# Patient Record
Sex: FEMALE | Race: BLACK OR AFRICAN AMERICAN | Employment: OTHER | URBAN - METROPOLITAN AREA
[De-identification: names, ages, dates, MRNs, and addresses within clinical notes are randomized per-mention and may not be internally consistent; named-entity substitution may affect disease eponyms.]

---

## 2017-01-12 ENCOUNTER — OFFICE VISIT (OUTPATIENT)
Dept: FAMILY MEDICINE CLINIC | Age: 62
End: 2017-01-12

## 2017-01-12 VITALS
RESPIRATION RATE: 20 BRPM | DIASTOLIC BLOOD PRESSURE: 87 MMHG | BODY MASS INDEX: 34.84 KG/M2 | TEMPERATURE: 97.9 F | OXYGEN SATURATION: 97 % | HEART RATE: 80 BPM | SYSTOLIC BLOOD PRESSURE: 122 MMHG | WEIGHT: 172.8 LBS | HEIGHT: 59 IN

## 2017-01-12 DIAGNOSIS — Z11.1 TUBERCULOSIS SCREENING: ICD-10-CM

## 2017-01-12 DIAGNOSIS — R05.9 COUGH: Primary | ICD-10-CM

## 2017-01-12 DIAGNOSIS — Z92.89 HISTORY OF POSITIVE PPD: ICD-10-CM

## 2017-01-12 RX ORDER — AZITHROMYCIN 250 MG/1
TABLET, FILM COATED ORAL
Qty: 6 TAB | Refills: 0 | Status: SHIPPED | OUTPATIENT
Start: 2017-01-12 | End: 2017-02-17 | Stop reason: ALTCHOICE

## 2017-01-12 RX ORDER — PROMETHAZINE HYDROCHLORIDE AND DEXTROMETHORPHAN HYDROBROMIDE 6.25; 15 MG/5ML; MG/5ML
5 SYRUP ORAL
Qty: 100 ML | Refills: 0 | Status: SHIPPED | OUTPATIENT
Start: 2017-01-12 | End: 2017-01-19

## 2017-01-12 NOTE — PROGRESS NOTES
Chief Complaint   Patient presents with    Cough     x 6 days           HPI:     A 56 y/o female patient presents with complaints of persistent cough x 6 days. States cough got progressively worse within the last few days which prompted visit today. States she tried OTC cough preparations with no relief. Denies nasal congestion, frontal headache, sore throat, fever, chills, generalized body aches. 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 Denies SOB, CP, dizziness. She is up to date with influenza vaccine. Also requests for CXR for tuberculosis screening for job purposes. States she always had positive PPD in the past but no history of TB. Denies low grade temp, night sweats, weakness, loss of appetite, weight loss. Past Medical History   Diagnosis Date    Arthritis     Diverticulitis      Social History   Substance Use Topics    Smoking status: Never Smoker    Smokeless tobacco: Never Used    Alcohol use No       Outpatient Encounter Prescriptions as of 1/12/2017     No facility-administered encounter medications on file as of 1/12/2017. ROS:    Negative except that mentioned in HPI.     Physical Exam:    Vital Signs:   Visit Vitals    /87 (BP 1 Location: Left arm, BP Patient Position: Sitting)    Pulse 80    Temp 97.9 °F (36.6 °C) (Oral)    Resp 20    Ht 4' 11\" (1.499 m)    Wt 172 lb 12.8 oz (78.4 kg)    SpO2 97%  Comment: room air    BMI 34.9 kg/m2         Constitutional: a, a & o x 3, mildly ill-appearing, no acute distress, interacting appropriately  HEENT: Head normocephalic, atraumatic, no conjuctival pallor or erythema, PERRLA, EOMI, nose clear, no sinus tenderness, pharynx and tonsils with mild erythema, no exudates, no tonsillar swelling   Respiratory: symmetrical chest expansion,diminished lung sound on JULIO, normal respiratory effort, no wheezes or crackles  CV: capillary refill < 2 sec, normal S1S2, regular rate and rhythm, no murmurs, no carotid bruits, no JVD, pulses palpable  Skin: no pallor, warm and dry, no rashes  Lymph: no cervical lymphadenopathy      Assessment/Plan:    1. Cough    - avoid triggers if possible  - push fluids  - azithromycin (ZITHROMAX) 250 mg tablet; Take 2 tablets today, then take 1 tablet daily  Dispense: 6 Tab; Refill: 0  - promethazine-dextromethorphan (PROMETHAZINE-DM) 6.25-15 mg/5 mL syrup; Take 5 mL by mouth four (4) times daily as needed for Cough for up to 7 days. Indications: COUGH  Dispense: 100 mL; Refill: 0    2. History of positive PPD    - XR CHEST PA LAT; Future    3. Tuberculosis screening    - XR CHEST PA LAT; Future    Additional Notes: Discussed today's diagnosis and treatment plans. Medication indications and adverse effects discussed. After Visit Summary: Provided and discussed printed patient instructions. Questions answered. Follow-up Disposition:  Return if symptoms worsen or fail to improve. Ankit Shoemaker, ANP-BC  Adult Medicine  [facility]

## 2017-01-12 NOTE — LETTER
NOTIFICATION RETURN TO WORK / SCHOOL 
 
1/12/2017 10:19 AM 
 
Ms. Ajay Ron [place] [address] To Whom It May Concern: 
 
Ajay Ron is currently under the care of Lane Anderson. She will return to work/school on:1/15/17 If there are questions or concerns please have the patient contact our office. Sincerely, NANCY Boland-C

## 2017-01-12 NOTE — PROGRESS NOTES
Pt here today for cough x 6 days    1. Have you been to the ER, urgent care clinic since your last visit? Hospitalized since your last visit? No    2. Have you seen or consulted any other health care providers outside of the [address] [place] [address] since your last visit? Include any pap smears or colon screening.  No

## 2017-01-12 NOTE — MR AVS SNAPSHOT
Visit Information Date & Time Provider Department Dept. Phone Encounter #  
 1/12/2017  9:30 AM Summer Anderson, [address] [id number] Follow-up Instructions Return if symptoms worsen or fail to improve. Your Appointments 2/16/2017  8:45 AM  
Follow Up with MD Lane Matthew 23 ([facility]) Appt Note: 6 mo f/u  
 Eloymomercedes Justin. [address]  
  
   
 [address] Upcoming Health Maintenance Date Due ZOSTER VACCINE AGE 60> 12/12/2014 BREAST CANCER SCRN MAMMOGRAM 8/9/2018 PAP AKA CERVICAL CYTOLOGY 9/14/2018 COLONOSCOPY 2/20/2022 DTaP/Tdap/Td series (2 - Td) 12/24/2024 Allergies as of 1/12/2017  Review Complete On: 1/12/2017 By: TRENT Steward Severity Noted Reaction Type Reactions Naprosyn [Naproxen]  10/02/2013    Itching [place]  09/17/2015    Itching, Swelling Per pt report Current Immunizations  Reviewed on 1/12/2017 Name Date Influenza Vaccine 12/24/2014, 10/2/2013 Influenza Vaccine (Quad) PF 11/1/2016, 9/14/2015 Influenza Vaccine Split 10/5/2012 Tdap 12/24/2014 Reviewed by TRENT Steward on 1/12/2017 at 10:06 AM  
You Were Diagnosed With   
  
 Codes Comments Cough    -  Primary ICD-10-CM: N69 ICD-9-CM: 786.2 History of positive PPD     ICD-10-CM: R76.11 
ICD-9-CM: 795.51 Tuberculosis screening     ICD-10-CM: Z11.1 ICD-9-CM: V74.1 Vitals BP Pulse Temp Resp Height(growth percentile) Weight(growth percentile) 122/87 (BP 1 Location: Left arm, BP Patient Position: Sitting) 80 97.9 °F (36.6 °C) (Oral) 20 4' 11\" (1.499 m) 172 lb 12.8 oz (78.4 kg) SpO2 BMI OB Status Smoking Status 97% 34.9 kg/m2 Postmenopausal Never Smoker BMI and BSA Data Body Mass Index Body Surface Area  34.9 kg/m 2 1.81 m 2  
  
 Preferred Pharmacy Pharmacy Name Phone Gloria Mckeon [address] [place] [address] [phone] Your Updated Medication List  
  
   
This list is accurate as of: 1/12/17 10:19 AM.  Always use your most recent med list.  
  
  
  
  
 azithromycin 250 mg tablet Commonly known as:  [place] Take 2 tablets today, then take 1 tablet daily  
  
 promethazine-dextromethorphan 6.25-15 mg/5 mL syrup Commonly known as:  PROMETHAZINE-DM Take 5 mL by mouth four (4) times daily as needed for Cough for up to 7 days. Indications: COUGH Prescriptions Sent to Pharmacy Refills  
 azithromycin (ZITHROMAX) 250 mg tablet 0 Sig: Take 2 tablets today, then take 1 tablet daily Class: Normal  
 Pharmacy: [facility] [address] [place] [address] Ph #: [phone] promethazine-dextromethorphan (PROMETHAZINE-DM) 6.25-15 mg/5 mL syrup 0 Sig: Take 5 mL by mouth four (4) times daily as needed for Cough for up to 7 days. Indications: COUGH Class: Normal  
 Pharmacy: [facility] [address] Ph #: [phone] Route: Oral  
  
Follow-up Instructions Return if symptoms worsen or fail to improve. To-Do List   
 01/12/2017 Imaging:  XR CHEST PA LAT Patient Instructions Cough: Care Instructions Your Care Instructions A cough is your body's response to something that bothers your throat or airways. Many things can cause a cough. You might cough because of a cold or the flu, bronchitis, or asthma. Smoking, postnasal drip, allergies, and stomach acid that backs up into your throat also can cause coughs. A cough is a symptom, not a disease.  Most coughs stop when the cause, such as a cold, goes away. You can take a few steps at home to cough less and feel better. Follow-up care is a key part of your treatment and safety. Be sure to make and go to all appointments, and call your doctor if you are having problems. It's also a good idea to know your test results and keep a list of the medicines you take. How can you care for yourself at home? · Drink lots of water and other fluids. This helps thin the mucus and soothes a dry or sore throat. Honey or lemon juice in hot water or tea may ease a dry cough. · Take cough medicine as directed by your doctor. · Prop up your head on pillows to help you breathe and ease a dry cough. · Try cough drops to soothe a dry or sore throat. Cough drops don't stop a cough. Medicine-flavored cough drops are no better than candy-flavored drops or hard candy. · Do not smoke. Avoid secondhand smoke. If you need help quitting, talk to your doctor about stop-smoking programs and medicines. These can increase your chances of quitting for good. When should you call for help? Call 911 anytime you think you may need emergency care. For example, call if: 
· You have severe trouble breathing. Call your doctor now or seek immediate medical care if: 
· You cough up blood. · You have new or worse trouble breathing. · You have a new or higher fever. · You have a new rash. Watch closely for changes in your health, and be sure to contact your doctor if: 
· You cough more deeply or more often, especially if you notice more mucus or a change in the color of your mucus. · You have new symptoms, such as a sore throat, an earache, or sinus pain. · You do not get better as expected. Where can you learn more? Go to http://ada-korina.info/. Enter D279 in the search box to learn more about \"Cough: Care Instructions. \" Current as of: May 27, 2016 Content Version: 11.1 © 6590-9392 [employer].  Care instructions adapted under license by [address] (which disclaims liability or warranty for this information). If you have questions about a medical condition or this instruction, always ask your healthcare professional. [address] any warranty or liability for your use of this information. Introducing [facility]! Dear Bryan Hampton: 
Thank you for requesting a [email or web address] account. Our records indicate that you already have an active [email or web address] account. You can access your account anytime at [email or web address] Did you know that you can access your hospital and ER discharge instructions at any time in [email or web address]? You can also review all of your test results from your hospital stay or ER visit. Additional Information If you have questions, please visit the Frequently Asked Questions section of the [email or web address] website at [email or web address] Remember, [email or web address] is NOT to be used for urgent needs. For medical emergencies, dial 911. Now available from your iPhone and Android! Please provide this summary of care documentation to your next provider. Your primary care clinician is listed as Kelly Blood. If you have any questions after today's visit, please call [phone].

## 2017-01-13 ENCOUNTER — TELEPHONE (OUTPATIENT)
Dept: FAMILY MEDICINE CLINIC | Age: 62
End: 2017-01-13

## 2017-02-17 ENCOUNTER — OFFICE VISIT (OUTPATIENT)
Dept: FAMILY MEDICINE CLINIC | Age: 62
End: 2017-02-17

## 2017-02-17 VITALS
RESPIRATION RATE: 16 BRPM | DIASTOLIC BLOOD PRESSURE: 59 MMHG | HEART RATE: 74 BPM | WEIGHT: 170 LBS | BODY MASS INDEX: 34.27 KG/M2 | HEIGHT: 59 IN | SYSTOLIC BLOOD PRESSURE: 128 MMHG | TEMPERATURE: 96.8 F

## 2017-02-17 DIAGNOSIS — J32.0 CHRONIC MAXILLARY SINUSITIS: Primary | ICD-10-CM

## 2017-02-17 DIAGNOSIS — R05.9 COUGH: ICD-10-CM

## 2017-02-17 RX ORDER — LEVOFLOXACIN 500 MG/1
500 TABLET, FILM COATED ORAL DAILY
Qty: 7 TAB | Refills: 0 | Status: SHIPPED | OUTPATIENT
Start: 2017-02-17 | End: 2017-02-24

## 2017-02-17 RX ORDER — HYDROCODONE POLISTIREX AND CHLORPHENIRAMINE POLISTIREX 10; 8 MG/5ML; MG/5ML
5 SUSPENSION, EXTENDED RELEASE ORAL
Qty: 120 ML | Refills: 0 | Status: SHIPPED | OUTPATIENT
Start: 2017-02-17 | End: 2017-06-20 | Stop reason: ALTCHOICE

## 2017-02-17 NOTE — PROGRESS NOTES
1. Have you been to the ER, urgent care clinic since your last visit? Hospitalized since your last visit? No    2. Have you seen or consulted any other health care providers outside of the [address] [place] [address] since your last visit? Include any pap smears or colon screening.  No

## 2017-02-17 NOTE — PATIENT INSTRUCTIONS
Chronic Sinusitis: Care Instructions  Your Care Instructions    Sinusitis is an infection of the lining of the sinus cavities in your head. It causes pain and pressure in your head and face. Sinusitis can be short-term (acute) or long-term (chronic). Chronic sinusitis lasts 12 weeks or longer. It is often caused by a bacterial or fungal infection. Other things, such as allergies, may also be involved. Chronic sinusitis may be hard to treat. It can lead to permanent changes in the mucous membranes that line the sinuses. It may make future sinus infections more likely. The infection may take some time to treat. Antibiotics are usually used if the infection is caused by bacteria. You may also need to use a corticosteroid nasal spray. If the infection is not cured after you try two or more different antibiotics, you may want to talk with your doctor about surgery or allergy testing. If the sinusitis is caused by a fungal infection, you may need to take antifungals or other medicines. You may also need surgery. Follow-up care is a key part of your treatment and safety. Be sure to make and go to all appointments, and call your doctor if you are having problems. It's also a good idea to know your test results and keep a list of the medicines you take. How can you care for yourself at home? Medicines  · Be safe with medicines. Take your medicines exactly as prescribed. Call your doctor if you think you are having a problem with your medicine. You will get more details on the specific medicines your doctor prescribes. · Take your antibiotics as directed. Do not stop taking them just because you feel better. You need to take the full course of antibiotics. · Your doctor may recommend a corticosteroid nasal spray, wash, drops, or pills. Take this medicine exactly as prescribed. At home  · Breathe warm, moist air. You can use a steamy shower, a hot bath, or a sink filled with hot water. Avoid cold, dry air.  Using a humidifier in your home may help. Follow the instructions for cleaning the machine. · Use saline (saltwater) nasal washes every day. This helps keep your nasal passages open. It also can wash out mucus and bacteria. ¨ You can buy saline nose drops at a grocery store or drugstore. ¨ You can make your own at home. Add 1 teaspoon of salt and 1 teaspoon of baking soda to 2 cups of distilled water. If you make your own, fill a bulb syringe with the solution. Then insert the tip into your nostril and squeeze gently. Robin Lien your nose. · Put a warm, wet towel or a warm gel pack on your face 3 or 4 times a day. Leave it on 5 to 10 minutes each time. · Do not smoke or breathe secondhand smoke. Smoking can make sinusitis worse. If you need help quitting, talk to your doctor about stop-smoking programs and medicines. These can increase your chances of quitting for good. When should you call for help? Call your doctor now or seek immediate medical care if:  · You have new or worse swelling or redness in face or around eyes. Watch closely for changes in your health, and be sure to contact your doctor if:  · You have a new or higher fever. · You have new or worse facial pain. · The mucus from your nose becomes thicker (like pus) or has new blood in it. · You do not get better as expected. Where can you learn more? Go to http://ada-korina.info/. Enter Q991 in the search box to learn more about \"Chronic Sinusitis: Care Instructions. \"  Current as of: July 29, 2016  Content Version: 11.1  © 0960-0157 [employer]. Care instructions adapted under license by [employer] (which disclaims liability or warranty for this information). If you have questions about a medical condition or this instruction, always ask your healthcare professional. [address] any warranty or liability for your use of this information.        Cough: Care Instructions  Your Care Instructions  A cough is your body's response to something that bothers your throat or airways. Many things can cause a cough. You might cough because of a cold or the flu, bronchitis, or asthma. Smoking, postnasal drip, allergies, and stomach acid that backs up into your throat also can cause coughs. A cough is a symptom, not a disease. Most coughs stop when the cause, such as a cold, goes away. You can take a few steps at home to cough less and feel better. Follow-up care is a key part of your treatment and safety. Be sure to make and go to all appointments, and call your doctor if you are having problems. It's also a good idea to know your test results and keep a list of the medicines you take. How can you care for yourself at home? · Drink lots of water and other fluids. This helps thin the mucus and soothes a dry or sore throat. Honey or lemon juice in hot water or tea may ease a dry cough. · Take cough medicine as directed by your doctor. · Prop up your head on pillows to help you breathe and ease a dry cough. · Try cough drops to soothe a dry or sore throat. Cough drops don't stop a cough. Medicine-flavored cough drops are no better than candy-flavored drops or hard candy. · Do not smoke. Avoid secondhand smoke. If you need help quitting, talk to your doctor about stop-smoking programs and medicines. These can increase your chances of quitting for good. When should you call for help? Call 911 anytime you think you may need emergency care. For example, call if:  · You have severe trouble breathing. Call your doctor now or seek immediate medical care if:  · You cough up blood. · You have new or worse trouble breathing. · You have a new or higher fever. · You have a new rash. Watch closely for changes in your health, and be sure to contact your doctor if:  · You cough more deeply or more often, especially if you notice more mucus or a change in the color of your mucus.   · You have new symptoms, such as a sore throat, an earache, or sinus pain. · You do not get better as expected. Where can you learn more? Go to http://ada-korina.info/. Enter D279 in the search box to learn more about \"Cough: Care Instructions. \"  Current as of: May 27, 2016  Content Version: 11.1  © 3741-5267 Going. Care instructions adapted under license by [employer] (which disclaims liability or warranty for this information). If you have questions about a medical condition or this instruction, always ask your healthcare professional. [place] [address] any warranty or liability for your use of this information.

## 2017-02-17 NOTE — MR AVS SNAPSHOT
Visit Information Date & Time Provider Department Dept. Phone Encounter #  
 2/17/2017  8:30 AM Krishna Bah NP [facility] [id number] Follow-up Instructions Return if symptoms worsen or fail to improve. Upcoming Health Maintenance Date Due ZOSTER VACCINE AGE 60> 12/12/2014 BREAST CANCER SCRN MAMMOGRAM 8/9/2018 PAP AKA CERVICAL CYTOLOGY 9/14/2018 COLONOSCOPY 2/20/2022 DTaP/Tdap/Td series (2 - Td) 12/24/2024 Allergies as of 2/17/2017  Review Complete On: 2/17/2017 By: Chato Arce LPN Severity Noted Reaction Type Reactions Naprosyn [Naproxen]  10/02/2013    Itching [place]  09/17/2015    Itching, Swelling Per pt report Current Immunizations  Reviewed on 1/12/2017 Name Date Influenza Vaccine 12/24/2014, 10/2/2013 Influenza Vaccine (Quad) PF 11/1/2016, 9/14/2015 Influenza Vaccine Split 10/5/2012 Tdap 12/24/2014 Not reviewed this visit You Were Diagnosed With   
  
 Codes Comments Chronic maxillary sinusitis    -  Primary ICD-10-CM: J32.0 ICD-9-CM: 473.0 Cough     ICD-10-CM: R05 ICD-9-CM: 018. 2 Vitals BP Pulse Temp Resp Height(growth percentile) Weight(growth percentile) 128/59 74 96.8 °F (36 °C) (Oral) 16 4' 11\" (1.499 m) 170 lb (77.1 kg) BMI OB Status Smoking Status 34.34 kg/m2 Postmenopausal Never Smoker Vitals History BMI and BSA Data Body Mass Index Body Surface Area  
 34.34 kg/m 2 1.79 m 2 Preferred Pharmacy Pharmacy Name Phone [facility] [address] [place] [address] [phone] Your Updated Medication List  
  
   
This list is accurate as of: 2/17/17  9:02 AM.  Always use your most recent med list.  
  
  
  
  
 chlorpheniramine-HYDROcodone 10-8 mg/5 mL suspension Commonly known as:  Maria Elena Shi Take 5 mL by mouth every twelve (12) hours as needed for Cough. Max Daily Amount: 10 mL. levoFLOXacin 500 mg tablet Commonly known as:  April Bagley Take 1 Tab by mouth daily for 7 days. Prescriptions Printed Refills  
 chlorpheniramine-HYDROcodone (TUSSIONEX) 10-8 mg/5 mL suspension 0 Sig: Take 5 mL by mouth every twelve (12) hours as needed for Cough. Max Daily Amount: 10 mL. Class: Print Route: Oral  
  
Prescriptions Sent to Pharmacy Refills  
 levoFLOXacin (LEVAQUIN) 500 mg tablet 0 Sig: Take 1 Tab by mouth daily for 7 days. Class: Normal  
 Pharmacy: [facility] [address], [address] [facility] Lela Cody [address] Sigrid Fisher Ph #: [phone] Route: Oral  
  
Follow-up Instructions Return if symptoms worsen or fail to improve. Patient Instructions Chronic Sinusitis: Care Instructions Your Care Instructions Sinusitis is an infection of the lining of the sinus cavities in your head. It causes pain and pressure in your head and face. Sinusitis can be short-term (acute) or long-term (chronic). Chronic sinusitis lasts 12 weeks or longer. It is often caused by a bacterial or fungal infection. Other things, such as allergies, may also be involved. Chronic sinusitis may be hard to treat. It can lead to permanent changes in the mucous membranes that line the sinuses. It may make future sinus infections more likely. The infection may take some time to treat. Antibiotics are usually used if the infection is caused by bacteria. You may also need to use a corticosteroid nasal spray. If the infection is not cured after you try two or more different antibiotics, you may want to talk with your doctor about surgery or allergy testing. If the sinusitis is caused by a fungal infection, you may need to take antifungals or other medicines. You may also need surgery. Follow-up care is a key part of your treatment and safety. Be sure to make and go to all appointments, and call your doctor if you are having problems. It's also a good idea to know your test results and keep a list of the medicines you take. How can you care for yourself at home? Medicines · Be safe with medicines. Take your medicines exactly as prescribed. Call your doctor if you think you are having a problem with your medicine. You will get more details on the specific medicines your doctor prescribes. · Take your antibiotics as directed. Do not stop taking them just because you feel better. You need to take the full course of antibiotics. · Your doctor may recommend a corticosteroid nasal spray, wash, drops, or pills. Take this medicine exactly as prescribed. At home · Breathe warm, moist air. You can use a steamy shower, a hot bath, or a sink filled with hot water. Avoid cold, dry air. Using a humidifier in your home may help. Follow the instructions for cleaning the machine. · Use saline (saltwater) nasal washes every day. This helps keep your nasal passages open. It also can wash out mucus and bacteria. ¨ You can buy saline nose drops at a grocery store or drugstore. ¨ You can make your own at home. Add 1 teaspoon of salt and 1 teaspoon of baking soda to 2 cups of distilled water. If you make your own, fill a bulb syringe with the solution. Then insert the tip into your nostril and squeeze gently. Maria Elena [place] your nose. · Put a warm, wet towel or a warm gel pack on your face 3 or 4 times a day. Leave it on 5 to 10 minutes each time. · Do not smoke or breathe secondhand smoke. Smoking can make sinusitis worse. If you need help quitting, talk to your doctor about stop-smoking programs and medicines. These can increase your chances of quitting for good. When should you call for help? Call your doctor now or seek immediate medical care if: · You have new or worse swelling or redness in face or around eyes. Watch closely for changes in your health, and be sure to contact your doctor if: 
· You have a new or higher fever. · You have new or worse facial pain. · The mucus from your nose becomes thicker (like pus) or has new blood in it. · You do not get better as expected. Where can you learn more? Go to http://ada-korina.info/. Enter W142 in the search box to learn more about \"Chronic Sinusitis: Care Instructions. \" Current as of: July 29, 2016 Content Version: 11.1 © 2876-9748 [employer]. Care instructions adapted under license by [employer] (which disclaims liability or warranty for this information). If you have questions about a medical condition or this instruction, always ask your healthcare professional. [place] [address] any warranty or liability for your use of this information. Cough: Care Instructions Your Care Instructions A cough is your body's response to something that bothers your throat or airways. Many things can cause a cough. You might cough because of a cold or the flu, bronchitis, or asthma. Smoking, postnasal drip, allergies, and stomach acid that backs up into your throat also can cause coughs. A cough is a symptom, not a disease. Most coughs stop when the cause, such as a cold, goes away. You can take a few steps at home to cough less and feel better. Follow-up care is a key part of your treatment and safety. Be sure to make and go to all appointments, and call your doctor if you are having problems. It's also a good idea to know your test results and keep a list of the medicines you take. How can you care for yourself at home? · Drink lots of water and other fluids. This helps thin the mucus and soothes a dry or sore throat. Honey or lemon juice in hot water or tea may ease a dry cough. · Take cough medicine as directed by your doctor. · Prop up your head on pillows to help you breathe and ease a dry cough. · Try cough drops to soothe a dry or sore throat. Cough drops don't stop a cough. Medicine-flavored cough drops are no better than candy-flavored drops or hard candy. · Do not smoke. Avoid secondhand smoke. If you need help quitting, talk to your doctor about stop-smoking programs and medicines. These can increase your chances of quitting for good. When should you call for help? Call 911 anytime you think you may need emergency care. For example, call if: 
· You have severe trouble breathing. Call your doctor now or seek immediate medical care if: 
· You cough up blood. · You have new or worse trouble breathing. · You have a new or higher fever. · You have a new rash. Watch closely for changes in your health, and be sure to contact your doctor if: 
· You cough more deeply or more often, especially if you notice more mucus or a change in the color of your mucus. · You have new symptoms, such as a sore throat, an earache, or sinus pain. · You do not get better as expected. Where can you learn more? Go to http://ada-korina.info/. Enter D279 in the search box to learn more about \"Cough: Care Instructions. \" Current as of: May 27, 2016 Content Version: 11.1 © 0490-9699 [employer]. Care instructions adapted under license by [employer] (which disclaims liability or warranty for this information). If you have questions about a medical condition or this instruction, always ask your healthcare professional. [place] [address] any warranty or liability for your use of this information. Introducing [facility]! Dear Neelam Rascon: 
Thank you for requesting a [employer] account. Our records indicate that you already have an active [employer] account. You can access your account anytime at [email or web address] Did you know that you can access your hospital and ER discharge instructions at any time in Mipagar? You can also review all of your test results from your hospital stay or ER visit. Additional Information If you have questions, please visit the Frequently Asked Questions section of the [employer] website at [email or web address] Remember, Mipagar is NOT to be used for urgent needs. For medical emergencies, dial 911. Now available from your iPhone and Android! Please provide this summary of care documentation to your next provider. Your primary care clinician is listed as Sha Cheema. If you have any questions after today's visit, please call [phone].

## 2017-02-17 NOTE — PROGRESS NOTES
Chief Complaint   Patient presents with    Cough     recurrent    Sinus Pain       HPI:    This is a 56 y/o female  patient who presents for the above symptoms. Symptoms started with cough about 3 week and was treated with Zithromax and cough medication. .  Confirm she had some relief but have started to cough with sinus tenderness, green nasal discharge. She complain of headache especially with bending. No fever or SOB. CXR done 1/12/17 was negative for acute cardiopulmonary process. ROS: pertinent positives as noted in HPI. All others were negative. Past Medical History   Diagnosis Date    Arthritis     Diverticulitis      Social History     Social History    Marital status: SINGLE     Spouse name: N/A    Number of children: N/A    Years of education: N/A     Occupational History    Not on file. Social History Main Topics    Smoking status: Never Smoker    Smokeless tobacco: Never Used    Alcohol use No    Drug use: No    Sexual activity: Not Currently     Other Topics Concern    Not on file     Social History Narrative     Current Outpatient Prescriptions   Medication Sig Dispense Refill    chlorpheniramine-HYDROcodone (TUSSIONEX) 10-8 mg/5 mL suspension Take 5 mL by mouth every twelve (12) hours as needed for Cough. Max Daily Amount: 10 mL. 120 mL 0    levoFLOXacin (LEVAQUIN) 500 mg tablet Take 1 Tab by mouth daily for 7 days. 7 Tab 0     Allergies   Allergen Reactions    Naprosyn [Naproxen] Itching    Strawberry Itching and Swelling     Per pt report       Physical Exam:    Vital Signs:   Visit Vitals    /59    Pulse 74    Temp 96.8 °F (36 °C) (Oral)    Resp 16    Ht 4' 11\" (1.499 m)    Wt 170 lb (77.1 kg)    BMI 34.34 kg/m2         General: a, a & o x 3, afebrile, interacting appropriately, in no acute distress  HEENT: head normocephalic and atraumatic, conjuctiva clear. + sinus tenderness.   Respiratory: lung sounds clear bilaterally, good respiratory effort, no wheezes or crackles  Cardiovascular: normal S1S2, regular rate and rhythm, no murmurs      Assessment/Plan:      ICD-10-CM ICD-9-CM    1. Chronic maxillary sinusitis J32.0 473.0 levoFLOXacin (LEVAQUIN) 500 mg tablet   2. Cough R05 786.2 chlorpheniramine-HYDROcodone (TUSSIONEX) 10-8 mg/5 mL suspension         Additional Notes: Discussed today's diagnosis, treatment plans. Discussed medication indications and side effects. After Visit Summary: Provided and discussed printed patient instructions. Answered questions in relation to today's diagnosis.   Follow-up Disposition: f/u as needed if symptoms fail to improve or worsen            James Negro NP-BC  Family Medicine  [facility]            Orders Placed This Encounter    chlorpheniramine-HYDROcodone (TUSSIONEX) 10-8 mg/5 mL suspension    levoFLOXacin (LEVAQUIN) 500 mg tablet

## 2017-06-20 ENCOUNTER — OFFICE VISIT (OUTPATIENT)
Dept: FAMILY MEDICINE CLINIC | Age: 62
End: 2017-06-20

## 2017-06-20 VITALS
TEMPERATURE: 97 F | BODY MASS INDEX: 35.08 KG/M2 | RESPIRATION RATE: 18 BRPM | SYSTOLIC BLOOD PRESSURE: 109 MMHG | HEART RATE: 87 BPM | HEIGHT: 59 IN | WEIGHT: 174 LBS | OXYGEN SATURATION: 96 % | DIASTOLIC BLOOD PRESSURE: 74 MMHG

## 2017-06-20 DIAGNOSIS — Z12.39 SCREENING FOR BREAST CANCER: ICD-10-CM

## 2017-06-20 DIAGNOSIS — Z00.00 WELL WOMAN EXAM (NO GYNECOLOGICAL EXAM): Primary | ICD-10-CM

## 2017-06-20 DIAGNOSIS — E55.9 VITAMIN D DEFICIENCY: ICD-10-CM

## 2017-06-20 DIAGNOSIS — M81.0 OSTEOPOROSIS WITHOUT CURRENT PATHOLOGICAL FRACTURE, UNSPECIFIED OSTEOPOROSIS TYPE: ICD-10-CM

## 2017-06-20 NOTE — PATIENT INSTRUCTIONS
Well Visit, Women 48 to 72: Care Instructions  Your Care Instructions  Physical exams can help you stay healthy. Your doctor has checked your overall health and may have suggested ways to take good care of yourself. He or she also may have recommended tests. At home, you can help prevent illness with healthy eating, regular exercise, and other steps. Follow-up care is a key part of your treatment and safety. Be sure to make and go to all appointments, and call your doctor if you are having problems. It's also a good idea to know your test results and keep a list of the medicines you take. How can you care for yourself at home? · Reach and stay at a healthy weight. This will lower your risk for many problems, such as obesity, diabetes, heart disease, and high blood pressure. · Get at least 30 minutes of exercise on most days of the week. Walking is a good choice. You also may want to do other activities, such as running, swimming, cycling, or playing tennis or team sports. · Do not smoke. Smoking can make health problems worse. If you need help quitting, talk to your doctor about stop-smoking programs and medicines. These can increase your chances of quitting for good. · Protect your skin from too much sun. When you're outdoors from 10 a.m. to 4 p.m., stay in the shade or cover up with clothing and a hat with a wide brim. Wear sunglasses that block UV rays. Even when it's cloudy, put broad-spectrum sunscreen (SPF 30 or higher) on any exposed skin. · See a dentist one or two times a year for checkups and to have your teeth cleaned. · Wear a seat belt in the car. · Limit alcohol to 1 drink a day. Too much alcohol can cause health problems. Follow your doctor's advice about when to have certain tests. These tests can spot problems early. · Cholesterol.  Your doctor will tell you how often to have this done based on your age, family history, or other things that can increase your risk for heart attack and stroke. · Blood pressure. Have your blood pressure checked during a routine doctor visit. Your doctor will tell you how often to check your blood pressure based on your age, your blood pressure results, and other factors. · Mammogram. Ask your doctor how often you should have a mammogram, which is an X-ray of your breasts. A mammogram can spot breast cancer before it can be felt and when it is easiest to treat. · Pap test and pelvic exam. Ask your doctor how often you should have a Pap test. You may not need to have a Pap test as often as you used to. · Vision. Have your eyes checked every year or two or as often as your doctor suggests. Some experts recommend that you have yearly exams for glaucoma and other age-related eye problems starting at age 48. · Hearing. Tell your doctor if you notice any change in your hearing. You can have tests to find out how well you hear. · Diabetes. Ask your doctor whether you should have tests for diabetes. · Colon cancer. You should begin tests for colon cancer at age 48. You may have one of several tests. Your doctor will tell you how often to have tests based on your age and risk. Risks include whether you already had a precancerous polyp removed from your colon or whether your parents, sisters and brothers, or children have had colon cancer. · Thyroid disease. Talk to your doctor about whether to have your thyroid checked as part of a regular physical exam. Women have an increased chance of a thyroid problem. · Osteoporosis. You should begin tests for bone density at age 72. If you are younger than 72, ask your doctor whether you have factors that may increase your risk for this disease. You may want to have this test before age 72. · Heart attack and stroke risk. At least every 4 to 6 years, you should have your risk for heart attack and stroke assessed.  Your doctor uses factors such as your age, blood pressure, cholesterol, and whether you smoke or have diabetes to show what your risk for a heart attack or stroke is over the next 10 years. When should you call for help? Watch closely for changes in your health, and be sure to contact your doctor if you have any problems or symptoms that concern you. Where can you learn more? Go to http://ada-korina.info/. Enter T646 in the search box to learn more about \"Well Visit, Women 50 to 72: Care Instructions. \"  Current as of: July 19, 2016  Content Version: 11.3  © 0620-7898 [employer]. Care instructions adapted under license by [employer] (which disclaims liability or warranty for this information). If you have questions about a medical condition or this instruction, always ask your healthcare professional. [address] any warranty or liability for your use of this information.

## 2017-06-20 NOTE — PROGRESS NOTES
1. Have you been to the ER, urgent care clinic since your last visit? Hospitalized since your last visit? No    2. Have you seen or consulted any other health care providers outside of the [address] [place] [address] since your last visit? Include any pap smears or colon screening.  No       Pt here for wellness check -up     Pt due for zoster vaccine

## 2017-06-20 NOTE — MR AVS SNAPSHOT
Visit Information Date & Time Provider Department Dept. Phone Encounter #  
 6/20/2017 12:30 PM Andreina Carreon MD [facility] [id number] Follow-up Instructions Return in about 1 year (around 6/20/2018) for physical.  
  
Upcoming Health Maintenance Date Due ZOSTER VACCINE AGE 60> 12/12/2014 INFLUENZA AGE 9 TO ADULT 8/1/2017 BREAST CANCER SCRN MAMMOGRAM 8/9/2018 PAP AKA CERVICAL CYTOLOGY 9/14/2018 COLONOSCOPY 2/20/2022 DTaP/Tdap/Td series (2 - Td) 12/24/2024 Allergies as of 6/20/2017  Review Complete On: 6/20/2017 By: Andreina Carreon MD  
  
 Severity Noted Reaction Type Reactions Naprosyn [Naproxen]  10/02/2013    Itching [place]  09/17/2015    Itching, Swelling Per pt report Current Immunizations  Reviewed on 1/12/2017 Name Date Influenza Vaccine 12/24/2014, 10/2/2013 Influenza Vaccine (Quad) PF 11/1/2016, 9/14/2015 Influenza Vaccine Split 10/5/2012 Tdap 12/24/2014 Not reviewed this visit You Were Diagnosed With   
  
 Codes Comments Well woman exam (no gynecological exam)    -  Primary ICD-10-CM: Z00.00 ICD-9-CM: V70.0 Vitamin D deficiency     ICD-10-CM: E55.9 ICD-9-CM: 268.9 Osteoporosis without current pathological fracture, unspecified osteoporosis type     ICD-10-CM: M81.0 ICD-9-CM: 733.00 Screening for breast cancer     ICD-10-CM: Z12.39 
ICD-9-CM: V76.10 Vitals BP Pulse Temp Resp Height(growth percentile) Weight(growth percentile) 109/74 (BP 1 Location: Left arm, BP Patient Position: Sitting) 87 97 °F (36.1 °C) (Oral) 18 4' 11\" (1.499 m) 174 lb (78.9 kg) SpO2 BMI OB Status Smoking Status 96% 35.14 kg/m2 Postmenopausal Never Smoker BMI and BSA Data Body Mass Index Body Surface Area  
 35.14 kg/m 2 1.81 m 2 Preferred Pharmacy Pharmacy Name Phone  [address] Fisher [phone] Your Updated Medication List  
  
Notice  As of 6/20/2017  1:04 PM  
 You have not been prescribed any medications. Follow-up Instructions Return in about 1 year (around 6/20/2018) for physical.  
  
To-Do List   
 06/20/2017 Lab:  CBC WITH AUTOMATED DIFF   
  
 06/20/2017 Lab:  LIPID PANEL   
  
 06/20/2017 Lab:  METABOLIC PANEL, COMPREHENSIVE   
  
 06/20/2017 Lab:  VITAMIN D, 25 HYDROXY   
  
 07/20/2017 Imaging:  DEXA BONE DENSITY STUDY AXIAL   
  
 08/19/2017 Imaging:  LENHCO MAMMO BI SCREENING INCL CAD Patient Instructions Well Visit, Women 48 to 72: Care Instructions Your Care Instructions Physical exams can help you stay healthy. Your doctor has checked your overall health and may have suggested ways to take good care of yourself. He or she also may have recommended tests. At home, you can help prevent illness with healthy eating, regular exercise, and other steps. Follow-up care is a key part of your treatment and safety. Be sure to make and go to all appointments, and call your doctor if you are having problems. It's also a good idea to know your test results and keep a list of the medicines you take. How can you care for yourself at home? · Reach and stay at a healthy weight. This will lower your risk for many problems, such as obesity, diabetes, heart disease, and high blood pressure. · Get at least 30 minutes of exercise on most days of the week. Walking is a good choice. You also may want to do other activities, such as running, swimming, cycling, or playing tennis or team sports. · Do not smoke. Smoking can make health problems worse. If you need help quitting, talk to your doctor about stop-smoking programs and medicines. These can increase your chances of quitting for good. · Protect your skin from too much sun. When you're outdoors from 10 a.m. to 4 p.m., stay in the shade or cover up with clothing and a hat with a wide brim. Wear sunglasses that block UV rays. Even when it's cloudy, put broad-spectrum sunscreen (SPF 30 or higher) on any exposed skin. · See a dentist one or two times a year for checkups and to have your teeth cleaned. · Wear a seat belt in the car. · Limit alcohol to 1 drink a day. Too much alcohol can cause health problems. Follow your doctor's advice about when to have certain tests. These tests can spot problems early. · Cholesterol. Your doctor will tell you how often to have this done based on your age, family history, or other things that can increase your risk for heart attack and stroke. · Blood pressure. Have your blood pressure checked during a routine doctor visit. Your doctor will tell you how often to check your blood pressure based on your age, your blood pressure results, and other factors. · Mammogram. Ask your doctor how often you should have a mammogram, which is an X-ray of your breasts. A mammogram can spot breast cancer before it can be felt and when it is easiest to treat. · Pap test and pelvic exam. Ask your doctor how often you should have a Pap test. You may not need to have a Pap test as often as you used to. · Vision. Have your eyes checked every year or two or as often as your doctor suggests. Some experts recommend that you have yearly exams for glaucoma and other age-related eye problems starting at age 48. · Hearing. Tell your doctor if you notice any change in your hearing. You can have tests to find out how well you hear. · Diabetes. Ask your doctor whether you should have tests for diabetes. · Colon cancer. You should begin tests for colon cancer at age 48. You may have one of several tests. Your doctor will tell you how often to have tests based on your age and risk.  Risks include whether you already had a precancerous polyp removed from your colon or whether your parents, sisters and brothers, or children have had colon cancer. · Thyroid disease. Talk to your doctor about whether to have your thyroid checked as part of a regular physical exam. Women have an increased chance of a thyroid problem. · Osteoporosis. You should begin tests for bone density at age 72. If you are younger than 72, ask your doctor whether you have factors that may increase your risk for this disease. You may want to have this test before age 72. · Heart attack and stroke risk. At least every 4 to 6 years, you should have your risk for heart attack and stroke assessed. Your doctor uses factors such as your age, blood pressure, cholesterol, and whether you smoke or have diabetes to show what your risk for a heart attack or stroke is over the next 10 years. When should you call for help? Watch closely for changes in your health, and be sure to contact your doctor if you have any problems or symptoms that concern you. Where can you learn more? Go to http://ada-korina.info/. Enter Q511 in the search box to learn more about \"Well Visit, Women 50 to 72: Care Instructions. \" Current as of: July 19, 2016 Content Version: 11.3 © 7995-9348 [employer]. Care instructions adapted under license by [employer] (which disclaims liability or warranty for this information). If you have questions about a medical condition or this instruction, always ask your healthcare professional. [address] any warranty or liability for your use of this information. Introducing [facility] & HEALTH SERVICES! Dear Asia Stephens: 
Thank you for requesting a [employer] account. Our records indicate that you already have an active [employer] account. You can access your account anytime at [email or web address] Did you know that you can access your hospital and ER discharge instructions at any time in [employer]?   You can also review all of your test results from your hospital stay or ER visit. Additional Information If you have questions, please visit the Frequently Asked Questions section of the [employer] website at [email or web address] Remember, [employer] is NOT to be used for urgent needs. For medical emergencies, dial 911. Now available from your iPhone and Android! Please provide this summary of care documentation to your next provider. Your primary care clinician is listed as Aruna Osuna. If you have any questions after today's visit, please call [phone].

## 2017-06-20 NOTE — PROGRESS NOTES
Chief Complaint   Patient presents with    Annual Wellness Visit       Pt is a 57y.o. year old female who presents for her annual wellness visit    Health Maintenance Due   Topic Date Due    ZOSTER VACCINE AGE 60>  12/12/2014-will go to her pharmacy     [address]  Result Impression   Impression:    1.  BMD MEASURES IN THE RANGE OF OSTEOPOROSIS AND INCREASED FRACTURE RISK. Did not take med-scared of side effects because she feels fine  Taking OTC Algae (natural) which she thinks helps-wants Dexa repeated    MAMMO-DIGITAL SCREENING BILAT W/ [address]  Result Impression   IMPRESSION:    No evidence for malignancy. Suggest routine follow-up. PAP done 9/2015-normal  No sxs, not sexually active    Last colonoscopy-2012, repeat in 10 yrs/hyperplastic polyp    Fasting for labs today  Strong family hx of DM    Wt Readings from Last 3 Encounters:   06/20/17 174 lb (78.9 kg)   02/17/17 170 lb (77.1 kg)   01/12/17 172 lb 12.8 oz (78.4 kg)     ROS:    Pt denies: Wt loss, Fever/Chills, HA, Visual changes, Fatigue, Chest pain, SOB, [place], Abd pain, N/V/D/C, Blood in stool or urine, Edema. Pertinent positive as above in HPI.  All others were negative    Patient Active Problem List   Diagnosis Code    Bursitis of shoulder, left M75.52    Right ankle sprain S93.401A    Hyperlipidemia E78.5    Subcutaneous mass-lt/ neck R22.9    Keratinous cyst-left neck L72.0    Osteoporosis M81.0    Primary osteoarthritis of left hip M16.12    Family history of CVA Z82.3       Past Medical History:   Diagnosis Date    Arthritis     Diverticulitis            History   Smoking Status    Never Smoker   Smokeless Tobacco    Never Used       Allergies   Allergen Reactions    Naprosyn [Naproxen] Itching    Strawberry Itching and Swelling     Per pt report       Patient Labs were reviewed: yes      Patient Past Records were reviewed:  yes        Objective: Vitals:    06/20/17 1234   BP: 109/74   Pulse: 87   Resp: 18   Temp: 97 °F (36.1 °C)   TempSrc: Oral   SpO2: 96%   Weight: 174 lb (78.9 kg)   Height: 4' 11\" (1.499 m)     Body mass index is 35.14 kg/(m^2). Exam:   Appearance: alert, well appearing,  oriented to person, place, and time, acyanotic, in no respiratory distress and well hydrated. HEENT:  NC/AT, pink conj, anicteric sclerae  Neck:  No cervical lymphadenopathy, no JVD, no thyromegaly, no carotid bruit  Heart:  RRR without M/R/G  Lungs:  CTAB, no rhonchi, rales, or wheezes with good air exchange   Abdomen:  Non-tender, pos bowel sounds, no hepatosplenomegaly  Ext:  No C/C/E    Skin: no rash  Neuro: no lateralizing signs, CNs II-XII intact  Breast exam: no palpable masses bilaterally, no nipple discharge, no axillary adenopathy, no skin changes    Assessment/ Plan:   Jason Moreland was seen today for annual wellness visit. Diagnoses and all orders for this visit:    Well woman exam (no gynecological exam)-Advised re: monthly self breast exam, dental prophylaxis Q 6 months, regular exercise, yearly eye exam, daily intake of Ca+D  -     CBC WITH AUTOMATED DIFF; Future  -     METABOLIC PANEL, COMPREHENSIVE; Future  -     LIPID PANEL; Future  -     [facility] MAMMO BI SCREENING INCL CAD; Future  -     DEXA BONE DENSITY STUDY AXIAL; Future      Vitamin D deficiency-on OTC Vit D  -     VITAMIN D, 25 HYDROXY; Future      Osteoporosis without current pathological fracture, unspecified osteoporosis type-declines tx, continue with Ca+d daily, walk for exercise  -     DEXA BONE DENSITY STUDY AXIAL; Future    Screening for breast cancer  -     [facility] MAMMO BI SCREENING INCL CAD; Future      Follow-up Disposition:  Return in about 1 year (around 6/20/2018) for physical.        I have discussed the diagnosis with the patient and the intended plan as seen in the above orders. The patient has received an After-Visit Summary and questions were answered concerning future plans. Medication Side Effects and Warnings were discussed with patient: yes    Patient verbalized understanding of above instructions.     Leslie Daigle MD  Internal Medicine  [facility]

## 2017-06-21 LAB
25(OH)D3+25(OH)D2 SERPL-MCNC: 42 NG/ML (ref 30–100)
ALBUMIN SERPL-MCNC: 4.3 G/DL (ref 3.6–4.8)
ALBUMIN/GLOB SERPL: 1.6 {RATIO} (ref 1.2–2.2)
ALP SERPL-CCNC: 75 IU/L (ref 39–117)
ALT SERPL-CCNC: 13 IU/L (ref 0–32)
AST SERPL-CCNC: 14 IU/L (ref 0–40)
BASOPHILS # BLD AUTO: 0 X10E3/UL (ref 0–0.2)
BASOPHILS NFR BLD AUTO: 0 %
BILIRUB SERPL-MCNC: 0.2 MG/DL (ref 0–1.2)
BUN SERPL-MCNC: 13 MG/DL (ref 8–27)
BUN/CREAT SERPL: 16 (ref 12–28)
CALCIUM SERPL-MCNC: 9.1 MG/DL (ref 8.7–10.3)
CHLORIDE SERPL-SCNC: 103 MMOL/L (ref 96–106)
CHOLEST SERPL-MCNC: 199 MG/DL (ref 100–199)
CO2 SERPL-SCNC: 23 MMOL/L (ref 18–29)
CREAT SERPL-MCNC: 0.81 MG/DL (ref 0.57–1)
EOSINOPHIL # BLD AUTO: 0.2 X10E3/UL (ref 0–0.4)
EOSINOPHIL NFR BLD AUTO: 4 %
ERYTHROCYTE [DISTWIDTH] IN BLOOD BY AUTOMATED COUNT: 15.5 % (ref 12.3–15.4)
GLOBULIN SER CALC-MCNC: 2.7 G/DL (ref 1.5–4.5)
GLUCOSE SERPL-MCNC: 83 MG/DL (ref 65–99)
HCT VFR BLD AUTO: 41.7 % (ref 34–46.6)
HDLC SERPL-MCNC: 71 MG/DL
HGB BLD-MCNC: 13.3 G/DL (ref 11.1–15.9)
IMM GRANULOCYTES # BLD: 0 X10E3/UL (ref 0–0.1)
IMM GRANULOCYTES NFR BLD: 0 %
INTERPRETATION, 910389: NORMAL
LDLC SERPL CALC-MCNC: 117 MG/DL (ref 0–99)
LYMPHOCYTES # BLD AUTO: 2 X10E3/UL (ref 0.7–3.1)
LYMPHOCYTES NFR BLD AUTO: 46 %
MCH RBC QN AUTO: 25.6 PG (ref 26.6–33)
MCHC RBC AUTO-ENTMCNC: 31.9 G/DL (ref 31.5–35.7)
MCV RBC AUTO: 80 FL (ref 79–97)
MONOCYTES # BLD AUTO: 0.3 X10E3/UL (ref 0.1–0.9)
MONOCYTES NFR BLD AUTO: 6 %
NEUTROPHILS # BLD AUTO: 2 X10E3/UL (ref 1.4–7)
NEUTROPHILS NFR BLD AUTO: 44 %
PLATELET # BLD AUTO: 180 X10E3/UL (ref 150–379)
POTASSIUM SERPL-SCNC: 4.3 MMOL/L (ref 3.5–5.2)
PROT SERPL-MCNC: 7 G/DL (ref 6–8.5)
RBC # BLD AUTO: 5.19 X10E6/UL (ref 3.77–5.28)
SODIUM SERPL-SCNC: 145 MMOL/L (ref 134–144)
TRIGL SERPL-MCNC: 55 MG/DL (ref 0–149)
VLDLC SERPL CALC-MCNC: 11 MG/DL (ref 5–40)
WBC # BLD AUTO: 4.4 X10E3/UL (ref 3.4–10.8)

## 2017-06-27 NOTE — PROGRESS NOTES
pls let patient know all her labs came back normal including Vit D; bad cholesterol slightly high so watch diet-recheck in 6 months

## 2017-07-19 DIAGNOSIS — M81.0 OSTEOPOROSIS WITHOUT CURRENT PATHOLOGICAL FRACTURE, UNSPECIFIED OSTEOPOROSIS TYPE: ICD-10-CM

## 2017-07-19 DIAGNOSIS — Z00.00 WELL WOMAN EXAM (NO GYNECOLOGICAL EXAM): ICD-10-CM

## 2017-07-21 DIAGNOSIS — Z12.39 SCREENING FOR BREAST CANCER: ICD-10-CM

## 2017-07-21 DIAGNOSIS — Z00.00 WELL WOMAN EXAM (NO GYNECOLOGICAL EXAM): ICD-10-CM

## 2017-10-26 ENCOUNTER — OFFICE VISIT (OUTPATIENT)
Dept: FAMILY MEDICINE CLINIC | Age: 62
End: 2017-10-26

## 2017-10-26 VITALS
TEMPERATURE: 97.9 F | HEIGHT: 59 IN | RESPIRATION RATE: 18 BRPM | BODY MASS INDEX: 33.87 KG/M2 | WEIGHT: 168 LBS | OXYGEN SATURATION: 97 % | DIASTOLIC BLOOD PRESSURE: 69 MMHG | SYSTOLIC BLOOD PRESSURE: 105 MMHG | HEART RATE: 76 BPM

## 2017-10-26 DIAGNOSIS — Z23 ENCOUNTER FOR IMMUNIZATION: ICD-10-CM

## 2017-10-26 DIAGNOSIS — E66.9 OBESITY (BMI 30.0-34.9): ICD-10-CM

## 2017-10-26 DIAGNOSIS — R55 SYNCOPE, UNSPECIFIED SYNCOPE TYPE: Primary | ICD-10-CM

## 2017-10-26 NOTE — LETTER
10/26/2017 11:53 AM 
 
Ms. Chani Ritchie [address] [place] [phone] To Whom It May Concern: Our patient Rich Campos did have her influenza vaccine at her office visit today 10/26/2017. If you have any questions please do not hesitate to contact our office at [phone]. Sincerely, Jose Flores MD

## 2017-10-26 NOTE — MR AVS SNAPSHOT
Visit Information Date & Time Provider Department Dept. Phone Encounter #  
 10/26/2017 11:00 AM Maximo Tafoya MD [facility] [id number] Follow-up Instructions Return in about 3 months (around 1/26/2018) for follow up. Upcoming Health Maintenance Date Due ZOSTER VACCINE AGE 60> 10/12/2014 INFLUENZA AGE 9 TO ADULT 8/1/2017 PAP AKA CERVICAL CYTOLOGY 9/14/2018 BREAST CANCER SCRN MAMMOGRAM 6/29/2019 COLONOSCOPY 2/20/2022 DTaP/Tdap/Td series (2 - Td) 12/24/2024 Allergies as of 10/26/2017  Review Complete On: 10/26/2017 By: Maximo Tafoya MD  
  
 Severity Noted Reaction Type Reactions Naprosyn [Naproxen]  10/02/2013    Itching [place]  09/17/2015    Itching, Swelling Per pt report Current Immunizations  Reviewed on 1/12/2017 Name Date Influenza Vaccine 12/24/2014, 10/2/2013 Influenza Vaccine (Quad) PF  Incomplete, 11/1/2016, 9/14/2015 Influenza Vaccine Split 10/5/2012 Tdap 12/24/2014 Not reviewed this visit You Were Diagnosed With   
  
 Codes Comments Syncope, unspecified syncope type    -  Primary ICD-10-CM: R55 
ICD-9-CM: 780.2 Obesity (BMI 30.0-34.9)     ICD-10-CM: E87.9 ICD-9-CM: 278.00 Encounter for immunization     ICD-10-CM: Q02 ICD-9-CM: V03.89 Vitals BP Pulse Temp Resp Height(growth percentile) Weight(growth percentile) 105/69 76 97.9 °F (36.6 °C) (Oral) 18 4' 11\" (1.499 m) 168 lb (76.2 kg) SpO2 BMI OB Status Smoking Status 97% 33.93 kg/m2 Postmenopausal Never Smoker BMI and BSA Data Body Mass Index Body Surface Area  
 33.93 kg/m 2 1.78 m 2 Preferred Pharmacy Pharmacy Name Phone [facility] [phone] [phone] Your Updated Medication List  
  
Notice  As of 10/26/2017 11:45 AM  
 You have not been prescribed any medications. We Performed the Following INFLUENZA VIRUS VAC QUAD,SPLIT,PRESV FREE SYRINGE IM [id number] CPT(R)] Follow-up Instructions Return in about 3 months (around 1/26/2018) for follow up. Patient Instructions Influenza (Flu) Vaccine (Inactivated or Recombinant): What You Need to Know Why get vaccinated? Influenza (\"flu\") is a contagious disease that spreads around the [place] every winter, usually between October and May. Flu is caused by influenza viruses and is spread mainly by coughing, sneezing, and close contact. Anyone can get flu. Flu strikes suddenly and can last several days. Symptoms vary by age, but can include: · Fever/chills. · Sore throat. · Muscle aches. · Fatigue. · Cough. · Headache. · Runny or stuffy nose. Flu can also lead to pneumonia and blood infections, and cause diarrhea and seizures in children. If you have a medical condition, such as heart or lung disease, flu can make it worse. Flu is more dangerous for some people. Infants and young children, people 72years of age and older, pregnant women, and people with certain health conditions or a weakened immune system are at greatest risk. Each year thousands of people in the [facility] die from flu, and many more are hospitalized. Flu vaccine can: · Keep you from getting flu. · Make flu less severe if you do get it. · Keep you from spreading flu to your family and other people. Inactivated and recombinant flu vaccines A dose of flu vaccine is recommended every flu season. Children 6 months through 6years of age may need two doses during the same flu season. Everyone else needs only one dose each flu season. Some inactivated flu vaccines contain a very small amount of a mercury-based preservative called thimerosal. Studies have not shown thimerosal in vaccines to be harmful, but flu vaccines that do not contain thimerosal are available. There is no live flu virus in flu shots. They cannot cause the flu. There are many flu viruses, and they are always changing. Each year a new flu vaccine is made to protect against three or four viruses that are likely to cause disease in the upcoming flu season. But even when the vaccine doesn't exactly match these viruses, it may still provide some protection. Flu vaccine cannot prevent: · Flu that is caused by a virus not covered by the vaccine. · Illnesses that look like flu but are not. Some people should not get this vaccine Tell the person who is giving you the vaccine: · If you have any severe (life-threatening) allergies. If you ever had a life-threatening allergic reaction after a dose of flu vaccine, or have a severe allergy to any part of this vaccine, you may be advised not to get vaccinated. Most, but not all, types of flu vaccine contain a small amount of egg protein. · If you ever had Guillain-Barré syndrome (also called GBS) Some people with a history of GBS should not get this vaccine. This should be discussed with your doctor. · If you are not feeling well. It is usually okay to get flu vaccine when you have a mild illness, but you might be asked to come back when you feel better. Risks of a vaccine reaction With any medicine, including vaccines, there is a chance of reactions. These are usually mild and go away on their own, but serious reactions are also possible. Most people who get a flu shot do not have any problems with it. Minor problems following a flu shot include: · Soreness, redness, or swelling where the shot was given · Hoarseness · Sore, red or itchy eyes · Cough · Fever · Aches · Headache · Itching · Fatigue If these problems occur, they usually begin soon after the shot and last 1 or 2 days. More serious problems following a flu shot can include the following: · There may be a small increased risk of Guillain-Barré Syndrome (GBS) after inactivated flu vaccine. This risk has been estimated at 1 or 2 additional cases per million people vaccinated. This is much lower than the risk of severe complications from flu, which can be prevented by flu vaccine. · Ricardo Wheatley children who get the flu shot along with pneumococcal vaccine (PCV13) and/or DTaP vaccine at the same time might be slightly more likely to have a seizure caused by fever. Ask your doctor for more information. Tell your doctor if a child who is getting flu vaccine has ever had a seizure Problems that could happen after any injected vaccine: · People sometimes faint after a medical procedure, including vaccination. Sitting or lying down for about 15 minutes can help prevent fainting, and injuries caused by a fall. Tell your doctor if you feel dizzy, or have vision changes or ringing in the ears. · Some people get severe pain in the shoulder and have difficulty moving the arm where a shot was given. This happens very rarely. · Any medication can cause a severe allergic reaction. Such reactions from a vaccine are very rare, estimated at about 1 in a million doses, and would happen within a few minutes to a few hours after the vaccination. As with any medicine, there is a very remote chance of a vaccine causing a serious injury or death. The safety of vaccines is always being monitored. For more information, visit: www.cdc.gov/vaccinesafety/. What if there is a serious reaction? What should I look for? · Look for anything that concerns you, such as signs of a severe allergic reaction, very high fever, or unusual behavior. Signs of a severe allergic reaction can include hives, swelling of the face and throat, difficulty breathing, a fast heartbeat, dizziness, and weakness - usually within a few minutes to a few hours after the vaccination. What should I do?  
· If you think it is a severe allergic reaction or other emergency that can't wait, call 9-1-1 and get the person to the nearest hospital. Otherwise, call your doctor. · Reactions should be reported to the \"Vaccine Adverse Event Reporting System\" (VAERS). Your doctor should file this report, or you can do it yourself through the VAERS website at www.vaers. [facility].gov, or by calling 8-758.108.3308. VAERS does not give medical advice. The National Vaccine Injury Compensation Program 
The National Vaccine Injury Compensation Program (VICP) is a federal program that was created to compensate people who may have been injured by certain vaccines. Persons who believe they may have been injured by a vaccine can learn about the program and about filing a claim by calling 6-507.176.7657 or visiting the [employer] website at www.[facility].gov/vaccinecompensation. There is a time limit to file a claim for compensation. How can I learn more? · Ask your healthcare provider. He or she can give you the vaccine package insert or suggest other sources of information. · Call your local or state health department. · Contact the Centers for Disease Control and Prevention (CDC): 
¨ Call 9-369.707.8563 (1-800-CDC-INFO) or ¨ Visit CDC's website at www.cdc.gov/flu Vaccine Information Statement Inactivated Influenza Vaccine 8/7/2015) [address] Rader [id number] [facility] and [employer] Centers for Disease Control and Prevention Many Vaccine Information Statements are available in [place] and other languages. See www.immunize.org/vis. Muchas hojas de información sobre vacunas están disponibles en español y en otros idiomas. Visite www.immunize.org/vis. Care instructions adapted under license by [employer] (which disclaims liability or warranty for this information). If you have questions about a medical condition or this instruction, always ask your healthcare professional. [place] [address] any warranty or liability for your use of this information. Introducing [facility] & HEALTH SERVICES! Dear Isabela Merino: 
Thank you for requesting a [employer] account. Our records indicate that you already have an active [employer] account. You can access your account anytime at [email or web address] Did you know that you can access your hospital and ER discharge instructions at any time in [employer]? You can also review all of your test results from your hospital stay or ER visit. Additional Information If you have questions, please visit the Frequently Asked Questions section of the [employer] website at [email or web address] Remember, [employer] is NOT to be used for urgent needs. For medical emergencies, dial 911. Now available from your iPhone and Android! Please provide this summary of care documentation to your next provider. Your primary care clinician is listed as Tami Amaral. If you have any questions after today's visit, please call [phone].

## 2017-10-26 NOTE — PROGRESS NOTES
Chief Complaint   Patient presents with    Follow-up     ED follow up-Passed out    Immunization/Injection     Influenza       Pt is a 57y.o. year old female who presents for follow up of her chronic medical problems    Health Maintenance Due   Topic Date Due    ZOSTER VACCINE AGE 60>  10/12/2014-at her drug store    INFLUENZA AGE 9 TO ADULT  08/01/2017-today     Last colonoscopy done-in media      Wt Readings from Last 3 Encounters:   10/26/17 168 lb (76.2 kg)   06/20/17 174 lb (78.9 kg)   02/17/17 170 lb (77.1 kg)     Prior to syncopal episode-went to weight loss clinic ([facility])-was taking Maxzide, Metformin, Topamax, Diethylpropion  Told diabetic, high cholesterol  Labs were non fasting from the weight loss clinic-reviewed/placed in chart: elevated cholesterol levels    ER records reviewed: syncope-work up neg including CT head, cardiac enzymes  UTI-mixed culture, no antibiotics given      Lab Results   Component Value Date/Time    Cholesterol, total 199 06/20/2017 01:12 PM    HDL Cholesterol 71 06/20/2017 01:12 PM    LDL, calculated 117 06/20/2017 01:12 PM    VLDL, calculated 11 06/20/2017 01:12 PM    Triglyceride 55 06/20/2017 01:12 PM    CHOL/HDL Ratio 2.8 04/14/2016 08:42 AM   Discussed LDL or bad cholesterol is slightly high but HDL or good cholesterol level is also high-discussed low fat diet    ROS:    Pt denies: Wt loss, Fever/Chills, HA, Visual changes, Fatigue, Chest pain, SOB, COPELAND, Abd pain, N/V/D/C, Blood in stool or urine, Edema. Pertinent positive as above in HPI. All others were negative    Patient Active Problem List   Diagnosis Code    Bursitis of shoulder, left M75.52    Subcutaneous mass-lt/ neck R22.9    Keratinous cyst-left neck L72.0    Osteoporosis M81.0    Primary osteoarthritis of left hip M16.12    Family history of CVA Z82.3    Obesity (BMI 30.0-34. 9) E66.9       Past Medical History:   Diagnosis Date    Arthritis     Diverticulitis            History Smoking Status    Never Smoker   Smokeless Tobacco    Never Used       Allergies   Allergen Reactions    Naprosyn [Naproxen] Itching    Strawberry Itching and Swelling     Per pt report       Patient Labs were reviewed: yes      Patient Past Records were reviewed:  yes        Objective:     Vitals:    10/26/17 1059   BP: 105/69   Pulse: 76   Resp: 18   Temp: 97.9 °F (36.6 °C)   TempSrc: Oral   SpO2: 97%   Weight: 168 lb (76.2 kg)   Height: 4' 11\" (1.499 m)     Body mass index is 33.93 kg/(m^2). Exam:   Appearance: alert, well appearing,  oriented to person, place, and time, acyanotic, in no respiratory distress and well hydrated. HEENT:  NC/AT, pink conj, anicteric sclerae  Neck:  No cervical lymphadenopathy, no JVD, no thyromegaly, no carotid bruit  Heart:  RRR without M/R/G  Lungs:  CTAB, no rhonchi, rales, or wheezes with good air exchange   Abdomen:  Non-tender, pos bowel sounds, no hepatosplenomegaly  Ext:  No C/C/E    Skin: no rash  Neuro: no lateralizing signs, CNs II-XII intact      Assessment/ Plan:   Diagnoses and all orders for this visit:    1. Syncope, unspecified syncope type-new presenting problem; likely from BP and DM meds given to her by the weight loss clinic ; advised to stop all these meds as she does not have HTN or DM    2. Obesity (BMI 30.0-34. 9)-discussed diet and exercise to lose weight    3. Encounter for immunization  -     INFLUENZA VIRUS VACCINE QUADRIVALENT, PRESERVATIVE FREE SYRINGE ([id number])        Follow-up Disposition:  Return in about 3 months (around 1/26/2018) for follow up. I have discussed the diagnosis with the patient and the intended plan as seen in the above orders. The patient has received an After-Visit Summary and questions were answered concerning future plans. Medication Side Effects and Warnings were discussed with patient: yes    Patient verbalized understanding of above instructions.     Abilio Gilbert MD  Internal Medicine  [facility]

## 2017-10-26 NOTE — PROGRESS NOTES
Chief Complaint   Patient presents with    Follow-up     ED follow up-Passed out    Immunization/Injection     Influenza     Patient is due for shingles vaccine. 1. Have you been to the ER, urgent care clinic since your last visit? Hospitalized since your last visit? Yes When: 10/24/2017     2. Have you seen or consulted any other health care providers outside of the [address] [place] [address] since your last visit? Include any pap smears or colon screening.  Yes Where: [facility]-Emergency Room

## 2017-10-26 NOTE — PATIENT INSTRUCTIONS

## 2017-10-31 DIAGNOSIS — J01.90 ACUTE NON-RECURRENT SINUSITIS, UNSPECIFIED LOCATION: Primary | ICD-10-CM

## 2017-10-31 RX ORDER — CEPHALEXIN 500 MG/1
500 CAPSULE ORAL 2 TIMES DAILY
Qty: 14 CAP | Refills: 0 | Status: SHIPPED | OUTPATIENT
Start: 2017-10-31 | End: 2017-11-07

## 2018-04-02 ENCOUNTER — APPOINTMENT (OUTPATIENT)
Dept: PHYSICAL THERAPY | Age: 63
End: 2018-04-02

## 2018-04-03 ENCOUNTER — HOSPITAL ENCOUNTER (OUTPATIENT)
Dept: PHYSICAL THERAPY | Age: 63
Discharge: HOME | End: 2018-04-03
Payer: COMMERCIAL

## 2018-04-03 PROCEDURE — 97110 THERAPEUTIC EXERCISES: CPT

## 2018-04-03 PROCEDURE — 97162 PT EVAL MOD COMPLEX 30 MIN: CPT

## 2018-04-03 NOTE — PROGRESS NOTES
[facility] PHYSICAL THERAPY AT [facility], [address] [facility] [address] - Phone: [phone]  Fax: [phone] / [address] [facility]  Patient Name: Vinicius Vazquez : 1955   Medical   Diagnosis: Left ankle pain [M25.572] Treatment Diagnosis: Left ankle pain   Onset Date: 2018     Referral Source: Johana Davis MD [facility]): 4/3/2018   Prior Hospitalization: See medical history Provider #: [id number]   Prior Level of Function: Independent and pain free ambulation   Comorbidities: Arthritis    Medications: Verified on Patient Summary List   The Plan of Care and following information is based on the information from the initial evaluation.   ===========================================================================================  Assessment / key information: Patient is a 60 y.o. female who presents to [facility] @ [address] [place] [address] with diagnosis of Left ankle pain [M25.572]. Patient reports L ankle pain that began 2018 after twisting the left ankle inwards after stepping on sheet of ice. Reports X-ray of left ankle revealed fracture of the 5th metatarsal per patient report. Most recent x-ray revealed incomplete healing of fracture. Reports wearing L LE boot for ~1 month s/p injury. MD HANNAH/ARBEN'ed boot 3/27/18 and patient was issued L ankle brace. Pain is described as a constant burning along the lateral aspect of the left ankle. Pain is rated at 7/10 at the best, 7/10 currently, and 10/10 at the worst. Pain increases with prolonged ambulation (15 min), prolonged standing, stair navigation, squatting, decreases with elevating the LEs.  Upon objective evaluation, patient demonstrates impaired and painful AROM of left ankle/foot as follows:  PF 42, DF -11, Inv 28, Ev -6, impaired strength of left ankle/foot as follows:  PF 2+/5, DF 4+/5, Inv 4/5, Ev 3/5, decreased L ankle stability/balance, and decreased flexibility of the gastroc and soleus. Patient is ambulating with antalgic gait pattern with gait deviations of decreased L LE heel strike with initial contact and decreased L ankle inversion during loading response. There is tenderness to palpation over near the ATFL and anterolateral aspect of the talus. There is 2 cm of edema with figure 8 measurement. Patient scored 43 on FOTO indicating decreased function and quality of life. Patient can benefit from PT to decrease left ankle ankle/foot pain and TTP, increase ROM, strength, flexibility, balance and joint mobility to improve functional ability.    ===========================================================================================  Problem List: pain affecting function, decrease ROM, decrease strength, edema affecting function, impaired gait/ balance, decrease ADL/ functional abilities, decrease activity tolerance, decrease flexibility/ joint mobility and decrease transfer abilities   Treatment Plan may include any combination of the following: Therapeutic exercise, Therapeutic activities, Neuromuscular re-education, Physical agent/modality, Gait/balance training, Manual therapy and Patient education  Patient / Family readiness to learn indicated by: asking questions, trying to perform skills and interest  Persons(s) to be included in education: patient (P)  Barriers to Learning/Limitations: no  Measures taken:    Patient Goal (s): \"pain relief, healing, tips and tricks, device suggestions\"   Patient self reported health status: good  Rehabilitation Potential: good   Short Term Goals: To be accomplished in  2  weeks:  1. Establish HEP. 2.  Pt will report 25% improvement in ability to tolerate prolonged standing. 3.  Increase L ankle AROM in all directions to pain free and WNLs to facilitate improved gait mechanics with ambulation.    4. Patient to demonstrate 10 sec of L SLS on firm surface in order to improve ease with ambulation on uneven terrains.  Long Term Goals: To be accomplished in  4-6  weeks:  1. Pt independent in HEP. 2.  Increase score on FOTO to 63 indicating improved function and quality of life. 3.  Increase L ankle strength in DF, IV, EV to 5-/5 to facilitate improve ability to tolerate prolonged standing. 4. Patient to demonstrate 30sec of L SLS on firm surface in order to improve ease with ambulation on uneven terrains. 5. Increase walking tolerance to 1 hour in order to improve ease with grocery shopping. 6. Patient to demonstrate reciprocal ascending and descending of 1 flight of stairs in order to return to PLOF. 7.  Increase L ankle strength in PF to 3/5 (Full ROM heel raise 1-9x) to facilitate improve ability to lift heels to reach into high cabinets. Frequency / Duration:   Patient to be seen  2-3  times per week for 4-6  weeks:  Patient / Caregiver education and instruction: self care, activity modification, brace/ splint application and exercises  G-Codes (GP): n/a  Eval Complexity: History: MEDIUM  Complexity : 1-2 comorbidities / personal factors will impact the outcome/ POC Exam:HIGH Complexity : 4+ Standardized tests and measures addressing body structure, function, activity limitation and / or participation in recreation  Presentation: MEDIUM Complexity : Evolving with changing characteristics  Clinical Decision Making:MEDIUM Complexity : FOTO score of 26-74Overall Complexity:MEDIUM      Therapist Signature: Archie Quintanilla Date: 0/3/7860   Certification Period: n/a Time: 8:31 AM   ===========================================================================================  I certify that the above Physical Therapy Services are being furnished while the patient is under my care. I agree with the treatment plan and certify that this therapy is necessary. Physician Signature:        Date:       Time:     Please sign and return to [facility] or you may fax the signed copy to [phone].   Thank you.

## 2018-04-03 NOTE — PROGRESS NOTES
PHYSICAL THERAPY - DAILY TREATMENT NOTE    Patient Name: Elio Gold        Date: 4/3/2018  : 1955   YES Patient  Verified  Visit #:   1     Insurance: Payor: Erin Lafleur / Plan: [address] PT / Product Type: Commerical /      In time: 11:09 am Out time: 12:08pm   Total Treatment Time: 59     Medicare Time Tracking (below)   Total Timed Codes (min):  n/a 1:1 Treatment Time:  n/a     TREATMENT AREA =  Left ankle pain [M25.572]  SUBJECTIVE  Pain Level (on 0 to 10 scale):  7  / 10   Medication Changes/New allergies or changes in medical history, any new surgeries or procedures? NO    If yes, update Summary List   Subjective Functional Status/Changes:  []  No changes reported     See POC       OBJECTIVE  Modalities Rationale:     decrease pain to improve patient's ability to perform functional ADLs   min [] Estim, type/location:                                      []  att     []  unatt     []  w/US     []  w/ice    []  w/heat    min []  Mechanical Traction: type/lbs                   []  pro   []  sup   []  int   []  cont    []  before manual    []  after manual    min []  Ultrasound, settings/location:      min []  Iontophoresis w/ dexamethasone, location:                                               []  take home patch       []  in clinic   10 min [x]  Ice     []  Heat    location/position: L ankle wrap with wedge    min []  Vasopneumatic Device, press/temp:     min []  Other:    [x] Skin assessment post-treatment (if applicable):    [x]  intact    []  redness- no adverse reaction     []redness - adverse reaction:        14 min Therapeutic Exercise:  [x]  See flow sheet   Rationale:      increase ROM and increase strength to improve the patients ability to tolerate prolonged standing. min Patient Education:  YES  Reviewed HEP   []  Progressed/Changed HEP based on:         Other Objective/Functional Measures:     Justification for Eval Complexity:   Patient History: MEDIUM  Complexity : 1-2 comorbidities / personal factors will impact the outcome/ POC  (Arthritis)  Examination:HIGH Complexity : 4+ Standardized tests and measures addressing body structure, function, activity limitation and / or participation in recreation  (See POC and musculoskeletal examination attached)  Clinical Presentation: MEDIUM Complexity : Evolving with changing characteristics  (pain level 7/10 on average and 10/10 @ worst, incomplete healing of fracture, constant pain)  Clinical Decision Making:MEDIUM Complexity : FOTO score of 26-74 (Foto 42/100)  Overall Complexity:MEDIUM     Post Treatment Pain Level (on 0 to 10) scale:   7  / 10     ASSESSMENT  Assessment/Changes in Function:     See POC     []  See Progress Note/Recertification   Patient will continue to benefit from skilled PT services to modify and progress therapeutic interventions, address functional mobility deficits, address ROM deficits, address strength deficits, analyze and address soft tissue restrictions, analyze and cue movement patterns, analyze and modify body mechanics/ergonomics and assess and modify postural abnormalities to attain remaining goals.    Progress toward goals / Updated goals:    See POC     PLAN  [x]  Upgrade activities as tolerated YES Continue plan of care   []  Discharge due to :    []  Other:      Therapist: Tyrel Laguna DPT     Date: 4/3/2018 Time: 8:29 AM     Future Appointments  Date Time Provider Zahra Hdez   4/3/2018 11:00 AM [facility]   5/22/2018 9:30 AM Edilma Mack MD [address] [facility] [address]

## 2018-04-09 ENCOUNTER — HOSPITAL ENCOUNTER (OUTPATIENT)
Dept: PHYSICAL THERAPY | Age: 63
Discharge: HOME | End: 2018-04-09
Payer: COMMERCIAL

## 2018-04-09 PROCEDURE — 97110 THERAPEUTIC EXERCISES: CPT

## 2018-04-09 NOTE — PROGRESS NOTES
PHYSICAL THERAPY - DAILY TREATMENT NOTE    Patient Name: Denice Lot        Date: 2018  : 1955   YES Patient  Verified  Visit #:     Insurance: Payor: Shirlene Walker / Plan: [address] [place] [address] Rd PT / Product Type: Commerical /      In time: 8:03 am Out time: 8:40 am   Total Treatment Time: 37   TREATMENT AREA =  Left ankle pain [M25.572]    SUBJECTIVE  Pain Level (on 0 to 10 scale):  5  / 10   Medication Changes/New allergies or changes in medical history, any new surgeries or procedures? NO    If yes, update Summary List   Subjective Functional Status/Changes:  []  No changes reported     The pain has been a little  Better because . I've been all the stuff she has given me to do. \"         OBJECTIVE  Modalities Rationale:     decrease edema, decrease inflammation, decrease pain and increase tissue extensibility to improve patient's ability to perform functional ADLs   min [] Estim, type/location:                                      []  att     []  unatt     []  w/US     []  w/ice    []  w/heat    min []  Mechanical Traction: type/lbs                   []  pro   []  sup   []  int   []  cont    []  before manual    []  after manual    min []  Ultrasound, settings/location:      min []  Iontophoresis w/ dexamethasone, location:                                               []  take home patch       []  in clinic   8 min [x]  Ice     []  Heat    location/position: Supine LE elevated    min []  Vasopneumatic Device, press/temp:     min []  Other:    [x] Skin assessment post-treatment (if applicable):    [x]  intact    []  redness- no adverse reaction     []redness - adverse reaction:        29 min Therapeutic Exercise:  [x]  See flow sheet   Rationale:      increase ROM and increase strength to improve the patients ability to perform standing and walking          throughout treatment min Patient Education:  YES  Reviewed HEP   [x]  Progressed/Changed HEP based on:  Decreased pain with exercise     Other Objective/Functional Measures: Add supine 4 way SLR, sit towel crunches, calf stretch  Updated written HEP     Post Treatment Pain Level (on 0 to 10) scale:   4  / 10     ASSESSMENT  Assessment/Changes in Function:     Pt reporting good pain relief with use of initial HEP. Advanced gentle hip strengthening, ankle ROM exercise. VCs and tactile cues for proper exercise technique in supine 4 way SLR. Pt challenged in prone hip ext and ABD. []  See Progress Note/Recertification   Patient will continue to benefit from skilled PT services to modify and progress therapeutic interventions, address functional mobility deficits, address ROM deficits, address strength deficits and instruct in home and community integration to attain remaining goals.    Progress toward goals / Updated goals:    [facility]     PLAN  []  Upgrade activities as tolerated YES Continue plan of care   []  Discharge due to :    []  Other:      Therapist: Pina Rizvi PTA    Date: 4/9/2018 Time: 8: 40  AM     Future Appointments  Date Time Provider Zahra Hdez   4/11/2018 7:30 AM Pina Ort, PTA [facility]   4/16/2018 8:00 AM Pina Ort, PTA [facility]   4/18/2018 8:00 AM Pina Ort, PTA [facility]   4/23/2018 8:00 AM Pina Ort, PTA [facility]   4/25/2018 8:00 AM Pina Ort, PTA [facility]   4/30/2018 8:00 AM Pina Ort, PTA [facility]   5/2/2018 8:00 AM Pina Ort, PTA [facility]   5/7/2018 8:00 AM Pina Ort, PTA [facility]   5/9/2018 8:00 AM Pina Ort, PTA [facility]   5/14/2018 8:00 AM Pina Ort, PTA [facility]   5/22/2018 9:30 AM oRnny Zapata MD [address] [facility] [address]

## 2018-04-11 ENCOUNTER — APPOINTMENT (OUTPATIENT)
Dept: PHYSICAL THERAPY | Age: 63
End: 2018-04-11
Payer: COMMERCIAL

## 2018-04-16 ENCOUNTER — APPOINTMENT (OUTPATIENT)
Dept: PHYSICAL THERAPY | Age: 63
End: 2018-04-16
Payer: COMMERCIAL

## 2018-04-18 ENCOUNTER — APPOINTMENT (OUTPATIENT)
Dept: PHYSICAL THERAPY | Age: 63
End: 2018-04-18
Payer: COMMERCIAL

## 2018-04-23 ENCOUNTER — APPOINTMENT (OUTPATIENT)
Dept: PHYSICAL THERAPY | Age: 63
End: 2018-04-23
Payer: COMMERCIAL

## 2018-04-25 ENCOUNTER — APPOINTMENT (OUTPATIENT)
Dept: PHYSICAL THERAPY | Age: 63
End: 2018-04-25
Payer: COMMERCIAL

## 2018-04-26 ENCOUNTER — HOSPITAL ENCOUNTER (OUTPATIENT)
Dept: PHYSICAL THERAPY | Age: 63
Discharge: HOME | End: 2018-04-26
Payer: COMMERCIAL

## 2018-04-26 PROCEDURE — 97110 THERAPEUTIC EXERCISES: CPT

## 2018-04-26 NOTE — PROGRESS NOTES
PT DAILY TREATMENT NOTE 8    Patient Name: Ann-Marie Hartman  Date:2018  : 1955  [x]  Patient  Verified  Payor: Melba Kowalski / Plan: [employer]  [facility] / Product Type: Commerical /    In time:7:58  Out time:9:10  Total Treatment Time (min): 72  Total Timed Codes (min): 56  1:1 Treatment Time (min):    Visit #: 3 of 8    Treatment Area: Left ankle pain [M25.572]    SUBJECTIVE  Pain Level (0-10 scale): 6/10  Any medication changes, allergies to medications, adverse drug reactions, diagnosis change, or new procedure performed?: [x] No    [] Yes (see summary sheet for update)  Subjective functional status/changes:   [] No changes reported  I feel most of the pain on the outside of my ankle especially when I do a lot of standing or walking    OBJECTIVE  Modality rationale: decrease inflammation and decrease pain to improve the patients ability to improve functional abilities   Min Type Additional Details    [] Estim: []Att   []Unatt        []TENS instruct                  []IFC  []Premod   []NMES                     []Other:  []w/US   []w/ice   []w/heat  Position:  Location:    []  Traction: [] Cervical       []Lumbar                       [] Prone          []Supine                       []Intermittent   []Continuous Lbs:  [] before manual  [] after manual    []  Ultrasound: []Continuous   [] Pulsed                           []1MHz   []3MHz Location:  W/cm2:    []  Iontophoresis with dexamethasone         Location: [] Take home patch   [] In clinic   10 [x]  Ice     []  heat  []  Ice massage Position:long sitting   Location:left ankle    []  Vasopneumatic Device Pressure:       [] lo [] med [] hi   Temperature: [] lo [] med [] hi   [] Skin assessment post-treatment:  []intact []redness- no adverse reaction       []redness - adverse reaction:       62 min Therapeutic Exercise:  [x] See flow sheet :   Rationale: increase ROM, increase strength, improve balance and increase proprioception to improve the patients ability to improve functional abilities         min Patient Education: [x] Review HEP    [] Progressed/Changed HEP based on:   [] positioning   [] body mechanics   [] transfers   [] heat/ice application        Other Objective/Functional Measures:     Pain Level (0-10 scale) post treatment: 0    ASSESSMENT/Changes in Function: Pt presents with chief c/o increased lateral ankle pain with prolonged standing/walking ADL's. Pt needs most focus on ankle mobility and strengthening/stabilization. Pt was challenged the most with heel raises and SLS today. Will continue to progress/advance patient within current POC as tolerated with monitoring symptoms. Patient will continue to benefit from skilled PT services to modify and progress therapeutic interventions, address functional mobility deficits, address ROM deficits, address strength deficits and analyze and cue movement patterns to attain remaining goals.      []  See Plan of Care  []  See progress note/recertification  []  See Discharge Summary         Progress towards goals / Updated goals:      PLAN  [x]  Upgrade activities as tolerated     []  Continue plan of care  []  Update interventions per flow sheet       []  Discharge due to:_  []  Other:_      Jonatan Mann PTA 4/26/2018  8:09 AM

## 2018-04-27 ENCOUNTER — HOSPITAL ENCOUNTER (OUTPATIENT)
Dept: PHYSICAL THERAPY | Age: 63
Discharge: HOME | End: 2018-04-27
Payer: COMMERCIAL

## 2018-04-27 PROCEDURE — 97110 THERAPEUTIC EXERCISES: CPT

## 2018-04-27 NOTE — PROGRESS NOTES
PT DAILY TREATMENT NOTE     Patient Name: Porsha Wallis  Date:2018  : 1955  [x]  Patient  Verified  Payor: Rolf Martínez / Plan: [employer]  [facility] / Product Type: Commerical /    In time:8:00  Out time:8:53  Total Treatment Time (min): 53  Total Timed Codes (min): 47  1:1 Treatment Time (min):    Visit #: 4 of 8    Treatment Area: Left ankle pain [M25.572]    SUBJECTIVE  Pain Level (0-10 scale): 5/10  Any medication changes, allergies to medications, adverse drug reactions, diagnosis change, or new procedure performed?: [x] No    [] Yes (see summary sheet for update)  Subjective functional status/changes:   [] No changes reported  My calf and ankle was sore for a little while after the last time that I was here, but it feels better today. OBJECTIVE      53 min Therapeutic Exercise:  [x] See flow sheet :   Rationale: increase ROM, increase strength, improve balance and increase proprioception to improve the patients ability to improve functional abilities           min Patient Education: [x] Review HEP    [] Progressed/Changed HEP based on:   [] positioning   [] body mechanics   [] transfers   [] heat/ice application        Other Objective/Functional Measures:     Pain Level (0-10 scale) post treatment: 0    ASSESSMENT/Changes in Function: Pt presented with chief c/o mild post exercise soreness after last treatment but was able to tolerate same regiment today with min to mod challenge. Pt had noted difficulty with dorsiflexion strength and inversion mobility during treatment today. Will continue to progress/advance patient within current POC as tolerated with monitoring symptoms. Patient will continue to benefit from skilled PT services to modify and progress therapeutic interventions, address functional mobility deficits, address ROM deficits, address strength deficits and analyze and cue movement patterns to attain remaining goals.      []  See Plan of Care  []  See progress note/recertification  []  See Discharge Summary         Progress towards goals / Updated goals:      PLAN  [x]  Upgrade activities as tolerated     []  Continue plan of care  []  Update interventions per flow sheet       []  Discharge due to:_  []  Other:_      Frankey Horseman, CROW 4/27/2018  8:00 AM

## 2018-04-30 ENCOUNTER — APPOINTMENT (OUTPATIENT)
Dept: PHYSICAL THERAPY | Age: 63
End: 2018-04-30
Payer: COMMERCIAL

## 2018-05-01 ENCOUNTER — APPOINTMENT (OUTPATIENT)
Dept: PHYSICAL THERAPY | Age: 63
End: 2018-05-01
Payer: COMMERCIAL

## 2018-05-02 ENCOUNTER — APPOINTMENT (OUTPATIENT)
Dept: PHYSICAL THERAPY | Age: 63
End: 2018-05-02

## 2018-05-03 ENCOUNTER — HOSPITAL ENCOUNTER (OUTPATIENT)
Dept: PHYSICAL THERAPY | Age: 63
Discharge: HOME | End: 2018-05-03
Payer: COMMERCIAL

## 2018-05-03 PROCEDURE — 97110 THERAPEUTIC EXERCISES: CPT

## 2018-05-03 NOTE — PROGRESS NOTES
PT DAILY TREATMENT NOTE 8    Patient Name: Samuel Do  Date:5/3/2018  : 1955  [x]  Patient  Verified  Payor: Jai Ocampo / Plan: [place] [employer]  CAPITATED PT / Product Type: Commerical /    In time:7:39  Out time:8:45  Total Treatment Time (min): 66  Total Timed Codes (min): 50  1:1 Treatment Time (min):    Visit #: 5 of 8    Treatment Area: Left ankle pain [M25.572]    SUBJECTIVE  Pain Level (0-10 scale): 0  Any medication changes, allergies to medications, adverse drug reactions, diagnosis change, or new procedure performed?: [x] No    [] Yes (see summary sheet for update)  Subjective functional status/changes:   [] No changes reported  My ankle feels really good today, no real pain to speak of today.     OBJECTIVE  Modality rationale: decrease inflammation and decrease pain to improve the patients ability to improve functional abilities   Min Type Additional Details    [] Estim: []Att   []Unatt        []TENS instruct                  []IFC  []Premod   []NMES                     []Other:  []w/US   []w/ice   []w/heat  Position:  Location:    []  Traction: [] Cervical       []Lumbar                       [] Prone          []Supine                       []Intermittent   []Continuous Lbs:  [] before manual  [] after manual    []  Ultrasound: []Continuous   [] Pulsed                           []1MHz   []3MHz Location:  W/cm2:    []  Iontophoresis with dexamethasone         Location: [] Take home patch   [] In clinic   10 [x]  Ice     []  heat  []  Ice massage Position:long sitting   Location:left ankle    []  Vasopneumatic Device Pressure:       [] lo [] med [] hi   Temperature: [] lo [] med [] hi   [] Skin assessment post-treatment:  []intact []redness- no adverse reaction       []redness - adverse reaction:       56 min Therapeutic Exercise:  [x] See flow sheet :   Rationale: increase ROM, increase strength, improve balance and increase proprioception to improve the patients ability to improve functional abilities           min Patient Education: [x] Review HEP    [] Progressed/Changed HEP based on:   [] positioning   [] body mechanics   [] transfers   [] heat/ice application        Other Objective/Functional Measures:     Pain Level (0-10 scale) post treatment: 0    ASSESSMENT/Changes in Function: Pt was able to advance to advance to foam with SLS with moderate challenge with proprioceptive/balance awareness today. Pt reports no reoccurrence of ankle pain/symptoms since last treatment. Will review detailed progress/goals for physician update next treatment with continuing to progress/advance within current POC/protocol as tolerated. Patient will continue to benefit from skilled PT services to modify and progress therapeutic interventions, address functional mobility deficits, address ROM deficits, address strength deficits and analyze and cue movement patterns to attain remaining goals.      []  See Plan of Care  []  See progress note/recertification  []  See Discharge Summary         Progress towards goals / Updated goals:      PLAN  [x]  Upgrade activities as tolerated     []  Continue plan of care  []  Update interventions per flow sheet       []  Discharge due to:_  []  Other:_      Deshawn Brothers PTA 5/3/2018  8:21 AM

## 2018-05-04 ENCOUNTER — HOSPITAL ENCOUNTER (OUTPATIENT)
Dept: PHYSICAL THERAPY | Age: 63
Discharge: HOME | End: 2018-05-04
Payer: COMMERCIAL

## 2018-05-04 PROCEDURE — 97110 THERAPEUTIC EXERCISES: CPT

## 2018-05-04 NOTE — PROGRESS NOTES
PT DAILY TREATMENT NOTE     Patient Name: Kam Santiago  Date:2018  : 1955  [x]  Patient  Verified  Payor: Jesús Fish / Plan: [employer]  CAPITATED PT / Product Type: Commerical /    In time:9:00  Out time:10:07  Total Treatment Time (min): 67  Total Timed Codes (min): 51  1:1 Treatment Time (min):    Visit #: 6  8    Treatment Area: Left ankle pain [M25.572]    SUBJECTIVE  Pain Level (0-10 scale): 6/10  Any medication changes, allergies to medications, adverse drug reactions, diagnosis change, or new procedure performed?: [x] No    [] Yes (see summary sheet for update)  Subjective functional status/changes:   [] No changes reported  The outside of my foot and ankle has been hurting a little bit more since I was here last, but I did a lot of standing and walking at work this morning which may have had something to do with it.     OBJECTIVE  Modality rationale: decrease inflammation and decrease pain to improve the patients ability to improve functional abilities   Min Type Additional Details    [] Estim: []Att   []Unatt        []TENS instruct                  []IFC  []Premod   []NMES                     []Other:  []w/US   []w/ice   []w/heat  Position:  Location:    []  Traction: [] Cervical       []Lumbar                       [] Prone          []Supine                       []Intermittent   []Continuous Lbs:  [] before manual  [] after manual    []  Ultrasound: []Continuous   [] Pulsed                           []1MHz   []3MHz Location:  W/cm2:    []  Iontophoresis with dexamethasone         Location: [] Take home patch   [] In clinic   10 [x]  Ice     []  heat  []  Ice massage Position:supine  Location:left ankle    []  Vasopneumatic Device Pressure:       [] lo [] med [] hi   Temperature: [] lo [] med [] hi   [] Skin assessment post-treatment:  []intact []redness- no adverse reaction       []redness - adverse reaction:       57 min Therapeutic Exercise:  [x] See flow sheet :   Rationale: increase ROM, increase strength, improve balance and increase proprioception to improve the patients ability to improve functional abilities           min Patient Education: [x] Review HEP    [] Progressed/Changed HEP based on:   [] positioning   [] body mechanics   [] transfers   [] heat/ice application        Other Objective/Functional Measures:     Pain Level (0-10 scale) post treatment: 0    ASSESSMENT/Changes in Function: Pt was able to advance to addition of STAR taps on green oval with moderate challenge with proprioceptive/balance awareness as well as increasing theraband resistance with 4 way ankle exercises with min to mod challenge today. Will continue to progress/advance patient within current POC as tolerated with monitoring symptoms. Patient will continue to benefit from skilled PT services to modify and progress therapeutic interventions, address functional mobility deficits, address ROM deficits, address strength deficits and analyze and cue movement patterns to attain remaining goals.      []  See Plan of Care  []  See progress note/recertification  []  See Discharge Summary         Progress towards goals / Updated goals:      PLAN  [x]  Upgrade activities as tolerated     []  Continue plan of care  []  Update interventions per flow sheet       []  Discharge due to:_  []  Other:_      Leonides Chadwick, CROW 5/4/2018  9:07 AM

## 2018-05-07 ENCOUNTER — APPOINTMENT (OUTPATIENT)
Dept: PHYSICAL THERAPY | Age: 63
End: 2018-05-07

## 2018-05-08 ENCOUNTER — HOSPITAL ENCOUNTER (OUTPATIENT)
Dept: PHYSICAL THERAPY | Age: 63
Discharge: HOME | End: 2018-05-08
Payer: COMMERCIAL

## 2018-05-08 PROCEDURE — 97110 THERAPEUTIC EXERCISES: CPT

## 2018-05-08 NOTE — PROGRESS NOTES
[facility] AT [place] [address], [place], [address]  Phone: [phone]   [phone]  PROGRESS NOTE  Patient Name: Natividad Roman : 1955   Treatment/Medical Diagnosis: Left ankle pain [M25.572]   Referral Source: Gregg Kraft MD     Date of Initial Visit: 4/3/18 Attended Visits: 7 Missed Visits: 2     SUMMARY OF TREATMENT  Therapeutic exercise for left LE/ankle focus mobility and strengthening, ankle stabilization, proprioceptive/balance awareness, cryotherapy and HEP. CURRENT STATUS  Patient reports approximately 98% overall improvement from therapy since initial evaluation with no reported pain at all in >1 week. Pt has made excellent progress with gaining ankle mobility in all planes as well as advancing within ankle strengthening and stabilization program. Pt has resumed all premorbid ADL's without difficulty over the past few treatments and agrees that she is near maximum medical benefit/potential from current POC. Pt will be appropriate for discharge from therapy as planned next treatment. Will continue to progress/advance patient within current POC as tolerated with monitoring symptoms. Left ankle strength measurements/MMT= PF=4+/5; DF=5/5; INV=5/5; EVR=4+/5    Goal/Measure of Progress Goal Met? 1. Increase score on FOTO to 63 indicating improved function and quality of life. Status at last Eval: 42/100 Current Status: 69/100 yes   2. Patient to demonstrate 30sec of L SLS on firm surface in order to improve ease with ambulation on uneven terrains. Status at last Eval: Progressing Current Status: Met yes   3. Increase walking tolerance to 1 hour in order to improve ease with grocery shopping. Status at last Eval: Progressing Current Status: Met yes   4. Patient to demonstrate reciprocal ascending and descending of 1 flight of stairs in order to return to PLOF.    Status at last Eval: Progressing  Current Status: Met yes     New Goals to be achieved in __1__  treatments:  1. Patient to demonstrate 10 seconds of L SLS on foam without UE support in order to improve ease with ambulation on uneven terrains. 2.  Pt will be given and instructed on updated HEP for continued maintenance of decreased symptoms and improved ankle mobility and strength after discharge from therapy. RECOMMENDATIONS  Continue with current POC for 1 remaining visit left on current script with advancing as tolerated, then reassess for discharge from therapy as planned/discussed. If you have any questions/comments please contact us directly at [phone]. Thank you for allowing us to assist in the care of your patient. LPTA Signature: Jeanie Blandon PTA  Date: 5/8/2018   PT Signature: ELIZABETH Lal, ONESIMO Time: 8:00 AM   NOTE TO PHYSICIAN:  PLEASE COMPLETE THE ORDERS BELOW AND FAX TO   [facility] at [facility] UNIT: [phone]. If you are unable to process this request in 24 hours please contact our office: [phone].    ___ I have read the above report and request that my patient continue as recommended.   ___ I have read the above report and request that my patient continue therapy with the following changes/special instructions:_________________________________________________________   ___ I have read the above report and request that my patient be discharged from therapy.      Physician Signature:        Date:       Time:

## 2018-05-08 NOTE — PROGRESS NOTES
PT DAILY TREATMENT NOTE     Patient Name: Mike Pepe  Date:2018  : 1955  [x]  Patient  Verified  Payor: Aneudy Backer / Plan: [employer]  [facility] Product Type: Commerical /    In time:8:10  Out time:9:22  Total Treatment Time (min): 72  Total Timed Codes (min): 56  1:1 Treatment Time (min):    Visit #: 7 of 8    Treatment Area: Left ankle pain [M25.572]    SUBJECTIVE  Pain Level (0-10 scale): 0  Any medication changes, allergies to medications, adverse drug reactions, diagnosis change, or new procedure performed?: [x] No    [] Yes (see summary sheet for update)  Subjective functional status/changes:   [] No changes reported  My ankle has been feeling pretty good, I can't even remember when the last time that I had any pain was, the only thing that I have a slight problem with is my balance.     OBJECTIVE  Modality rationale: decrease inflammation and decrease pain to improve the patients ability to improve functional abilities   Min Type Additional Details    [] Estim: []Att   []Unatt        []TENS instruct                  []IFC  []Premod   []NMES                     []Other:  []w/US   []w/ice   []w/heat  Position:  Location:    []  Traction: [] Cervical       []Lumbar                       [] Prone          []Supine                       []Intermittent   []Continuous Lbs:  [] before manual  [] after manual    []  Ultrasound: []Continuous   [] Pulsed                           []1MHz   []3MHz Location:  W/cm2:    []  Iontophoresis with dexamethasone         Location: [] Take home patch   [] In clinic   10 [x]  Ice     []  heat  []  Ice massage Position:supine  Location:left ankle    []  Vasopneumatic Device Pressure:       [] lo [] med [] hi   Temperature: [] lo [] med [] hi   [] Skin assessment post-treatment:  []intact []redness- no adverse reaction       []redness - adverse reaction:       62 min Therapeutic Exercise:  [x] See flow sheet :   Rationale: increase ROM, increase strength, improve balance and increase proprioception to improve the patients ability to improve functional abilities           min Patient Education: [x] Review HEP    [] Progressed/Changed HEP based on:   [] positioning   [] body mechanics   [] transfers   [] heat/ice application        Other Objective/Functional Measures:     Pain Level (0-10 scale) post treatment: 0    ASSESSMENT/Changes in Function:     Patient will continue to benefit from skilled PT services to modify and progress therapeutic interventions, address functional mobility deficits, address ROM deficits, address strength deficits and analyze and cue movement patterns to attain remaining goals. []  See Plan of Care  [x]  See progress note/recertification  []  See Discharge Summary         Progress towards goals / Updated goals:  See Progress note/Physician update for full detailed progress towards established goals.     PLAN  [x]  Upgrade activities as tolerated     []  Continue plan of care  []  Update interventions per flow sheet       []  Discharge due to:_  []  Other:_      Noble Goyal, CROW 5/8/2018  7:49 AM

## 2018-05-09 ENCOUNTER — APPOINTMENT (OUTPATIENT)
Dept: PHYSICAL THERAPY | Age: 63
End: 2018-05-09

## 2018-05-10 ENCOUNTER — HOSPITAL ENCOUNTER (OUTPATIENT)
Dept: PHYSICAL THERAPY | Age: 63
Discharge: HOME | End: 2018-05-10
Payer: COMMERCIAL

## 2018-05-10 PROCEDURE — 97110 THERAPEUTIC EXERCISES: CPT

## 2018-05-10 NOTE — PROGRESS NOTES
[facility] AT [address], [address] [place] [address] Road  Phone: [phone]   [phone]  DISCHARGE SUMMARY  Patient Name: Evangelina Berry : 1955   Treatment/Medical Diagnosis: Left ankle pain [M25.572]   Referral Source: Monique Flores MD     Date of Initial Visit: 4/3/18 Attended Visits: 8 Missed Visits: 2     SUMMARY OF TREATMENT  Therapeutic exercise for left LE/ankle focus mobility and strengthening, ankle stabilization, proprioceptive/balance awareness, cryotherapy and HEP. CURRENT STATUS  Patient reports approximately 98% overall improvement from therapy since initial evaluation with no reported pain at all in >2 weeks. Pt has made excellent progress with gaining ankle mobility in all planes as well as advancing within ankle strengthening and stabilization program. Pt has resumed all premorbid ADL's without difficulty over the past few treatments and agrees that she has achieved maximum medical benefit/potential from current POC and is ready for discharge from therapy at this time after completing 8 of 8 prescribed visits. Left ankle strength measurements/MMT= PF=4+/5; DF=5/5; INV=5/5; EVR=4+/5    Goal/Measure of Progress Goal Met? 1. Patient to demonstrate 10 seconds of L SLS on foam without UE support in order to improve ease with ambulation on uneven terrains. Status at last Eval: Progressing Current Status: Improved, but still need 1 fingertip support no   2. Pt will be given and instructed on updated HEP for continued maintenance of decreased symptoms and improved ankle mobility and strength after discharge from therapy. Status at last Eval: Progressing  Current Status: Met yes     RECOMMENDATIONS  Patient has achieved maximum medical benefit/potential from current POC after completing 8 of 8 prescribed visits and is ready for discharge from therapy at this time.     If you have any questions/comments please contact us directly at [phone]. Thank you for allowing us to assist in the care of your patient.     LPTA Signature: Belinda Wilder PTA Date: 5/10/18   Therapist Signature: ELIZABETH Mina, ONESIMO   Time: 7:46 AM

## 2018-05-14 ENCOUNTER — APPOINTMENT (OUTPATIENT)
Dept: PHYSICAL THERAPY | Age: 63
End: 2018-05-14

## 2018-05-15 ENCOUNTER — APPOINTMENT (OUTPATIENT)
Dept: PHYSICAL THERAPY | Age: 63
End: 2018-05-15
Payer: COMMERCIAL

## 2018-05-17 ENCOUNTER — APPOINTMENT (OUTPATIENT)
Dept: PHYSICAL THERAPY | Age: 63
End: 2018-05-17
Payer: COMMERCIAL

## 2018-05-22 ENCOUNTER — OFFICE VISIT (OUTPATIENT)
Dept: FAMILY MEDICINE CLINIC | Age: 63
End: 2018-05-22

## 2018-05-22 VITALS
HEART RATE: 98 BPM | OXYGEN SATURATION: 96 % | SYSTOLIC BLOOD PRESSURE: 125 MMHG | RESPIRATION RATE: 17 BRPM | BODY MASS INDEX: 35 KG/M2 | DIASTOLIC BLOOD PRESSURE: 80 MMHG | TEMPERATURE: 97.9 F | WEIGHT: 173.6 LBS | HEIGHT: 59 IN

## 2018-05-22 DIAGNOSIS — E78.5 HYPERLIPIDEMIA, UNSPECIFIED HYPERLIPIDEMIA TYPE: Primary | ICD-10-CM

## 2018-05-22 DIAGNOSIS — R73.03 PRE-DIABETES: ICD-10-CM

## 2018-05-22 DIAGNOSIS — M85.80 OSTEOPENIA, UNSPECIFIED LOCATION: ICD-10-CM

## 2018-05-22 DIAGNOSIS — Z12.39 SCREENING FOR BREAST CANCER: ICD-10-CM

## 2018-05-22 DIAGNOSIS — E66.9 OBESITY (BMI 30.0-34.9): ICD-10-CM

## 2018-05-22 NOTE — PATIENT INSTRUCTIONS

## 2018-05-22 NOTE — LETTER
6/4/2018 4:49 PM 
 
[facility] [address] [id number] Dear Sherley Joshi: 
 
Please find your most recent results below. Resulted Orders HEMOGLOBIN A1C W/O EAG Result Value Ref Range Hemoglobin A1c 5.9 4.8 - 5.9 % AVG  (H) 91 - 123 mg/dL Narrative Unless additionally indicated, test performed at: [facility], [address] [place] [address]. PH: [phone]. LIPID PANEL Result Value Ref Range Triglyceride 70 40 - 149 mg/dL HDL Cholesterol 80 (H) 40 - 59 mg/dL Cholesterol, total 219 (H) 110 - 200 mg/dL CHOLESTEROL/HDL 2.7 0.0 - 5.0 LDL, calculated 125 (H) 50 - 99 mg/dL VLDL, calculated 14 8 - 30 mg/dL Comment:  
   Test includes cholesterol, HDL cholesterol, triglycerides and LDL. Cholesterol Recommended NCEP guidelines in mg/dL: 
Less than 200            Desirable 200 - 239                Borderline High Greater than or  = 240   High Please Note: Total Chol/HDL Ratio Men     Women 1/2 Avg. Risk    3.4     3.3 Avg. Risk    5.0     4.4 
2X  Avg. Risk    9.6     7.1 3X  Avg. Risk   23.4    11.0 Narrative Unless additionally indicated, test [facility]: [facility], [address] [place] [address]. PH: [phone]. TSH 3RD GENERATION Result Value Ref Range TSH 0.79 0.27 - 4.20 mcU/mL Narrative Unless additionally indicated, test performed at: [facility], [address] [place] [address]. PH: [phone]. METABOLIC PANEL, COMPREHENSIVE Result Value Ref Range Glucose 97 70 - 99 mg/dL BUN 11 6 - 22 mg/dL Creatinine 0.8 0.8 - 1.4 mg/dL Sodium 144 133 - 145 mmol/L Potassium 4.3 3.5 - 5.5 mmol/L Chloride 103 98 - 110 mmol/L  
 CO2 25 20 - 32 mmol/L  
 AST (SGOT) 17 10 - 37 U/L  
 ALT (SGPT) 21 5 - 40 U/L Alk.  phosphatase 73 40 - 120 U/L  
 Bilirubin, total 0.3 0.2 - 1.2 mg/dL Calcium 9.2 8.4 - 10.5 mg/dL Protein, total 7.1 6.2 - 8.1 g/dL Albumin 4.2 3.5 - 5.0 g/dL A-G Ratio 1.4 1.1 - 2.6 ratio Globulin 2.9 2.0 - 4.0 g/dL Anion gap 16.0 mmol/L Comment:  
   Test includes Albumin, Alkaline Phosphatase, ALT, AST, BUN, Calcium, CO2, Chloride, Creatinine, Glucose, Potassium, Sodium, Total Bilirubin and Total 
Protein. Estimated GFR results are reported in mL/min/1.73 sq.m. by the MDRD equation. This eGFR is validated for stable chronic renal failure patients. This  
equation 
is unreliable in acute illness or patients with normal renal function. GFRAA >60.0 >60.0 GFRNA >60.0 >60.0 Narrative Unless additionally indicated, test performed at: [address], [address] [place] [address]. PH: [phone]. RECOMMENDATIONS: 
Cholesterol went up a bit, but HDL or good cholesterol is high so just continue to watch diet. Thyroid test normal  
Blood sugar normal  
 
Please call me if you have any questions: [phone] Sincerely, Mckay Morel MD

## 2018-05-22 NOTE — MR AVS SNAPSHOT
Lizbet Shukla [address] [address] [facility] [address] [facility] 83 [id number] 
[phone] Patient: Suman Varner MRN: [id number] :1955 Visit Information Date & Time Provider Department Dept. Phone Encounter #  
 2018  9:30 AM Elizabet Concepcion MD [facility] 13 [id number] Follow-up Instructions Return in about 6 months (around 2018) for follow up, physical.  
  
Upcoming Health Maintenance Date Due ZOSTER VACCINE AGE 60> 10/12/2014 PAP AKA CERVICAL CYTOLOGY 2018 Influenza Age 5 to Adult 2018 BREAST CANCER SCRN MAMMOGRAM 2019 COLONOSCOPY 2022 DTaP/Tdap/Td series (2 - Td) 2024 Allergies as of 2018  Review Complete On: 2018 By: Elizabet Concepcion MD  
  
 Severity Noted Reaction Type Reactions Latex  2018    Rash Naprosyn [Naproxen]  10/02/2013    Itching [place]  2015    Itching, Swelling Per pt report Current Immunizations  Reviewed on 2017 Name Date Influenza Vaccine 2014, 10/2/2013 Influenza Vaccine (Quad) PF 10/26/2017, 2016, 2015 Influenza Vaccine Split 10/5/2012 Tdap 2014 Not reviewed this visit You Were Diagnosed With   
  
 Codes Comments Obesity (BMI 30.0-34.9)    -  Primary ICD-10-CM: L83.7 ICD-9-CM: 278.00 Osteopenia, unspecified location     ICD-10-CM: M85.80 ICD-9-CM: 733.90 Hyperlipidemia, unspecified hyperlipidemia type     ICD-10-CM: E78.5 ICD-9-CM: 272.4 Pre-diabetes     ICD-10-CM: R73.03 
ICD-9-CM: 790.29 Vitals BP Pulse Temp Resp Height(growth percentile) Weight(growth percentile) 125/80 (BP 1 Location: Left arm, BP Patient Position: Sitting) 98 97.9 °F (36.6 °C) (Oral) 17 4' 11\" (1.499 m) 173 lb 9.6 oz (78.7 kg) SpO2 BMI OB Status Smoking Status 96% 35.06 kg/m2 Postmenopausal Never Smoker Vitals History BMI and BSA Data Body Mass Index Body Surface Area 35.06 kg/m 2 1.81 m 2 Preferred Pharmacy Pharmacy Name Phone Gloria Mckeon [address] [facility], [address] [place] [address] [place]  Sigrid Fisher [phone] Your Updated Medication List  
  
   
This list is accurate as of 5/22/18  9:56 AM.  Always use your most recent med list.  
  
  
  
  
 CALCIUM PO Take  by mouth. FISH OIL PO Take  by mouth. HYDROcodone-acetaminophen 5-325 mg per tablet Commonly known as:  Elizabeth Garcia Take 1 Tab by mouth every four (4) hours as needed for Pain. Max Daily Amount: 6 Tabs. VITAMIN B12 PO Take  by mouth. VITAMIN D3 PO Take  by mouth. Follow-up Instructions Return in about 6 months (around 11/22/2018) for follow up, physical.  
  
To-Do List   
 05/22/2018 Lab:  HEMOGLOBIN A1C W/O EAG   
  
 05/22/2018 Lab:  LIPID PANEL   
  
 05/22/2018 Lab:  METABOLIC PANEL, COMPREHENSIVE   
  
 05/22/2018 Lab:  TSH 3RD GENERATION Patient Instructions Starting a Weight Loss Plan: Care Instructions Your Care Instructions If you are thinking about losing weight, it can be hard to know where to start. Your doctor can help you set up a weight loss plan that best meets your needs. You may want to take a class on nutrition or exercise, or join a weight loss support group. If you have questions about how to make changes to your eating or exercise habits, ask your doctor about seeing a registered dietitian or an exercise specialist. 
It can be a big challenge to lose weight. But you do not have to make huge changes at once. Make small changes, and stick with them. When those changes become habit, add a few more changes. If you do not think you are ready to make changes right now, try to pick a date in the future. Make an appointment to see your doctor to discuss whether the time is right for you to start a plan. Follow-up care is a key part of your treatment and safety. Be sure to make and go to all appointments, and call your doctor if you are having problems. It's also a good idea to know your test results and keep a list of the medicines you take. How can you care for yourself at home? · Set realistic goals. Many people expect to lose much more weight than is likely. A weight loss of 5% to 10% of your body weight may be enough to improve your health. · Get family and friends involved to provide support. Talk to them about why you are trying to lose weight, and ask them to help. They can help by participating in exercise and having meals with you, even if they may be eating something different. · Find what works best for you. If you do not have time or do not like to cook, a program that offers meal replacement bars or shakes may be better for you. Or if you like to prepare meals, finding a plan that includes daily menus and recipes may be best. 
· Ask your doctor about other health professionals who can help you achieve your weight loss goals. ¨ A dietitian can help you make healthy changes in your diet. ¨ An exercise specialist or  can help you develop a safe and effective exercise program. 
¨ A counselor or psychiatrist can help you cope with issues such as depression, anxiety, or family problems that can make it hard to focus on weight loss. · Consider joining a support group for people who are trying to lose weight. Your doctor can suggest groups in your area. Where can you learn more? Go to http://ada-korina.info/. Enter J048 in the search box to learn more about \"Starting a Weight Loss Plan: Care Instructions. \" Current as of: October 13, 2016 Content Version: 11.4 © 3242-9065 Healthwise, [employer].  Care instructions adapted under license by [employer] (which disclaims liability or warranty for this information). If you have questions about a medical condition or this instruction, always ask your healthcare professional. [address] any warranty or liability for your use of this information. Introducing [facility]! [facility] introduces [employer] patient portal. Now you can access parts of your medical record, email your doctor's office, and request medication refills online. 1. In your internet browser, go to [email or web address] 2. Click on the First Time User? Click Here link in the Sign In box. You will see the New Member Sign Up page. 3. Enter your [employer] Access Code exactly as it appears below. You will not need to use this code after youve completed the sign-up process. If you do not sign up before the expiration date, you must request a new code. · [employer] Access Code: [email or web address] Expires: 7/16/2018  5:37 PM 
 
4. Enter the last four digits of your Social Security Number (xxxx) and Date of Birth (mm/dd/yyyy) as indicated and click Submit. You will be taken to the next sign-up page. 5. Create a [employer] ID. This will be your [employer] login ID and cannot be changed, so think of one that is secure and easy to remember. 6. Create a [employer] password. You can change your password at any time. 7. Enter your Password Reset Question and Answer. This can be used at a later time if you forget your password. 8. Enter your e-mail address. You will receive e-mail notification when new information is available in [address] 9. Click Sign Up. You can now view and download portions of your medical record. 10. Click the Download Summary menu link to download a portable copy of your medical information. If you have questions, please visit the Frequently Asked Questions section of the [employer] website. Remember, [employer] is NOT to be used for urgent needs. For medical emergencies, dial 911. Now available from your iPhone and Android! Please provide this summary of care documentation to your next provider. Your primary care clinician is listed as Ines Jarrett. If you have any questions after today's visit, please call [phone].

## 2018-05-22 NOTE — PROGRESS NOTES
Chief Complaint   Patient presents with    Follow Up Chronic Condition       Pt is a 60y.o. year old female who presents for follow up of her chronic medical problems    Health Maintenance Due   Topic Date Due    ZOSTER VACCINE AGE 60>  10/12/2014-Advised to get Shingrix at her pharmacy    PAP AKA CERVICAL CYTOLOGY  09/14/2018   Colonoscopy re printed from media    BMI 35  Wt Readings from Last 3 Encounters:   05/22/18 173 lb 9.6 oz (78.7 kg)   01/17/18 165 lb (74.8 kg)   10/26/17 168 lb (76.2 kg)   stressed so much      BONE DENSITY STUDY - CENTRAL6/29/2017  [facility]  Result Impression   Impression:    1.  BMD MEASURES IN THE RANGE OF OSTEOPENIA. Mammo due in June    Lab Results   Component Value Date/Time    Cholesterol, total 219 (H) 05/22/2018 10:01 AM    HDL Cholesterol 80 (H) 05/22/2018 10:01 AM    LDL, calculated 125 (H) 05/22/2018 10:01 AM    VLDL, calculated 14 05/22/2018 10:01 AM    Triglyceride 70 05/22/2018 10:01 AM    CHOL/HDL Ratio 2.8 04/14/2016 08:42 AM   fasting today    A1c was 5.8% at the weight loss clinic  Strong fam hx of DM    Shoulder pain is better after PT  Fractured left ankle-better also after PT    ROS:    Pt denies: Wt loss, Fever/Chills, HA, Visual changes, Fatigue, Chest pain, SOB, COPELAND, Abd pain, N/V/D/C, Blood in stool or urine, Edema. Pertinent positive as above in HPI. All others were negative    Patient Active Problem List   Diagnosis Code    Bursitis of shoulder, left M75.52    Subcutaneous mass-lt/ neck R22.9    Keratinous cyst-left neck L72.0    Primary osteoarthritis of left hip M16.12    Family history of CVA Z82.3    Obesity (BMI 30.0-34. 9) E66.9    Osteopenia M85.80    Hyperlipidemia E78.5    Pre-diabetes R73.03       Past Medical History:   Diagnosis Date    Arthritis     Diverticulitis        Current Outpatient Prescriptions   Medication Sig Dispense Refill    docosahexanoic acid/epa (FISH OIL PO) Take  by mouth.       cholecalciferol, vitamin D3, (VITAMIN D3 PO) Take  by mouth.  CALCIUM PO Take  by mouth.  cyanocobalamin, vitamin B-12, (VITAMIN B12 PO) Take  by mouth.  HYDROcodone-acetaminophen (NORCO) 5-325 mg per tablet Take 1 Tab by mouth every four (4) hours as needed for Pain. Max Daily Amount: 6 Tabs. 15 Tab 0       History   Smoking Status    Never Smoker   Smokeless Tobacco    Never Used       Allergies   Allergen Reactions    Latex Rash    Naprosyn [Naproxen] Itching    Strawberry Itching and Swelling     Per pt report       Patient Labs were reviewed: yes      Patient Past Records were reviewed:  yes        Objective:     Vitals:    05/22/18 0918   BP: 125/80   Pulse: 98   Resp: 17   Temp: 97.9 °F (36.6 °C)   TempSrc: Oral   SpO2: 96%   Weight: 173 lb 9.6 oz (78.7 kg)   Height: 4' 11\" (1.499 m)     Body mass index is 35.06 kg/(m^2). Exam:   Appearance: alert, well appearing,  oriented to person, place, and time, acyanotic, in no respiratory distress and well hydrated. HEENT:  NC/AT, pink conj, anicteric sclerae  Neck:  No cervical lymphadenopathy, no JVD, no thyromegaly, no carotid bruit  Heart:  RRR without M/R/G  Lungs:  CTAB, no rhonchi, rales, or wheezes with good air exchange   Abdomen:  Non-tender, pos bowel sounds, no hepatosplenomegaly  Ext:  No C/C/E    Skin: no rash  Neuro: no lateralizing signs, CNs II-XII intact      Assessment/ Plan:   Diagnoses and all orders for this visit:    1. Hyperlipidemia, unspecified hyperlipidemia type-low cholesterol diet as HDL is up  -     LIPID PANEL; Future  -     METABOLIC PANEL, COMPREHENSIVE; Future    2. Pre-diabetes-advised to decrease carbs in diet  -     HEMOGLOBIN A1C W/O EAG; Future    3. Osteopenia, unspecified location-Ca+d daily, walk for exercise    4. Obesity (BMI 30.0-34. 9)-discussed limiting álvaro to 6658-7208/day and exercising at least 150 min a week  -     TSH 3RD GENERATION; Future    5.  Screening for breast cancer-mammo due in June - [facility] BI SCREENING INCL CAD; Future        Follow-up Disposition:  Return in about 6 months (around 11/22/2018) for follow up, physical.        I have discussed the diagnosis with the patient and the intended plan as seen in the above orders. The patient has received an After-Visit Summary and questions were answered concerning future plans. Medication Side Effects and Warnings were discussed with patient: yes    Patient verbalized understanding of above instructions.     Diana Kendall MD  Internal Medicine  [facility]

## 2018-05-22 NOTE — PROGRESS NOTES
Chief Complaint   Patient presents with    Follow Up Chronic Condition     1. Have you been to the ER, urgent care clinic since your last visit? Hospitalized since your last visit? Yes When: 2/2018 Where: [facility] Reason for visit: foot injury    2. Have you seen or consulted any other health care providers outside of the [facility] since your last visit? Include any pap smears or colon screening.  No

## 2018-05-23 LAB
A-G RATIO,AGRAT: 1.4 RATIO (ref 1.1–2.6)
ALBUMIN SERPL-MCNC: 4.2 G/DL (ref 3.5–5)
ALP SERPL-CCNC: 73 U/L (ref 40–120)
ALT SERPL-CCNC: 21 U/L (ref 5–40)
ANION GAP SERPL CALC-SCNC: 16 MMOL/L
AST SERPL W P-5'-P-CCNC: 17 U/L (ref 10–37)
AVG GLU, 10930: 124 MG/DL (ref 91–123)
BILIRUB SERPL-MCNC: 0.3 MG/DL (ref 0.2–1.2)
BUN SERPL-MCNC: 11 MG/DL (ref 6–22)
CALCIUM SERPL-MCNC: 9.2 MG/DL (ref 8.4–10.5)
CHLORIDE SERPL-SCNC: 103 MMOL/L (ref 98–110)
CHOLEST SERPL-MCNC: 219 MG/DL (ref 110–200)
CO2 SERPL-SCNC: 25 MMOL/L (ref 20–32)
CREAT SERPL-MCNC: 0.8 MG/DL (ref 0.8–1.4)
GFRAA, 66117: >60
GFRNA, 66118: >60
GLOBULIN,GLOB: 2.9 G/DL (ref 2–4)
GLUCOSE SERPL-MCNC: 97 MG/DL (ref 70–99)
HBA1C MFR BLD HPLC: 5.9 % (ref 4.8–5.9)
HDLC SERPL-MCNC: 2.7 MG/DL (ref 0–5)
HDLC SERPL-MCNC: 80 MG/DL (ref 40–59)
LDLC SERPL CALC-MCNC: 125 MG/DL (ref 50–99)
POTASSIUM SERPL-SCNC: 4.3 MMOL/L (ref 3.5–5.5)
PROT SERPL-MCNC: 7.1 G/DL (ref 6.2–8.1)
SODIUM SERPL-SCNC: 144 MMOL/L (ref 133–145)
TRIGL SERPL-MCNC: 70 MG/DL (ref 40–149)
TSH SERPL DL<=0.005 MIU/L-ACNC: 0.79 MCU/ML (ref 0.27–4.2)
VLDLC SERPL CALC-MCNC: 14 MG/DL (ref 8–30)

## 2018-05-24 NOTE — PROGRESS NOTES
pls let patient know cholesterol went up a bit but HDL or good cholesterol is high so just continue to watch diet  Thyroid test normal   blood sugar normal

## 2018-05-28 PROBLEM — E78.5 HYPERLIPIDEMIA: Status: ACTIVE | Noted: 2018-05-28

## 2018-05-28 PROBLEM — R73.03 PRE-DIABETES: Status: ACTIVE | Noted: 2018-05-28

## 2018-07-17 DIAGNOSIS — Z12.39 SCREENING FOR BREAST CANCER: ICD-10-CM

## 2018-10-30 ENCOUNTER — OFFICE VISIT (OUTPATIENT)
Dept: FAMILY MEDICINE CLINIC | Age: 63
End: 2018-10-30

## 2018-10-30 VITALS
HEART RATE: 77 BPM | BODY MASS INDEX: 35 KG/M2 | HEIGHT: 59 IN | RESPIRATION RATE: 15 BRPM | SYSTOLIC BLOOD PRESSURE: 130 MMHG | OXYGEN SATURATION: 96 % | DIASTOLIC BLOOD PRESSURE: 80 MMHG | TEMPERATURE: 96.8 F | WEIGHT: 173.6 LBS

## 2018-10-30 DIAGNOSIS — R73.03 PRE-DIABETES: ICD-10-CM

## 2018-10-30 DIAGNOSIS — H66.92 ACUTE OTITIS MEDIA, LEFT: ICD-10-CM

## 2018-10-30 DIAGNOSIS — Z00.00 WELL WOMAN EXAM (NO GYNECOLOGICAL EXAM): Primary | ICD-10-CM

## 2018-10-30 DIAGNOSIS — E78.5 HYPERLIPIDEMIA, UNSPECIFIED HYPERLIPIDEMIA TYPE: ICD-10-CM

## 2018-10-30 DIAGNOSIS — E66.01 SEVERE OBESITY (HCC): ICD-10-CM

## 2018-10-30 RX ORDER — AZITHROMYCIN 250 MG/1
TABLET, FILM COATED ORAL
Qty: 6 TAB | Refills: 0 | Status: SHIPPED | OUTPATIENT
Start: 2018-10-30 | End: 2018-11-04

## 2018-10-30 NOTE — PROGRESS NOTES
Chief Complaint   Patient presents with    Complete Physical     Patient is fasting    Cold Symptoms     x5 days, nasal drainage, ears       Pt is a 60y.o. year old female who presents for follow up of her chronic medical problems  BONE DENSITY STUDY - CENTRAL6/29/2017  [facility]  Result Impression   Impression:    1.  BMD MEASURES IN THE RANGE OF OSTEOPENIA. 2.  COMPARED TO THE PRECEDING STUDY, BMD  IS WITHOUT STATISTICALLY SIGNIFICANT INTERVAL CHANGE. 3.  COMPARED TO BASELINE STUDY, BMD HAS INCREASED. MAMMO-DIGITAL SCREENING BILAT W/ TOMOSYNTHESIS7/2/2018  [facility]  Result Impression   Impression:    No evidence of malignancy.      Recommend clinical followup and  yearly screening mammograms    BIRADS - BI-RADS 1 - Negative        Right side of the face feels tight -comes and goes, chronic; was eeen at [facility] [facility] in [facility]-brain imaging normal  Worried bec mom had stroke    BP Readings from Last 3 Encounters:   10/30/18 141/87   05/22/18 125/80   01/17/18 99/62   strong family hx of HTN, CAD and DM  Repeat BP-    Lab Results   Component Value Date/Time    Cholesterol, total 225 (H) 10/30/2018 10:40 AM    HDL Cholesterol 66 (H) 10/30/2018 10:40 AM    LDL, calculated 145 (H) 10/30/2018 10:40 AM    VLDL, calculated 14 10/30/2018 10:40 AM    Triglyceride 69 10/30/2018 10:40 AM    CHOL/HDL Ratio 2.8 04/14/2016 08:42 AM       Lab Results   Component Value Date/Time    Hemoglobin A1c 5.8 10/30/2018 10:40 AM       Cold sxs getting better, both ears and throat still bother her  No fever  Took some OTC meds    Wt Readings from Last 3 Encounters:   10/30/18 173 lb 9.6 oz (78.7 kg)   05/22/18 173 lb 9.6 oz (78.7 kg)   01/17/18 165 lb (74.8 kg)       Health Maintenance Due   Topic Date Due    Shingrix Vaccine Age 50> (1 of 2) 12/12/2004    Influenza Age 5 to Adult  08/01/2018    PAP AKA CERVICAL CYTOLOGY -wants it done another time 09/14/2018    MEDICARE YEARLY EXAM  10/29/2018 ROS:    Pt denies: Wt loss, Fever/Chills, HA, Visual changes, Fatigue, Chest pain, SOB, COPELAND, Abd pain, N/V/D/C, Blood in stool or urine, Edema. Pertinent positive as above in HPI. All others were negative    Patient Active Problem List   Diagnosis Code    Bursitis of shoulder, left M75.52    Subcutaneous mass-lt/ neck R22.9    Keratinous cyst-left neck L72.0    Primary osteoarthritis of left hip M16.12    Family history of CVA Z82.3    Osteopenia M85.80    Hyperlipidemia E78.5    Pre-diabetes R73.03    Severe obesity ([facility] [address] E66.01       Past Medical History:   Diagnosis Date    Arthritis     Diverticulitis        Current Outpatient Medications   Medication Sig Dispense Refill    ubidecarenone (CO Q-10 PO) Take  by mouth.  docosahexanoic acid/epa (FISH OIL PO) Take  by mouth.  cholecalciferol, vitamin D3, (VITAMIN D3 PO) Take  by mouth.  CALCIUM PO Take  by mouth.  cyanocobalamin, vitamin B-12, (VITAMIN B12 PO) Take  by mouth.  HYDROcodone-acetaminophen (NORCO) 5-325 mg per tablet Take 1 Tab by mouth every four (4) hours as needed for Pain. Max Daily Amount: 6 Tabs. 15 Tab 0       Social History     Tobacco Use   Smoking Status Never Smoker   Smokeless Tobacco Never Used       Allergies   Allergen Reactions    Latex Rash    Naprosyn [Naproxen] Itching    Strawberry Itching and Swelling     Per pt report       Patient Labs were reviewed: yes      Patient Past Records were reviewed:  yes        Objective:     Vitals:    10/30/18 0930 10/30/18 1029   BP: 141/87 130/80   Pulse: 77    Resp: 15    Temp: 96.8 °F (36 °C)    TempSrc: Oral    SpO2: 96%    Weight: 173 lb 9.6 oz (78.7 kg)    Height: 4' 11\" (1.499 m)      Body mass index is 35.06 kg/m². Exam:   Appearance: alert, well appearing,  oriented to person, place, and time, acyanotic, in no respiratory distress and well hydrated.   HEENT:  NC/AT, pink conj, anicteric sclerae, left TM red and bulging; nasal congestion noted  Neck:  No cervical lymphadenopathy, no JVD, no thyromegaly, no carotid bruit  Heart:  RRR without M/R/G  Lungs:  CTAB, no rhonchi, rales, or wheezes with good air exchange   Abdomen:  Non-tender, pos bowel sounds, no hepatosplenomegaly  Ext:  No C/C/E    Skin: no rash  Neuro: no lateralizing signs, CNs II-XII intact      Assessment/ Plan:   Diagnoses and all orders for this visit:    1. Well woman exam (no gynecological exam)-Advised re: monthly self breast exam, dental prophylaxis Q 6 months, regular exercise, yearly eye exam, daily intake of Ca+D    2. Hyperlipidemia, unspecified hyperlipidemia type-low cholesterol diet advised  -     LIPID PANEL; Future    3. Severe obesity (HCC)-discussed limiting álvaro to 6713-8220/day and exercising at least 150 min a week    4. Pre-diabetes-weight loss imperative  -     HEMOGLOBIN A1C W/O EAG; Future    5. Acute otitis media, left  -     azithromycin (ZITHROMAX) 250 mg tablet; Take 2 tablets today, then take 1 tablet daily        Follow-up Disposition:  Return in about 6 months (around 4/30/2019) for pap smear. I have discussed the diagnosis with the patient and the intended plan as seen in the above orders. The patient has received an After-Visit Summary and questions were answered concerning future plans. Medication Side Effects and Warnings were discussed with patient: yes    Patient verbalized understanding of above instructions.     Royal Kalin MD  Internal Medicine  [facility]

## 2018-10-30 NOTE — LETTER
11/1/2018 7:52 AM 
 
Ms. Yamel [phone] Dear Yamel Casey: 
 
Please find your most recent results below. Resulted Orders LIPID PANEL Result Value Ref Range Triglyceride 69 40 - 149 mg/dL HDL Cholesterol 66 (H) 40 - 59 mg/dL Cholesterol, total 225 (H) 110 - 200 mg/dL CHOLESTEROL/HDL 3.4 0.0 - 5.0 LDL, calculated 145 (H) 50 - 99 mg/dL VLDL, calculated 14 8 - 30 mg/dL Comment:  
   Test includes cholesterol, HDL cholesterol, triglycerides and LDL. Cholesterol Recommended NCEP guidelines in mg/dL: 
Less than 200            Desirable 200 - 239                Borderline High Greater than or  = 240   High Please Note: Total Chol/HDL Ratio Men     Women 1/2 Avg. Risk    3.4     3.3 Avg. Risk    5.0     4.4 
2X  Avg. Risk    9.6     7.1 3X  Avg. Risk   23.4    11.0 Narrative Unless additionally indicated, test performed at: [facility], [address] [place] [address]. PH: [phone]. HEMOGLOBIN A1C W/O EAG Result Value Ref Range Hemoglobin A1c 5.8 4.8 - 5.9 % AVG  91 - 123 mg/dL Narrative Unless additionally indicated, test performed at: "[facility] [address] [place] [address], [address] [place] [address]. PH: [phone]. RECOMMENDATIONS: 
Blood sugar is normal-no diabetes 
cholesterol went up a bit more but your HDL or good cholesterol is still high so continue to watch your diet; no need for medication at this time. Please call me if you have any questions: [phone] Sincerely, Мария Mancuso MD

## 2018-10-30 NOTE — PATIENT INSTRUCTIONS
Well Visit, Women 48 to 72: Care Instructions  Your Care Instructions    Physical exams can help you stay healthy. Your doctor has checked your overall health and may have suggested ways to take good care of yourself. He or she also may have recommended tests. At home, you can help prevent illness with healthy eating, regular exercise, and other steps. Follow-up care is a key part of your treatment and safety. Be sure to make and go to all appointments, and call your doctor if you are having problems. It's also a good idea to know your test results and keep a list of the medicines you take. How can you care for yourself at home? · Reach and stay at a healthy weight. This will lower your risk for many problems, such as obesity, diabetes, heart disease, and high blood pressure. · Get at least 30 minutes of exercise on most days of the week. Walking is a good choice. You also may want to do other activities, such as running, swimming, cycling, or playing tennis or team sports. · Do not smoke. Smoking can make health problems worse. If you need help quitting, talk to your doctor about stop-smoking programs and medicines. These can increase your chances of quitting for good. · Protect your skin from too much sun. When you're outdoors from 10 a.m. to 4 p.m., stay in the shade or cover up with clothing and a hat with a wide brim. Wear sunglasses that block UV rays. Even when it's cloudy, put broad-spectrum sunscreen (SPF 30 or higher) on any exposed skin. · See a dentist one or two times a year for checkups and to have your teeth cleaned. · Wear a seat belt in the car. · Limit alcohol to 1 drink a day. Too much alcohol can cause health problems. Follow your doctor's advice about when to have certain tests. These tests can spot problems early. · Cholesterol.  Your doctor will tell you how often to have this done based on your age, family history, or other things that can increase your risk for heart attack and stroke. · Blood pressure. Have your blood pressure checked during a routine doctor visit. Your doctor will tell you how often to check your blood pressure based on your age, your blood pressure results, and other factors. · Mammogram. Ask your doctor how often you should have a mammogram, which is an X-ray of your breasts. A mammogram can spot breast cancer before it can be felt and when it is easiest to treat. · Pap test and pelvic exam. Ask your doctor how often you should have a Pap test. You may not need to have a Pap test as often as you used to. · Vision. Have your eyes checked every year or two or as often as your doctor suggests. Some experts recommend that you have yearly exams for glaucoma and other age-related eye problems starting at age 48. · Hearing. Tell your doctor if you notice any change in your hearing. You can have tests to find out how well you hear. · Diabetes. Ask your doctor whether you should have tests for diabetes. · Colon cancer. You should begin tests for colon cancer at age 48. You may have one of several tests. Your doctor will tell you how often to have tests based on your age and risk. Risks include whether you already had a precancerous polyp removed from your colon or whether your parents, sisters and brothers, or children have had colon cancer. · Thyroid disease. Talk to your doctor about whether to have your thyroid checked as part of a regular physical exam. Women have an increased chance of a thyroid problem. · Osteoporosis. You should begin tests for bone density at age 72. If you are younger than 72, ask your doctor whether you have factors that may increase your risk for this disease. You may want to have this test before age 72. · Heart attack and stroke risk. At least every 4 to 6 years, you should have your risk for heart attack and stroke assessed.  Your doctor uses factors such as your age, blood pressure, cholesterol, and whether you smoke or have diabetes to show what your risk for a heart attack or stroke is over the next 10 years. When should you call for help? Watch closely for changes in your health, and be sure to contact your doctor if you have any problems or symptoms that concern you. Where can you learn more? Go to http://ada-korina.info/. Enter F650 in the search box to learn more about \"Well Visit, Women 50 to 72: Care Instructions. \"  Current as of: March 29, 2018  Content Version: 11.8  © 4596-2978 [employer]. Care instructions adapted under license by [employer] (which disclaims liability or warranty for this information). If you have questions about a medical condition or this instruction, always ask your healthcare professional. Kathleen Hughestron any warranty or liability for your use of this information.

## 2018-10-30 NOTE — PROGRESS NOTES
Chief Complaint   Patient presents with    Complete Physical     Patient is fasting    Cold Symptoms     x5 days, nasal drainage, ears         1. Have you been to the ER, urgent care clinic since your last visit? Hospitalized since your last visit? No    2. Have you seen or consulted any other health care providers outside of the [address] [place] [address] since your last visit? Include any pap smears or colon screening.  No

## 2018-10-31 LAB
AVG GLU, 10930: 119 MG/DL (ref 91–123)
CHOLEST SERPL-MCNC: 225 MG/DL (ref 110–200)
HBA1C MFR BLD HPLC: 5.8 % (ref 4.8–5.9)
HDLC SERPL-MCNC: 3.4 MG/DL (ref 0–5)
HDLC SERPL-MCNC: 66 MG/DL (ref 40–59)
LDLC SERPL CALC-MCNC: 145 MG/DL (ref 50–99)
TRIGL SERPL-MCNC: 69 MG/DL (ref 40–149)
VLDLC SERPL CALC-MCNC: 14 MG/DL (ref 8–30)

## 2018-12-27 ENCOUNTER — OFFICE VISIT (OUTPATIENT)
Dept: FAMILY MEDICINE CLINIC | Age: 63
End: 2018-12-27

## 2018-12-27 VITALS
TEMPERATURE: 100.5 F | SYSTOLIC BLOOD PRESSURE: 99 MMHG | HEART RATE: 104 BPM | WEIGHT: 174 LBS | DIASTOLIC BLOOD PRESSURE: 73 MMHG | OXYGEN SATURATION: 88 % | BODY MASS INDEX: 35.08 KG/M2 | RESPIRATION RATE: 22 BRPM | HEIGHT: 59 IN

## 2018-12-27 DIAGNOSIS — R50.81 FEVER IN OTHER DISEASES: Primary | ICD-10-CM

## 2018-12-27 DIAGNOSIS — R05.9 COUGH: ICD-10-CM

## 2018-12-27 DIAGNOSIS — J06.9 URI, ACUTE: ICD-10-CM

## 2018-12-27 DIAGNOSIS — J02.9 SORE THROAT: ICD-10-CM

## 2018-12-27 LAB
BILIRUB UR QL STRIP: NEGATIVE
GLUCOSE UR-MCNC: NEGATIVE MG/DL
KETONES P FAST UR STRIP-MCNC: NEGATIVE MG/DL
PH UR STRIP: 7 [PH] (ref 4.6–8)
PROT UR QL STRIP: NEGATIVE
S PYO AG THROAT QL: NEGATIVE
SP GR UR STRIP: 1.01 (ref 1–1.03)
UA UROBILINOGEN AMB POC: NORMAL (ref 0.2–1)
URINALYSIS CLARITY POC: CLEAR
URINALYSIS COLOR POC: YELLOW
URINE BLOOD POC: NORMAL
URINE LEUKOCYTES POC: NEGATIVE
URINE NITRITES POC: NEGATIVE
VALID INTERNAL CONTROL?: YES

## 2018-12-27 RX ORDER — HYDROCODONE POLISTIREX AND CHLORPHENIRAMINE POLISTIREX 10; 8 MG/5ML; MG/5ML
5 SUSPENSION, EXTENDED RELEASE ORAL
Qty: 120 ML | Refills: 0 | Status: SHIPPED | OUTPATIENT
Start: 2018-12-27 | End: 2019-02-04

## 2018-12-27 RX ORDER — CEFDINIR 300 MG/1
300 CAPSULE ORAL 2 TIMES DAILY
Qty: 20 CAP | Refills: 0 | Status: SHIPPED | OUTPATIENT
Start: 2018-12-27 | End: 2019-01-06

## 2018-12-27 RX ORDER — OSELTAMIVIR PHOSPHATE 75 MG/1
CAPSULE ORAL
COMMUNITY
End: 2019-02-04

## 2018-12-27 NOTE — PROGRESS NOTES
Chief Complaint   Patient presents with    Fever    Cold Symptoms    Sore Throat    Generalized Body Aches       HPI:    This is a 57 y/o female  patient who presents for the above symptoms. + cough, sore throat, body ache, fever. No chest pain or SOB    She was seen for this at urgent care on 12/22/18. She was treated with Tamiflu. - no symptomatic relief still having fever with SOB. Results for orders placed or performed in visit on 12/27/18   AMB POC URINALYSIS DIP STICK AUTO W/O MICRO   Result Value Ref Range    Color (UA POC) Yellow     Clarity (UA POC) Clear     Glucose (UA POC) Negative Negative    Bilirubin (UA POC) Negative Negative    Ketones (UA POC) Negative Negative    Specific gravity (UA POC) 1.010 1.001 - 1.035    Blood (UA POC) Trace Negative    pH (UA POC) 7.0 4.6 - 8.0    Protein (UA POC) Negative Negative    Urobilinogen (UA POC) 0.2 mg/dL 0.2 - 1    Nitrites (UA POC) Negative Negative    Leukocyte esterase (UA POC) Negative Negative   AMB POC RAPID STREP A   Result Value Ref Range    VALID INTERNAL CONTROL POC Yes     Group A Strep Ag Negative Negative         ROS: pertinent positives as noted in HPI. All others were negative. Past Medical History:   Diagnosis Date    Arthritis     Diverticulitis        History reviewed. No pertinent surgical history. Social History     Socioeconomic History    Marital status: SINGLE     Spouse name: Not on file    Number of children: Not on file    Years of education: Not on file    Highest education level: Not on file   Social Needs    Financial resource strain: Not on file    Food insecurity - worry: Not on file    Food insecurity - inability: Not on file    Transportation needs - medical: Not on file   [place] needs - non-medical: Not on file   Occupational History    Not on file   Tobacco Use    Smoking status: Never Smoker    Smokeless tobacco: Never Used   Substance and Sexual Activity    Alcohol use:  No Alcohol/week: 0.0 oz    Drug use: No    Sexual activity: Not Currently   Other Topics Concern    Not on file   Social History Narrative    Not on file     Current Outpatient Medications   Medication Sig Dispense Refill    guaifenesin/dextromethorphan (COUGH CONTROL DM PO) Take  by mouth.  acetaminophen (TYLENOL EXTRA STRENGTH PO) Take  by mouth.  oseltamivir (TAMIFLU) 75 mg capsule Take  by mouth.  ubidecarenone (CO Q-10 PO) Take  by mouth.  docosahexanoic acid/epa (FISH OIL PO) Take  by mouth.  cholecalciferol, vitamin D3, (VITAMIN D3 PO) Take  by mouth.  CALCIUM PO Take  by mouth.  cyanocobalamin, vitamin B-12, (VITAMIN B12 PO) Take  by mouth.  HYDROcodone-acetaminophen (NORCO) 5-325 mg per tablet Take 1 Tab by mouth every four (4) hours as needed for Pain. Max Daily Amount: 6 Tabs.  15 Tab 0     Allergies   Allergen Reactions    Latex Rash    Naprosyn [Naproxen] Itching    Strawberry Itching and Swelling     Per pt report       Physical Exam:    Vital Signs:   Visit Vitals  BP 99/73   Pulse (!) 104   Temp (!) 100.5 °F (38.1 °C) (Oral)   Resp 22   Ht 4' 11\" (1.499 m)   Wt 174 lb (78.9 kg)   SpO2 (!) 88%   BMI 35.14 kg/m²             General: a, a & o x 3, afebrile, interacting appropriately, in no acute distress  HEENT: head normocephalic and atraumatic, conjuctiva clear  Skin: color race-appropriate, warm and dry, no rashes , no bruises  Neck: supple, symmetrical, no palpable mass, no thyromegaly  Respiratory: symmetrical chest expansion, lung sounds clear,  no wheezes or crackles  Cardiovascular: normal S1S2, regular rate and rhythm, no murmurs  Abdomen: non-distended, normal bowel sounds x 4 quadrants, soft, non-tender to palpation  Musculoskeletal: normal ROM on all joints, no swelling or deformity  Lymphatic: no cervical lymphadenopathy   Psychological: a, a & o x 3, appropriate mood and affect, no thought disorder              Assessment/Plan:      ICD-10-CM ICD-9-CM    1. Fever in other diseases R50.81 780.61 AMB POC URINALYSIS DIP STICK AUTO W/O MICRO- negative   2. URI, acute J06.9 465.9    3. Sore throat J02.9 462 AMB POC RAPID STREP A- negative   4. Cough R05 786.2 XR CHEST PA LAT      chlorpheniramine-HYDROcodone (TUSSIONEX) 10-8 mg/5 mL suspension      cefdinir (OMNICEF) 300 mg capsule         Additional Notes: Discussed today's diagnosis, treatment plans. Discussed medication indications and side effects. After Visit Summary: Provided and discussed printed patient instructions. Answered questions in relation to today's diagnosis.   Follow-up Disposition: f/u as needed if symptoms fail to improve or worsen            Carson Rodriguez NP-BC  Family Medicine  [facility]          Orders Placed This Encounter    XR CHEST PA LAT    AMB POC URINALYSIS DIP STICK AUTO W/O MICRO    AMB POC RAPID STREP A    guaifenesin/dextromethorphan (COUGH CONTROL DM PO)    acetaminophen (TYLENOL EXTRA STRENGTH PO)    oseltamivir (TAMIFLU) 75 mg capsule    chlorpheniramine-HYDROcodone (TUSSIONEX) 10-8 mg/5 mL suspension    cefdinir (OMNICEF) 300 mg capsule                 Orders Placed This Encounter    guaifenesin/dextromethorphan (COUGH CONTROL DM PO)    acetaminophen (TYLENOL EXTRA STRENGTH PO)    oseltamivir (TAMIFLU) 75 mg capsule

## 2018-12-27 NOTE — PROGRESS NOTES
Chief Complaint   Patient presents with    Fever    Cold Symptoms    Sore Throat    Generalized Body Aches     1. Have you been to the ER, urgent care clinic since your last visit? Hospitalized since your last visit? Urgent Care on 12/24/2018. Patient was tested for flu at Urgent Care, however the results were negative for flu virus. Patient was given Oseltamivir for treatment of flu-like symptoms. 2. Have you seen or consulted any other health care providers outside of the [facility] since your last visit? Include any pap smears or colon screening.  No.    Visit Vitals  BP 99/73   Pulse (!) 104   Temp (!) 100.5 °F (38.1 °C) (Oral)   Resp 22   Ht 4' 11\" (1.499 m)   Wt 174 lb (78.9 kg)   SpO2 (!) 88%   BMI 35.14 kg/m²

## 2018-12-27 NOTE — LETTER
NOTIFICATION RETURN TO WORK / SCHOOL 
 
12/27/2018 10:24 AM 
 
Ms. Elizabeth Van [phone] To Whom It May Concern: 
 
Elizabeth Van is currently under the care of [facility]. She will return to work on January 2, 2019. If there are questions or concerns please have the patient contact our office. Sincerely, Casey Roberto NP

## 2019-02-04 ENCOUNTER — OFFICE VISIT (OUTPATIENT)
Dept: FAMILY MEDICINE CLINIC | Age: 64
End: 2019-02-04

## 2019-02-04 VITALS
HEART RATE: 84 BPM | DIASTOLIC BLOOD PRESSURE: 88 MMHG | HEIGHT: 59 IN | TEMPERATURE: 96.8 F | WEIGHT: 172 LBS | BODY MASS INDEX: 34.68 KG/M2 | SYSTOLIC BLOOD PRESSURE: 138 MMHG

## 2019-02-04 DIAGNOSIS — E66.01 SEVERE OBESITY (HCC): ICD-10-CM

## 2019-02-04 DIAGNOSIS — E78.5 HYPERLIPIDEMIA, UNSPECIFIED HYPERLIPIDEMIA TYPE: Primary | ICD-10-CM

## 2019-02-04 DIAGNOSIS — K63.5 HYPERPLASTIC COLONIC POLYP, UNSPECIFIED PART OF COLON: ICD-10-CM

## 2019-02-04 DIAGNOSIS — R05.9 COUGH: ICD-10-CM

## 2019-02-04 DIAGNOSIS — M85.80 OSTEOPENIA, UNSPECIFIED LOCATION: ICD-10-CM

## 2019-02-04 NOTE — PATIENT INSTRUCTIONS
Starting a Weight Loss Plan: Care Instructions Your Care Instructions If you are thinking about losing weight, it can be hard to know where to start. Your doctor can help you set up a weight loss plan that best meets your needs. You may want to take a class on nutrition or exercise, or join a weight loss support group. If you have questions about how to make changes to your eating or exercise habits, ask your doctor about seeing a registered dietitian or an exercise specialist. 
It can be a big challenge to lose weight. But you do not have to make huge changes at once. Make small changes, and stick with them. When those changes become habit, add a few more changes. If you do not think you are ready to make changes right now, try to pick a date in the future. Make an appointment to see your doctor to discuss whether the time is right for you to start a plan. Follow-up care is a key part of your treatment and safety. Be sure to make and go to all appointments, and call your doctor if you are having problems. It's also a good idea to know your test results and keep a list of the medicines you take. How can you care for yourself at home? · Set realistic goals. Many people expect to lose much more weight than is likely. A weight loss of 5% to 10% of your body weight may be enough to improve your health. · Get family and friends involved to provide support. Talk to them about why you are trying to lose weight, and ask them to help. They can help by participating in exercise and having meals with you, even if they may be eating something different. · Find what works best for you. If you do not have time or do not like to cook, a program that offers meal replacement bars or shakes may be better for you. Or if you like to prepare meals, finding a plan that includes daily menus and recipes may be best. 
· Ask your doctor about other health professionals who can help you achieve your weight loss goals. ? A dietitian can help you make healthy changes in your diet. ? An exercise specialist or  can help you develop a safe and effective exercise program. 
? A counselor or psychiatrist can help you cope with issues such as depression, anxiety, or family problems that can make it hard to focus on weight loss. · Consider joining a support group for people who are trying to lose weight. Your doctor can suggest groups in your area. Where can you learn more? Go to http://ada-korina.info/. Enter D207 in the search box to learn more about \"Starting a Weight Loss Plan: Care Instructions. \" Current as of: June 25, 2018 Content Version: 11.9 © 2388-6462 [employer]. Care instructions adapted under license by [employer] (which disclaims liability or warranty for this information). If you have questions about a medical condition or this instruction, always ask your healthcare professional. [address] any warranty or liability for your use of this information.

## 2019-02-04 NOTE — PROGRESS NOTES
1. Have you been to the ER, urgent care clinic since your last visit? Hospitalized since your last visit? yes January 3 2019 [facility] [place] [id number] for cough 2. Have you seen or consulted any other health care providers outside of the [address] [place] [address] since your last visit? Include any pap smears or colon screening.  No

## 2019-02-04 NOTE — PROGRESS NOTES
Chief Complaint Patient presents with  Follow Up Chronic Condition 6 month Pt is a 58y.o. year old female who presents for follow up of her chronic medical problems Health Maintenance Due Topic Date Due  Shingrix Vaccine Age 50> (1 of 2) 12/12/2004  Influenza Age 5 to Adult  08/01/2018  PAP AKA CERVICAL CYTOLOGY  09/14/2018  
declined PAP today/not  Sexually active for over 13 yrs Wt Readings from Last 3 Encounters:  
02/04/19 172 lb (78 kg) 12/27/18 174 lb (78.9 kg) 10/30/18 173 lb 9.6 oz (78.7 kg) Lab Results Component Value Date/Time Cholesterol, total 225 (H) 10/30/2018 10:40 AM  
 HDL Cholesterol 66 (H) 10/30/2018 10:40 AM  
 LDL, calculated 145 (H) 10/30/2018 10:40 AM  
 VLDL, calculated 14 10/30/2018 10:40 AM  
 Triglyceride 69 10/30/2018 10:40 AM  
 CHOL/HDL Ratio 2.8 04/14/2016 08:42 AM  
Declines meds BONE DENSITY STUDY - CENTRAL6/29/2017 [employer] Result Impression Impression: 1.  BMD MEASURES IN THE RANGE OF OSTEOPENIA. Had a bad cough for 1 month-[facility]  2x Tested neg for the flu Better now Had CXR-neg Declined flu shot Hyperplastic polyp in 2012-due in 2022 ROS: 
 
Pt denies: Wt loss, Fever/Chills, HA, Visual changes, Fatigue, Chest pain, SOB, COP[place], Abd pain, N/V/D/C, Blood in stool or urine, Edema. Pertinent positive as above in HPI. All others were negative Patient Active Problem List  
Diagnosis Code  Bursitis of shoulder, left M75.52  
 Subcutaneous mass-lt/ neck R22.9  Keratinous cyst-left neck L72.0  
 Primary osteoarthritis of left hip M16.12  
 Family history of CVA Z82.3  
 Osteopenia M85.80  Hyperlipidemia E78.5  Pre-diabetes R73.03  
 Severe obesity (HCC) E66.01 Past Medical History:  
Diagnosis Date  Arthritis  Diverticulitis Current Outpatient Medications Medication Sig Dispense Refill  ubidecarenone (CO Q-10 PO) Take  by mouth.  docosahexanoic acid/epa (FISH OIL PO) Take  by mouth.  cholecalciferol, vitamin D3, (VITAMIN D3 PO) Take  by mouth.  CALCIUM PO Take  by mouth.  cyanocobalamin, vitamin B-12, (VITAMIN B12 PO) Take  by mouth. Social History Tobacco Use Smoking Status Never Smoker Smokeless Tobacco Never Used Allergies Allergen Reactions  Latex Rash  Naprosyn [Naproxen] Itching  Strawberry Itching and Swelling Per pt report Patient Labs were reviewed: yes Patient Past Records were reviewed:  yes Objective:  
 
Vitals:  
 02/04/19 1004 BP: 138/88 Pulse: 84 Temp: 96.8 °F (36 °C) TempSrc: Oral  
Weight: 172 lb (78 kg) Height: 4' 11\" (1.499 m) Body mass index is 34.74 kg/m². Exam:  
Appearance: alert, well appearing,  oriented to person, place, and time, acyanotic, in no respiratory distress and well hydrated. HEENT:  NC/AT, pink conj, anicteric sclerae Neck:  No cervical lymphadenopathy, no JVD, no thyromegaly, no carotid bruit Heart:  RRR without M/R/G Lungs:  CTAB, no rhonchi, rales, or wheezes with good air exchange Abdomen:  Non-tender, pos bowel sounds, no hepatosplenomegaly Ext:  No C/C/E Skin: no rash Neuro: no lateralizing signs, CNs II-XII intact Assessment/ Plan:  
Diagnoses and all orders for this visit: 
 
1. Hyperlipidemia, unspecified hyperlipidemia type-low cholesterol diet discussed 2. Osteopenia, unspecified location-continue with Ca+d daily, walk for exercise 3. Severe obesity (HCC)-discussed limiting álvaro to [id number]/day and exercising at least 150 min a week 4. Cough-resolving/seen at [facility]; declined to get flu shot 5. Hyperplastic colonic polyp, unspecified part of colon-advised not due for colonoscopy until 2022 Follow-up Disposition: 
Return in about 4 months (around 6/4/2019) for follow up.  
 
 
 
I have discussed the diagnosis with the patient and the intended plan as seen in the above orders. The patient has received an After-Visit Summary and questions were answered concerning future plans. Medication Side Effects and Warnings were discussed with patient: yes Patient verbalized understanding of above instructions. Jamar Caceres MD 
Internal Medicine [facility]

## 2019-05-28 PROBLEM — R20.2 NUMBNESS AND TINGLING OF RIGHT FACE: Status: ACTIVE | Noted: 2019-05-28

## 2019-05-28 PROBLEM — R20.0 NUMBNESS AND TINGLING OF RIGHT FACE: Status: ACTIVE | Noted: 2019-05-28

## 2019-06-07 ENCOUNTER — OFFICE VISIT (OUTPATIENT)
Dept: FAMILY MEDICINE CLINIC | Age: 64
End: 2019-06-07

## 2019-06-07 VITALS
TEMPERATURE: 97 F | SYSTOLIC BLOOD PRESSURE: 142 MMHG | RESPIRATION RATE: 16 BRPM | OXYGEN SATURATION: 96 % | HEART RATE: 87 BPM | WEIGHT: 161 LBS | DIASTOLIC BLOOD PRESSURE: 91 MMHG | HEIGHT: 59 IN | BODY MASS INDEX: 32.46 KG/M2

## 2019-06-07 DIAGNOSIS — R20.2 NUMBNESS AND TINGLING OF RIGHT FACE: Primary | ICD-10-CM

## 2019-06-07 DIAGNOSIS — R20.0 NUMBNESS AND TINGLING OF RIGHT FACE: Primary | ICD-10-CM

## 2019-06-07 NOTE — PROGRESS NOTES
Chief Complaint   Patient presents with    Transitions Of Care     Admitted to [facility]     1. Have you been to the ER, urgent care clinic since your last visit? Hospitalized since your last visit? Yes, [facility]    2. Have you seen or consulted any other health care providers outside of the [address] [place] [address] since your last visit? Include any pap smears or colon screening.  No

## 2019-06-07 NOTE — PROGRESS NOTES
[address] [place] [address]               [phone]      Pt is a 58y.o. year old female who presents for transition of care. Hospitalization summary;  Copied from hospital chart:  Kenia is a 58 y.o. BLACK OR  female who presents with numbness on the right side of the face, heaviness of the leg  Patient said she was fine on 11 PM on Sunday night woke up on Monday morning with the above complaints  She has not had any falls or head or neck pain  She said she has had right-sided facial droop for which she went to [facility] many years ago and was  sent home after getting a CT scan   Patient was admitted to telemetry unit. Admitted for stroke work-up. CT of the head was negative. She also had MRI of the brain which is unremarkable. Echo and carotid PVL were also completed and those are unremarkable too. Continued with aspirin 81 mg and low-dose statin. Okay to discharge home today. Test results:  Carotid PVL's  Interpretation Summary  There is no evidence of hemodynamically significant stenosis in either  carotid system. Antegrade flow in both vertebral arteries. Normal flow in both subclavian arteries. MRI  IMPRESSION:  1.   No infarction or mass. Partial empty sella configuration. ROS:  Review of Systems   Constitutional: Negative. Respiratory: Negative. Cardiovascular: Negative. Neurological: Negative. Right cheek has no tingling but it feels a little tight.         Date admitted: 5/28/19    Date discharged: 5/30/19    Date of face to face: 6/7/19    Medication reconciliation performed: Yes    Medications added/changed/discontinued: add lipitor and ASA 81mg    Review discharge instructions: Yes    Need for follow up on in hospital testing: No    Need for follow up with specialist: No  Name of specialist: none    Does patient have help at home: Yes  Name: daughter    Barriers to obtaining medications: No, but, goes to [facility] was charged 49.00 for lipitor and had to join their club for 20.00 to get that price. Other pharmacies offer it for around 6 dollars. Patient/family educated on cause of hospitalization: Yes    Patient/family able to repeat back cause of hospitalization, disease process, medication changes, and when to seek help: Yes    Patient Past Records were reviewed:  yes    Vitals:    06/07/19 1230   BP: (!) 142/91   Pulse: 87   Resp: 16   Temp: 97 °F (36.1 °C)   TempSrc: Oral   SpO2: 96%   Weight: 161 lb (73 kg)   Height: 4' 11\" (1.499 m)      Body mass index is 32.52 kg/m². Physical assessment:  Physical Exam   Constitutional: She is oriented to person, place, and time and well-developed, well-nourished, and in no distress. Vital signs are normal.   HENT:   Head: Normocephalic and atraumatic. Cardiovascular: Normal rate, regular rhythm and normal heart sounds. Pulmonary/Chest: Effort normal and breath sounds normal.   Neurological: She is alert and oriented to person, place, and time. She has normal motor skills and intact cranial nerves. Gait normal.   Skin: Skin is warm, dry and intact. Nursing note and vitals reviewed.       Patient Active Problem List   Diagnosis Code    Bursitis of shoulder, left M75.52    Subcutaneous mass-lt/ neck R22.9    Keratinous cyst-left neck L72.0    Primary osteoarthritis of left hip M16.12    Family history of CVA Z82.3    Osteopenia M85.80    Hyperlipidemia E78.5    Pre-diabetes R73.03    Severe obesity (HCC) E66.01    Numbness and tingling of right face R20.0, R20.2       Past Medical History:   Diagnosis Date    Arthritis     Diverticulitis           Social History     Socioeconomic History    Marital status: SINGLE     Spouse name: Not on file    Number of children: Not on file    Years of education: Not on file    Highest education level: Not on file   Occupational History    Not on file   Social Needs    Financial resource strain: Not on file   Liz Hull Food insecurity:     Worry: Not on file     Inability: Not on file    Transportation needs:     Medical: Not on file     Non-medical: Not on file   Tobacco Use    Smoking status: Never Smoker    Smokeless tobacco: Never Used   Substance and Sexual Activity    Alcohol use: No     Alcohol/week: 0.0 oz    Drug use: No    Sexual activity: Not Currently   Lifestyle    Physical activity:     Days per week: Not on file     Minutes per session: Not on file    Stress: Not on file   Relationships    Social connections:     Talks on phone: Not on file     Gets together: Not on file     Attends Rastafarian service: Not on file     Active member of club or organization: Not on file     Attends meetings of clubs or organizations: Not on file     Relationship status: Not on file    Intimate partner violence:     Fear of current or ex partner: Not on file     Emotionally abused: Not on file     Physically abused: Not on file     Forced sexual activity: Not on file   Other Topics Concern    Not on file   Social History Narrative    Not on file     Family History   Problem Relation Age of Onset    Hypertension Mother     Stroke Mother     Breast Cancer Other 33       Current Outpatient Medications   Medication Sig Dispense Refill    aspirin 81 mg chewable tablet Take 1 Tab by mouth daily. 30 Tab 0    atorvastatin (LIPITOR) 10 mg tablet Take 1 Tab by mouth nightly. 30 Tab 0    diphenhydrAMINE (BENADRYL ALLERGY) 25 mg tablet Take 1 Tab by mouth nightly as needed for Sleep. 15 Tab 0    Calcium-Cholecalciferol, D3, (CALCIUM 600 WITH VITAMIN D3) 600 mg(1,500mg) -400 unit chew Take 1 Tab by mouth daily.  cyanocobalamin, vitamin B-12, 500 mcg TbDi 1 Tab by SubLINGual route daily.  fish oil-dha-epa 1,200-144-216 mg cap Take 2 Caps by mouth two (2) times a day.  co-enzyme Q-10 (CO Q-10) 100 mg capsule Take 100 mg by mouth daily.      Aaliyah Krause, Drug: Plant Sterol   Dose: unknown   Sig: Take 1 tablet by mouth daily. Social History     Tobacco Use   Smoking Status Never Smoker   Smokeless Tobacco Never Used       Allergies   Allergen Reactions    Latex Rash    Naprosyn [Naproxen] Itching    Strawberry Itching and Swelling     Per pt report       Assessment/ Plan:   Diagnoses and all orders for this visit:    1. Numbness and tingling of right face    admitited for facial numbness  Workup revealed normal MRI and CT head, unremarkable 2D echo and CPVL  Continue to monitor for possible future TIA's or CVA's    Follow-up and Dispositions    · Return if symptoms worsen or fail to improve. Greater than 50% of the time was spent in counseling and coordination of care. I have discussed the diagnosis with the patient and the intended plan as seen in the above orders. The patient has received an After-Visit Summary and questions were answered concerning future plans. Medication Side Effects and Warnings were discussed with patient: yes      Return to clinic if sxs persist, to ER if sxs worsen    Patient verbalized understanding of above instructions. AVS printed and given to pt. Yamileth Barnes, [facility]  [address] [address] [address] [facility] [address]  [id number]

## 2019-06-07 NOTE — PATIENT INSTRUCTIONS
Transient Ischemic Attack: Care Instructions  Your Care Instructions    A transient ischemic attack (TIA) is when blood flow to a part of your brain is blocked for a short time. A TIA is like a stroke but usually lasts only a few minutes. A TIA does not cause lasting brain damage. Any vision problems, slurred speech, or other symptoms usually go away in 10 to 20 minutes. But they may last for up to 24 hours. TIAs are often warning signs of a stroke. Some people who have a TIA may have a stroke in the future. A stroke can cause symptoms like those of a TIA. But a stroke causes lasting damage to your brain. You can take steps to help prevent a stroke. One thing you can do is get early treatment. If you have other new symptoms, or if your symptoms do not get better, go back to the emergency room or call your doctor right away. Getting treatment right away may prevent long-term brain damage caused by a stroke. The doctor has checked you carefully, but problems can develop later. If you notice any problems or new symptoms, get medical treatment right away. Follow-up care is a key part of your treatment and safety. Be sure to make and go to all appointments, and call your doctor if you are having problems. It's also a good idea to know your test results and keep a list of the medicines you take. How can you care for yourself at home? Medicines    · Be safe with medicines. Take your medicines exactly as prescribed. Call your doctor if you think you are having a problem with your medicine.     · If you take a blood thinner, such as aspirin, be sure you get instructions about how to take your medicine safely.  Blood thinners can cause serious bleeding problems.     · Call your doctor if you are not able to take your medicines for any reason.     · Do not take any over-the-counter medicines or herbal products without talking to your doctor first.     · If you take birth control pills or hormone therapy, talk to your doctor. Ask if these treatments are right for you.    Lifestyle changes    · Do not smoke. If you need help quitting, talk to your doctor about stop-smoking programs and medicines.     · Be active. If your doctor recommends it, get more exercise. Walking is a good choice. Bit by bit, increase the amount you walk every day. Try for at least 30 minutes on most days of the week. You also may want to swim, bike, or do other activities.     · Eat heart-healthy foods. These include fruits, vegetables, high-fiber foods, fish, and foods that are low in sodium, saturated fat, and trans fat.     · Stay at a healthy weight. Lose weight if you need to.     · Limit alcohol to 2 drinks a day for men and 1 drink a day for women.    Staying healthy    · Manage other health problems such as diabetes, high blood pressure, and high cholesterol.     · Get the flu vaccine every year. When should you call for help? Call 911 anytime you think you may need emergency care. For example, call if:    · You have new or worse symptoms of a stroke. These may include:  ? Sudden numbness, tingling, weakness, or loss of movement in your face, arm, or leg, especially on only one side of your body. ? Sudden vision changes. ? Sudden trouble speaking. ? Sudden confusion or trouble understanding simple statements. ? Sudden problems with walking or balance. ? A sudden, severe headache that is different from past headaches. Call 911 even if these symptoms go away in a few minutes.     · You feel like you are having another TIA.    Watch closely for changes in your health, and be sure to contact your doctor if you have any problems. Where can you learn more? Go to http://ada-korina.info/. Enter ([phone] [phone] in the search box to learn more about \"Transient Ischemic Attack: Care Instructions. \"  Current as of: September 26, 2018  Content Version: 11.9  © 4314-6915 [employer].  Care instructions adapted under license by [employer] (which disclaims liability or warranty for this information). If you have questions about a medical condition or this instruction, always ask your healthcare professional. [address] any warranty or liability for your use of this information.

## 2020-01-15 ENCOUNTER — OFFICE VISIT (OUTPATIENT)
Dept: FAMILY MEDICINE CLINIC | Age: 65
End: 2020-01-15

## 2020-01-15 VITALS
TEMPERATURE: 97 F | HEART RATE: 67 BPM | WEIGHT: 170 LBS | RESPIRATION RATE: 16 BRPM | DIASTOLIC BLOOD PRESSURE: 70 MMHG | HEIGHT: 59 IN | BODY MASS INDEX: 34.27 KG/M2 | OXYGEN SATURATION: 99 % | SYSTOLIC BLOOD PRESSURE: 138 MMHG

## 2020-01-15 DIAGNOSIS — L30.9 ECZEMA, UNSPECIFIED TYPE: ICD-10-CM

## 2020-01-15 DIAGNOSIS — Z23 ENCOUNTER FOR IMMUNIZATION: ICD-10-CM

## 2020-01-15 DIAGNOSIS — Z13.1 SCREENING FOR DIABETES MELLITUS: ICD-10-CM

## 2020-01-15 DIAGNOSIS — Z00.00 WELCOME TO MEDICARE PREVENTIVE VISIT: Primary | ICD-10-CM

## 2020-01-15 DIAGNOSIS — Z12.31 ENCOUNTER FOR SCREENING MAMMOGRAM FOR MALIGNANT NEOPLASM OF BREAST: ICD-10-CM

## 2020-01-15 DIAGNOSIS — Z78.0 POSTMENOPAUSAL STATE: ICD-10-CM

## 2020-01-15 DIAGNOSIS — Z13.6 SCREENING FOR ISCHEMIC HEART DISEASE: ICD-10-CM

## 2020-01-15 RX ORDER — MONTELUKAST SODIUM 10 MG/1
10 TABLET ORAL DAILY
COMMUNITY
End: 2020-08-18

## 2020-01-15 RX ORDER — BETAMETHASONE DIPROPIONATE 0.5 MG/G
CREAM TOPICAL 2 TIMES DAILY
Qty: 45 G | Refills: 0 | Status: SHIPPED | OUTPATIENT
Start: 2020-01-15 | End: 2020-11-09 | Stop reason: SDUPTHER

## 2020-01-15 RX ORDER — CEFUROXIME AXETIL 250 MG/1
250 TABLET ORAL 2 TIMES DAILY
COMMUNITY
End: 2020-08-18

## 2020-01-15 NOTE — PROGRESS NOTES
After obtaining consent, and per orders of Dr. Dania Day, injection of Prevnar 13 given by Alexander Luke LPN. Patient tolerated injection well. Patient unable to wait to be observed, needed to  kids.

## 2020-01-15 NOTE — ACP (ADVANCE CARE PLANNING)
Advance Care Planning (ACP) Provider Conversation Snapshot    Date of ACP Conversation: 01/15/20  Persons included in Conversation:  patient  Length of ACP Conversation in minutes:  <16 minutes (Non-Billable)    Authorized Decision Maker (if patient is incapable of making informed decisions):    This person is:   her daughters            For Patients with Decision Making Capacity:   Values/Goals: Exploration of values, goals, and preferences if recovery is not expected, even with continued medical treatment in the event of:  Imminent death  Severe, permanent brain injury    Conversation Outcomes / Follow-Up Plan:   Recommended completion of Advance Directive form after review of ACP materials and conversation with prospective healthcare agent

## 2020-01-15 NOTE — PROGRESS NOTES
Chief Complaint   Patient presents with    Follow Up Chronic Condition     Hyperlipidemia; patient is fasting       Pt is a 71y.o. year old female who presents for follow up of her chronic medical problems    Health Maintenance Due   Topic Date Due    Shingrix Vaccine Age 49> (1 of 2) 12/12/2004    GLAUCOMA SCREENING Q2Y  12/12/2019    Pneumococcal 65+ years (1 of 1 - PPSV23) 12/12/2019    MEDICARE YEARLY EXAM  01/15/2020   Previous colonoscopy was done in [facility]      BP Readings from Last 3 Encounters:   01/21/20 170/87   01/15/20 138/70   06/07/19 (!) 142/91       Wt Readings from Last 3 Encounters:   01/21/20 173 lb (78.5 kg)   01/15/20 170 lb (77.1 kg)   06/07/19 161 lb (73 kg)     Lab Results   Component Value Date/Time    Cholesterol, total 151 05/29/2019 03:29 AM    HDL Cholesterol 54 05/29/2019 03:29 AM    LDL, calculated 76 05/29/2019 03:29 AM    VLDL, calculated 14 10/30/2018 10:40 AM    Triglyceride 106 05/29/2019 03:29 AM    CHOL/HDL Ratio 2.8 05/29/2019 03:29 AM   On Lipitor-not taking      [facility]    ROS:    Pt denies: Wt loss, Fever/Chills, HA, Visual changes, Fatigue, Chest pain, SOB, COPELAND, Abd pain, N/V/D/C, Blood in stool or urine, Edema. Pertinent positive as above in HPI. All others were negative    Patient Active Problem List   Diagnosis Code    Bursitis of shoulder, left M75.52    Subcutaneous mass-lt/ neck R22.9    Keratinous cyst-left neck L72.0    Primary osteoarthritis of left hip M16.12    Family history of CVA Z82.3    Osteopenia M85.80    Hyperlipidemia E78.5    Pre-diabetes R73.03    Severe obesity (HCC) E66.01    Numbness and tingling of right face R20.0, R20.2       Past Medical History:   Diagnosis Date    Arthritis     Diverticulitis        Current Outpatient Medications   Medication Sig Dispense Refill    montelukast (SINGULAIR) 10 mg tablet Take 10 mg by mouth daily.       cefUROXime (CEFTIN) 250 mg tablet Take 250 mg by mouth two (2) times a day.  betamethasone dipropionate (DIPROSONE) 0.05 % topical cream Apply  to affected area two (2) times a day. 45 g 0    aspirin 81 mg chewable tablet Take 1 Tab by mouth daily. 30 Tab 0    Calcium-Cholecalciferol, D3, (CALCIUM 600 WITH VITAMIN D3) 600 mg(1,500mg) -400 unit chew Take 1 Tab by mouth daily.  cyanocobalamin, vitamin B-12, 500 mcg TbDi 1 Tab by SubLINGual route daily.  fish oil-dha-epa 1,200-144-216 mg cap Take 2 Caps by mouth two (2) times a day.  co-enzyme Q-10 (CO Q-10) 100 mg capsule Take 100 mg by mouth daily. Social History     Tobacco Use   Smoking Status Never Smoker   Smokeless Tobacco Never Used       Allergies   Allergen Reactions    Latex Rash    Naprosyn [Naproxen] Itching    Strawberry Itching and Swelling     Per pt report       Patient Labs were reviewed: yes      Patient Past Records were reviewed:  yes        Objective:     Vitals:    01/15/20 1006   BP: 138/70   Pulse: 67   Resp: 16   Temp: 97 °F (36.1 °C)   TempSrc: Oral   SpO2: 99%   Weight: 170 lb (77.1 kg)   Height: 4' 11\" (1.499 m)     Body mass index is 34.34 kg/m². Exam:   Appearance: alert, well appearing,  oriented to person, place, and time, acyanotic, in no respiratory distress and well hydrated. HEENT:  NC/AT, pink conj, anicteric sclerae  Neck:  No cervical lymphadenopathy, no JVD, no thyromegaly, no carotid bruit  Heart:  RRR without M/R/G  Lungs:  CTAB, no rhonchi, rales, or wheezes with good air exchange   Abdomen:  Non-tender, pos bowel sounds, no hepatosplenomegaly  Ext:  No C/C/E    Skin: no rash  Neuro: no lateralizing signs, CNs II-XII intact      Assessment/ Plan:   Diagnoses and all orders for this visit:    1. Welcome to Medicare preventive visit  -     AMB POC EKG ROUTINE W/ 12 LEADS, SCREEN ()    2. Encounter for screening mammogram for malignant neoplasm of breast  -     LENCHO MAMMO BI SCREENING INCL CAD; Future    3.  Postmenopausal state  - DEXA BONE DENSITY STUDY AXIAL; Future    4. Encounter for immunization  -     ADMIN PNEUMOCOCCAL VACCINE  -     PNEUMOCOCCAL CONJ VACCINE 13 VALENT IM    5. Screening for diabetes mellitus  -     GLUCOSE, RANDOM; Future    6. Screening for ischemic heart disease  -     LIPID PANEL; Future    7. Eczema, unspecified type  -     betamethasone dipropionate (DIPROSONE) 0.05 % topical cream; Apply  to affected area two (2) times a day. Follow-up and Dispositions    · Return in about 6 months (around 7/15/2020) for follow up. I have discussed the diagnosis with the patient and the intended plan as seen in the above orders. The patient has received an After-Visit Summary and questions were answered concerning future plans. Medication Side Effects and Warnings were discussed with patient: yes    Patient verbalized understanding of above instructions. Medina Lopez MD  Internal Medicine  [facility]      This is a \"Welcome to Medicare\"  Initial Preventive Physical Examination (IPPE) providing Personalized Prevention Plan Services (Performed in the first 12 months of enrollment)    I have reviewed the patient's medical history in detail and updated the computerized patient record. History     Past Medical History:   Diagnosis Date    Arthritis     Diverticulitis       History reviewed. No pertinent surgical history. Current Outpatient Medications   Medication Sig Dispense Refill    montelukast (SINGULAIR) 10 mg tablet Take 10 mg by mouth daily.  cefUROXime (CEFTIN) 250 mg tablet Take 250 mg by mouth two (2) times a day.  betamethasone dipropionate (DIPROSONE) 0.05 % topical cream Apply  to affected area two (2) times a day. 45 g 0    aspirin 81 mg chewable tablet Take 1 Tab by mouth daily. 30 Tab 0    Calcium-Cholecalciferol, D3, (CALCIUM 600 WITH VITAMIN D3) 600 mg(1,500mg) -400 unit chew Take 1 Tab by mouth daily.       cyanocobalamin, vitamin B-12, 500 mcg TbDi 1 Tab by SubLINGual route daily.  fish oil-dha-epa 1,200-144-216 mg cap Take 2 Caps by mouth two (2) times a day.  co-enzyme Q-10 (CO Q-10) 100 mg capsule Take 100 mg by mouth daily. Allergies   Allergen Reactions    Latex Rash    Naprosyn [Naproxen] Itching    Strawberry Itching and Swelling     Per pt report       Family History   Problem Relation Age of Onset    Hypertension Mother     Stroke Mother     Breast Cancer Other 35     Social History     Tobacco Use    Smoking status: Never Smoker    Smokeless tobacco: Never Used   Substance Use Topics    Alcohol use: No     Alcohol/week: 0.0 standard drinks       Depression Risk Screen     3 most recent PHQ Screens 1/15/2020   Little interest or pleasure in doing things Not at all   Feeling down, depressed, irritable, or hopeless Not at all   Total Score PHQ 2 0       Alcohol Risk Factor Screening:   Do you average 1 drink per night or more than 7 drinks a week:  No    On any one occasion in the past three months have you have had more than 3 drinks containing alcohol:  No      Functional Ability and Level of Safety   Diet: No special diet    Hearing: Hearing is good. Vision Screening:  Vision is good. No exam data present      Activities of Daily Living: The home contains: no safety equipment. Patient does total self care    Ambulation: with no difficulty    Exercise level: sedentary    Fall Risk Screen:  Fall Risk Assessment, last 12 mths 1/15/2020   Able to walk? Yes   Fall in past 12 months?  No   Fall with injury? -   Number of falls in past 12 months -       Abuse Screen:  Patient is not abused    Screening EKG   EKG order placed: Yes  EKG showed NSR, no LVH, no ST-T wave changes    Patient Care Team   Patient Care Team:  Foreign Zendejas MD as PCP - General (Internal Medicine)  Foreign Zendejas MD as PCP - [facility] Empaneled Provider  Jessica Velasco MD (General Surgery)     End of Life Planning   Advanced care planning directives were discussed with the patient and /or family/caregiver. Assessment/Plan   Education and counseling provided:  Are appropriate based on today's review and evaluation  End-of-Life planning (with patient's consent)-see ACP note  Pneumococcal Vaccine-today  Influenza Vaccine-done  Screening Mammography-ordered  Screening Pap and pelvic (covered once every 2 years)-done   Colorectal cancer screening tests-up to date  Cardiovascular screening blood test-fasting lipids today  Bone mass measurement (DEXA)-ordered  Screening for glaucoma-to schedule  Diabetes screening test-FBs today    Diagnoses and all orders for this visit:    1. Welcome to Medicare preventive visit-Refer to above for plan and to patient instructions for recommendations on HM  -     AMB POC EKG ROUTINE W/ 12 LEADS, SCREEN ()    2. Encounter for screening mammogram for malignant neoplasm of breast  -     LENCHO MAMMO BI SCREENING INCL CAD; Future    3. Postmenopausal state  -     DEXA BONE DENSITY STUDY AXIAL; Future    4. Encounter for immunization  -     ADMIN PNEUMOCOCCAL VACCINE  -     PNEUMOCOCCAL CONJ VACCINE 13 VALENT IM    5. Screening for diabetes mellitus  -     GLUCOSE, RANDOM; Future    6. Screening for ischemic heart disease  -     LIPID PANEL; Future    7. Eczema, unspecified type  -     betamethasone dipropionate (DIPROSONE) 0.05 % topical cream; Apply  to affected area two (2) times a day.       Health Maintenance Due   Topic Date Due    Shingrix Vaccine Age 49> (1 of 2)-at her pharmacy 12/12/2004    GLAUCOMA SCREENING N0S-sraejna to schedule this  12/12/2019     RTC yearly for wellness visit

## 2020-01-15 NOTE — PATIENT INSTRUCTIONS
Medicare Wellness Visit, Female The best way to live healthy is to have a lifestyle where you eat a well-balanced diet, exercise regularly, limit alcohol use, and quit all forms of tobacco/nicotine, if applicable. Regular preventive services are another way to keep healthy. Preventive services (vaccines, screening tests, monitoring & exams) can help personalize your care plan, which helps you manage your own care. Screening tests can find health problems at the earliest stages, when they are easiest to treat. [facility] follows the current, evidence-based guidelines published by the [facility] ([facility]) when recommending preventive services for our patients. Because we follow these guidelines, sometimes recommendations change over time as research supports it. (For example, mammograms used to be recommended annually. Even though Medicare will still pay for an annual mammogram, the newer guidelines recommend a mammogram every two years for women of average risk). Of course, you and your doctor may decide to screen more often for some diseases, based on your risk and your co-morbidities (chronic disease you are already diagnosed with). Preventive services for you include: - Medicare offers their members a free annual wellness visit, which is time for you and your primary care provider to discuss and plan for your preventive service needs. Take advantage of this benefit every year! 
-All adults over the age of 72 should receive the recommended pneumonia vaccines. Current USPSTF guidelines recommend a series of two vaccines for the best pneumonia protection.  
-All adults should have a flu vaccine yearly and a tetanus vaccine every 10 years.  
-All adults age 48 and older should receive the shingles vaccines (series of two vaccines). -All adults age 38-68 who are overweight should have a diabetes screening test once every three years. -All adults born between [birth year] and 1966 should be screened once for Hepatitis C. 
-Other screening tests and preventive services for persons with diabetes include: an eye exam to screen for diabetic retinopathy, a kidney function test, a foot exam, and stricter control over your cholesterol.  
-Cardiovascular screening for adults with routine risk involves an electrocardiogram (ECG) at intervals determined by your doctor.  
-Colorectal cancer screenings should be done for adults age 54-65 with no increased risk factors for colorectal cancer. There are a number of acceptable methods of screening for this type of cancer. Each test has its own benefits and drawbacks. Discuss with your doctor what is most appropriate for you during your annual wellness visit. The different tests include: colonoscopy (considered the best screening method), a fecal occult blood test, a fecal DNA test, and sigmoidoscopy. 
 
-A bone mass density test is recommended when a woman turns 65 to screen for osteoporosis. This test is only recommended one time, as a screening. Some providers will use this same test as a disease monitoring tool if you already have osteoporosis. -Breast cancer screenings are recommended every other year for women of normal risk, age 54-69. 
-Cervical cancer screenings for women over age 72 are only recommended with certain risk factors. Here is a list of your current Health Maintenance items (your personalized list of preventive services) with a due date: 
Health Maintenance Due Topic Date Due  Shingles Vaccine (1 of 2) 12/12/2004  Glaucoma Screening   12/12/2019  Pneumococcal Vaccine (1 of 1 - PPSV23) 12/12/2019 [address] [place] [address] Annual Well Visit  01/15/2020

## 2020-01-15 NOTE — PROGRESS NOTES
Chief Complaint   Patient presents with    Follow Up Chronic Condition     Hyperlipidemia; patient is fasting     1. Have you been to the ER, urgent care clinic since your last visit? Hospitalized since your last visit? Yes [facility]    2. Have you seen or consulted any other health care providers outside of the [address] [place] [address] since your last visit? Include any pap smears or colon screening.  No      Appt 01/21/2020  for eye visit at [facility].

## 2020-01-17 LAB
CHOLEST SERPL-MCNC: NORMAL MG/DL
GLUCOSE SERPL-MCNC: NORMAL MG/DL
HDLC SERPL-MCNC: NORMAL MG/DL
INTERPRETATION, 910389: NORMAL
PDF IMAGE, 910387: NORMAL
TRIGL SERPL-MCNC: NORMAL MG/DL (ref ?–150)

## 2020-01-21 ENCOUNTER — OFFICE VISIT (OUTPATIENT)
Dept: FAMILY MEDICINE CLINIC | Age: 65
End: 2020-01-21

## 2020-01-21 VITALS
OXYGEN SATURATION: 98 % | TEMPERATURE: 97.1 F | HEART RATE: 71 BPM | SYSTOLIC BLOOD PRESSURE: 170 MMHG | DIASTOLIC BLOOD PRESSURE: 87 MMHG | RESPIRATION RATE: 16 BRPM | HEIGHT: 59 IN | BODY MASS INDEX: 34.88 KG/M2 | WEIGHT: 173 LBS

## 2020-01-21 DIAGNOSIS — N64.4 BREAST PAIN, RIGHT: Primary | ICD-10-CM

## 2020-01-21 NOTE — PROGRESS NOTES
[address]               [address]               [phone]      Ajay Ron is a 71 y.o. female and presents with Breast pain (Right Breast-Pt had injection on right side wensday- Renee ever since )       Assessment/Plan:    Diagnoses and all orders for this visit:    1. Breast pain, right    This is most likely a reaction to the pneumococcal injection she had last Wednesday  Recommended she apply heat or ice for comfort to assist in pain relief  Informed her to let us know if the pain does not continue to resolve over the next couple of days  She does already have a mammogram ordered    Follow-up and Dispositions    · Return if symptoms worsen or fail to improve. Subjective:    Right breast pain  Onset: 1/15/20  Characteristics: started after receiving pneumococcal vaccine, at first was in the arm and then moved to the breast, the arm and breast were swollen, the swelling is diminished but there is still pain, arm is a little tingle but continues to have pain in breast  Treatment: abdirashid, tylenol  Abdirashid and tylenol helped some  Lying down makes the pain worse, cannot lie on right side    ROS:As stated in HPI, otherwise all others negative. ROS    The problem list was updated as a part of today's visit. Patient Active Problem List   Diagnosis Code    Bursitis of shoulder, left M75.52    Subcutaneous mass-lt/ neck R22.9    Keratinous cyst-left neck L72.0    Primary osteoarthritis of left hip M16.12    Family history of CVA Z82.3    Osteopenia M85.80    Hyperlipidemia E78.5    Pre-diabetes R73.03    Severe obesity (HCC) E66.01    Numbness and tingling of right face R20.0, R20.2       The PSH, FH were reviewed. SH:  Social History     Tobacco Use    Smoking status: Never Smoker    Smokeless tobacco: Never Used   Substance Use Topics    Alcohol use: No     Alcohol/week: 0.0 standard drinks    Drug use:  No Medications/Allergies:  Current Outpatient Medications on File Prior to Visit   Medication Sig Dispense Refill    montelukast (SINGULAIR) 10 mg tablet Take 10 mg by mouth daily.  cefUROXime (CEFTIN) 250 mg tablet Take 250 mg by mouth two (2) times a day.  betamethasone dipropionate (DIPROSONE) 0.05 % topical cream Apply  to affected area two (2) times a day. 45 g 0    aspirin 81 mg chewable tablet Take 1 Tab by mouth daily. 30 Tab 0    Calcium-Cholecalciferol, D3, (CALCIUM 600 WITH VITAMIN D3) 600 mg(1,500mg) -400 unit chew Take 1 Tab by mouth daily.  cyanocobalamin, vitamin B-12, 500 mcg TbDi 1 Tab by SubLINGual route daily.  fish oil-dha-epa 1,200-144-216 mg cap Take 2 Caps by mouth two (2) times a day.  co-enzyme Q-10 (CO Q-10) 100 mg capsule Take 100 mg by mouth daily. No current facility-administered medications on file prior to visit. Allergies   Allergen Reactions    Latex Rash    Naprosyn [Naproxen] Itching    Strawberry Itching and Swelling     Per pt report       Objective:  Visit Vitals  /87   Pulse 71   Temp 97.1 °F (36.2 °C) (Oral)   Resp 16   Ht 4' 11\" (1.499 m)   Wt 173 lb (78.5 kg)   SpO2 98%   BMI 34.94 kg/m²    Body mass index is 34.94 kg/m². Physical assessment  Physical Exam  Vitals signs and nursing note reviewed. HENT:      Head: Normocephalic and atraumatic. Cardiovascular:      Rate and Rhythm: Normal rate and regular rhythm. Heart sounds: Normal heart sounds. Pulmonary:      Effort: Pulmonary effort is normal.      Breath sounds: Normal breath sounds. Chest:       Musculoskeletal:        Back:    Skin:     General: Skin is warm and dry. Neurological:      Mental Status: She is alert and oriented to person, place, and time. Gait: Gait is intact.            Labwork and Ancillary Studies:    CBC w/Diff  Lab Results   Component Value Date/Time    WBC 4.0 05/29/2019 03:29 AM    HGB 13.0 05/29/2019 03:29 AM    PLATELET 170 05/29/2019 03:29 AM         Basic Metabolic Profile  Lab Results   Component Value Date/Time    Sodium 142 05/29/2019 03:29 AM    Potassium 3.9 05/29/2019 03:29 AM    Chloride 108 (H) 05/29/2019 03:29 AM    CO2 26 05/29/2019 03:29 AM    Anion gap 8 05/29/2019 03:29 AM    Glucose CANCELED 01/15/2020 11:36 AM    BUN 13 05/29/2019 03:29 AM    Creatinine 0.8 05/29/2019 03:29 AM    BUN/Creatinine ratio 16 06/20/2017 01:12 PM    GFR est AA >60.0 05/29/2019 03:29 AM    GFR est non-AA >60 05/29/2019 03:29 AM    Calcium 8.7 05/29/2019 03:29 AM        Cholesterol  Lab Results   Component Value Date/Time    Cholesterol, total CANCELED 01/15/2020 11:36 AM    HDL Cholesterol CANCELED 01/15/2020 11:36 AM    LDL, calculated 76 05/29/2019 03:29 AM    Triglyceride CANCELED 01/15/2020 11:36 AM    CHOL/HDL Ratio 2.8 05/29/2019 03:29 AM       Health Maintenance:   Health Maintenance   Topic Date Due    Shingrix Vaccine Age 50> (1 of 2) 12/12/2004    GLAUCOMA SCREENING Q2Y  12/12/2019    BREAST CANCER SCRN MAMMOGRAM  07/02/2020    MEDICARE YEARLY EXAM  01/15/2021    Pneumococcal 65+ years (2 of 2 - PPSV23) 01/15/2021    COLONOSCOPY  02/20/2022    DTaP/Tdap/Td series (2 - Td) 12/24/2024    Hepatitis C Screening  Completed    Bone Densitometry (Dexa) Screening  Completed    Influenza Age 5 to Adult  Completed       I have discussed the diagnosis with the patient and the intended plan as seen in the above orders. The patient has received an After-Visit Summary and questions were answered concerning future plans. An After Visit Summary was printed and given to the patient. All diagnosis have been discussed with the patient and all of the patient's questions have been answered. Follow-up and Dispositions    · Return if symptoms worsen or fail to improve. Rashad Galindo, [facility]  [address] [facility] [address]  [address]

## 2020-01-21 NOTE — PROGRESS NOTES
Chief Complaint   Patient presents with    Breast pain     Right Breast-Pt had injection on right side wensday- Renee ever since      1. Have you been to the ER, urgent care clinic since your last visit? Hospitalized since your last visit? NO    2. Have you seen or consulted any other health care providers outside of the [address] [place] [address] since your last visit? Include any pap smears or colon screening.  NO

## 2020-01-29 ENCOUNTER — OFFICE VISIT (OUTPATIENT)
Dept: FAMILY MEDICINE CLINIC | Age: 65
End: 2020-01-29

## 2020-01-29 VITALS
SYSTOLIC BLOOD PRESSURE: 141 MMHG | RESPIRATION RATE: 17 BRPM | HEART RATE: 77 BPM | OXYGEN SATURATION: 97 % | DIASTOLIC BLOOD PRESSURE: 91 MMHG | HEIGHT: 59 IN | WEIGHT: 171 LBS | BODY MASS INDEX: 34.47 KG/M2 | TEMPERATURE: 96.9 F

## 2020-01-29 DIAGNOSIS — R03.0 ELEVATED BP WITHOUT DIAGNOSIS OF HYPERTENSION: ICD-10-CM

## 2020-01-29 DIAGNOSIS — G47.00 INSOMNIA, UNSPECIFIED TYPE: ICD-10-CM

## 2020-01-29 DIAGNOSIS — M25.552 LEFT HIP PAIN: Primary | ICD-10-CM

## 2020-01-29 DIAGNOSIS — Z13.6 SCREENING FOR ISCHEMIC HEART DISEASE: ICD-10-CM

## 2020-01-29 NOTE — PATIENT INSTRUCTIONS
Hip Bursitis: Exercises Introduction Here are some examples of exercises for you to try. The exercises may be suggested for a condition or for rehabilitation. Start each exercise slowly. Ease off the exercises if you start to have pain. You will be told when to start these exercises and which ones will work best for you. How to do the exercises Hip rotator stretch 1. Lie on your back with both knees bent and your feet flat on the floor. 2. Put the ankle of your affected leg on your opposite thigh near your knee. 3. Use your hand to gently push your knee away from your body until you feel a gentle stretch around your hip. 4. Hold the stretch for 15 to 30 seconds. 5. Repeat 2 to 4 times. 6. Repeat steps 1 through 5, but this time use your hand to gently pull your knee toward your opposite shoulder. Iliotibial band stretch 1. Lean sideways against a wall. If you are not steady on your feet, hold on to a chair or counter. 2. Stand on the leg with the affected hip, with that leg close to the wall. Then cross your other leg in front of it. 3. Let your affected hip drop out to the side of your body and against wall. Then lean away from your affected hip until you feel a stretch. 4. Hold the stretch for 15 to 30 seconds. 5. Repeat 2 to 4 times. Straight-leg raises to the outside 1. Lie on your side, with your affected hip on top. 2. Tighten the front thigh muscles of your top leg to keep your knee straight. 3. Keep your hip and your leg straight in line with the rest of your body, and keep your knee pointing forward. Do not drop your hip back. 4. Lift your top leg straight up toward the ceiling, about 12 inches off the floor. Hold for about 6 seconds, then slowly lower your leg. 5. Repeat 8 to 12 times. Clamshell 1. Lie on your side, with your affected hip on top and your head propped on a pillow. Keep your feet and knees together and your knees bent. 2. Raise your top knee, but keep your feet together. Do not let your hips roll back. Your legs should open up like a clamshell. 3. Hold for 6 seconds. 4. Slowly lower your knee back down. Rest for 10 seconds. 5. Repeat 8 to 12 times. Follow-up care is a key part of your treatment and safety. Be sure to make and go to all appointments, and call your doctor if you are having problems. It's also a good idea to know your test results and keep a list of the medicines you take. Where can you learn more? Go to http://ada-korina.info/. Enter E556 in the search box to learn more about \"Hip Bursitis: Exercises. \" Current as of: June 26, 2019 Content Version: 12.2 © 7491-6425 [employer]. Care instructions adapted under license by [employer] (which disclaims liability or warranty for this information). If you have questions about a medical condition or this instruction, always ask your healthcare professional. [address] any warranty or liability for your use of this information.

## 2020-01-29 NOTE — PROGRESS NOTES
Chief Complaint   Patient presents with    Lethargy    Sleep Problem    Hip Pain     Left       Pt is a 71y.o. year old female who presents for an acute visit for the above    Health Maintenance Due   Topic Date Due    Shingrix Vaccine Age 49> (1 of 2) 12/12/2004    GLAUCOMA SCREENING Q2Y  12/12/2019       Wt Readings from Last 3 Encounters:   01/29/20 171 lb (77.6 kg)   01/21/20 173 lb (78.5 kg)   01/15/20 170 lb (77.1 kg)       BPs have been elevated at home-171/80 something at [facility]     BP Readings from Last 3 Encounters:   01/29/20 (!) 141/91   01/21/20 170/87   01/15/20 138/70   strong family hx of HTN  Denies salt intake    Seen by NP recently for pain on the right breast-thought to be a reaction from the Prevnar 13  ?allergic reaction to [address] [place] [address]  Did not take any med for it, better now    Left hip pain-ongoing for a while now but worse recently  Hurts worse at night, cannot sleep on the left side  Started exercising on the treadmill a week now since she got Silver sneakers    Allergic to NSAIDs  OTC Tylenol arthritis helps with the hip pain        ROS:    Pt denies: Wt loss, Fever/Chills, HA, Visual changes, Fatigue, Chest pain, SOB, COP[place]AND, Abd pain, N/V/D/C, Blood in stool or urine, Edema. Pertinent positive as above in HPI. All others were negative    Patient Active Problem List   Diagnosis Code    Bursitis of shoulder, left M75.52    Subcutaneous mass-lt/ neck R22.9    Keratinous cyst-left neck L72.0    Primary osteoarthritis of left hip M16.12    Family history of CVA Z82.3    Osteopenia M85.80    Hyperlipidemia E78.5    Pre-diabetes R73.03    Severe obesity (HCC) E66.01    Numbness and tingling of right face R20.0, R20.2       Past Medical History:   Diagnosis Date    Arthritis     Diverticulitis        Current Outpatient Medications   Medication Sig Dispense Refill    montelukast (SINGULAIR) 10 mg tablet Take 10 mg by mouth daily.       cefUROXime (CEFTIN) 250 mg tablet Take 250 mg by mouth two (2) times a day.  betamethasone dipropionate (DIPROSONE) 0.05 % topical cream Apply  to affected area two (2) times a day. 45 g 0    aspirin 81 mg chewable tablet Take 1 Tab by mouth daily. 30 Tab 0    Calcium-Cholecalciferol, D3, (CALCIUM 600 WITH VITAMIN D3) 600 mg(1,500mg) -400 unit chew Take 1 Tab by mouth daily.  cyanocobalamin, vitamin B-12, 500 mcg TbDi 1 Tab by SubLINGual route daily.  fish oil-dha-epa 1,200-144-216 mg cap Take 2 Caps by mouth two (2) times a day.  co-enzyme Q-10 (CO Q-10) 100 mg capsule Take 100 mg by mouth daily. Social History     Tobacco Use   Smoking Status Never Smoker   Smokeless Tobacco Never Used       Allergies   Allergen Reactions    Latex Rash    Naprosyn [Naproxen] Itching    Strawberry Itching and Swelling     Per pt report       Patient Labs were reviewed: yes      Patient Past Records were reviewed:  yes        Objective:     Vitals:    01/29/20 1328 01/29/20 1343   BP:  (!) 141/91   Pulse:  77   Resp:  17   Temp:  96.9 °F (36.1 °C)   SpO2:  97%   Weight: 171 lb (77.6 kg)    Height: 4' 11\" (1.499 m)      Body mass index is 34.54 kg/m². Exam:   Appearance: alert, well appearing,  oriented to person, place, and time, acyanotic, in no respiratory distress and well hydrated. HEENT:  NC/AT, pink conj, anicteric sclerae  Neck:  No cervical lymphadenopathy, no JVD, no thyromegaly, no carotid bruit  Heart:  RRR without M/R/G  Lungs:  CTAB, no rhonchi, rales, or wheezes with good air exchange   Abdomen:  Non-tender, pos bowel sounds, no hepatosplenomegaly  Ext:  No C/C/E, reproducible pain on the left hip   Skin: no rash  Neuro: no lateralizing signs, CNs II-XII intact      Assessment/ Plan:   Diagnoses and all orders for this visit:    1.  Left hip pain-likely bursitis-given exercises to do/avoid treadmill use for 1 week and may take prn Tylenol; check Xray and if no significant  arthritis will send to PT  -     XR HIP LT W OR WO PELV 2-3 VWS; Future    2. Elevated BP without diagnosis of hypertension-low sodium diet, home monitoring  -     CBC WITH AUTOMATED DIFF; Future  -     METABOLIC PANEL, COMPREHENSIVE; Future    3. Insomnia, unspecified type-advised to take Melatonin (used to work night shifts and not anymore)    4. Screening for ischemic heart disease-not enough blood was taken last visit so she will need this repeated  -     LIPID PANEL; Future        Follow-up and Dispositions    · Return for previous appt. I have discussed the diagnosis with the patient and the intended plan as seen in the above orders. The patient has received an After-Visit Summary and questions were answered concerning future plans. Medication Side Effects and Warnings were discussed with patient: yes    Patient verbalized understanding of above instructions.     Karin Holter, MD  Internal Medicine  [facility]

## 2020-01-29 NOTE — PROGRESS NOTES
Chief Complaint   Patient presents with    Lethargy    Sleep Problem    Hip Pain     Left     1. Have you been to the ER, urgent care clinic since your last visit? Hospitalized since your last visit? No    2. Have you seen or consulted any other health care providers outside of the [address] [place] [address] since your last visit? Include any pap smears or colon screening.  No

## 2020-01-30 LAB
A-G RATIO,AGRAT: 1.4 RATIO (ref 1.1–2.6)
ABSOLUTE LYMPHOCYTE COUNT, 10803: 1.5 K/UL (ref 1–4.8)
ALBUMIN SERPL-MCNC: 4.3 G/DL (ref 3.5–5)
ALP SERPL-CCNC: 73 U/L (ref 40–120)
ALT SERPL-CCNC: 15 U/L (ref 5–40)
ANION GAP SERPL CALC-SCNC: 10.9 MMOL/L
AST SERPL W P-5'-P-CCNC: 15 U/L (ref 10–37)
BASOPHILS # BLD: 0 K/UL (ref 0–0.2)
BASOPHILS NFR BLD: 0 % (ref 0–2)
BILIRUB SERPL-MCNC: 0.3 MG/DL (ref 0.2–1.2)
BUN SERPL-MCNC: 18 MG/DL (ref 6–22)
CALCIUM SERPL-MCNC: 9.2 MG/DL (ref 8.4–10.5)
CHLORIDE SERPL-SCNC: 99 MMOL/L (ref 98–110)
CHOLEST SERPL-MCNC: 244 MG/DL (ref 110–200)
CO2 SERPL-SCNC: 23 MMOL/L (ref 20–32)
CREAT SERPL-MCNC: 0.8 MG/DL (ref 0.8–1.4)
EOSINOPHIL # BLD: 0.1 K/UL (ref 0–0.5)
EOSINOPHIL NFR BLD: 3 % (ref 0–6)
ERYTHROCYTE [DISTWIDTH] IN BLOOD BY AUTOMATED COUNT: 15.7 % (ref 10–15.5)
GFRAA, 66117: >60
GFRNA, 66118: >60
GLOBULIN,GLOB: 3.1 G/DL (ref 2–4)
GLUCOSE SERPL-MCNC: 92 MG/DL (ref 70–99)
GRANULOCYTES,GRANS: 46 % (ref 40–75)
HCT VFR BLD AUTO: 44.5 % (ref 35.1–48.3)
HDLC SERPL-MCNC: 3.3 MG/DL (ref 0–5)
HDLC SERPL-MCNC: 75 MG/DL
HGB BLD-MCNC: 14.3 G/DL (ref 11.7–16.1)
LDL/HDL RATIO,LDHD: 2.1
LDLC SERPL CALC-MCNC: 156 MG/DL (ref 50–99)
LYMPHOCYTES, LYMLT: 43 % (ref 20–45)
MCH RBC QN AUTO: 26 PG (ref 26–34)
MCHC RBC AUTO-ENTMCNC: 32 G/DL (ref 31–36)
MCV RBC AUTO: 81 FL (ref 81–99)
MONOCYTES # BLD: 0.3 K/UL (ref 0.1–1)
MONOCYTES NFR BLD: 7 % (ref 3–12)
NEUTROPHILS # BLD AUTO: 1.6 K/UL (ref 1.8–7.7)
NON-HDL CHOLESTEROL, 011976: 169 MG/DL
PLATELET # BLD AUTO: 174 K/UL (ref 140–440)
PMV BLD AUTO: 10.9 FL (ref 9–13)
POTASSIUM SERPL-SCNC: 4 MMOL/L (ref 3.5–5.5)
PROT SERPL-MCNC: 7.4 G/DL (ref 6.2–8.1)
RBC # BLD AUTO: 5.5 M/UL (ref 3.8–5.2)
SODIUM SERPL-SCNC: 133 MMOL/L (ref 133–145)
TRIGL SERPL-MCNC: 66 MG/DL (ref 40–149)
VLDLC SERPL CALC-MCNC: 13 MG/DL (ref 8–30)
WBC # BLD AUTO: 3.4 K/UL (ref 4–11)

## 2020-02-13 ENCOUNTER — TELEPHONE (OUTPATIENT)
Dept: FAMILY MEDICINE CLINIC | Age: 65
End: 2020-02-13

## 2020-02-13 NOTE — TELEPHONE ENCOUNTER
Patient notified of lab results and DEXA results. Patient states that she will try cholesterol medication and Fosamax for one month because she is nervous of side effects.

## 2020-02-14 DIAGNOSIS — E78.5 HYPERLIPIDEMIA, UNSPECIFIED HYPERLIPIDEMIA TYPE: Primary | ICD-10-CM

## 2020-02-14 DIAGNOSIS — M81.0 AGE-RELATED OSTEOPOROSIS WITHOUT CURRENT PATHOLOGICAL FRACTURE: ICD-10-CM

## 2020-02-14 RX ORDER — ALENDRONATE SODIUM 70 MG/1
70 TABLET ORAL
Qty: 4 TAB | Refills: 3 | Status: SHIPPED | OUTPATIENT
Start: 2020-02-14 | End: 2020-08-18 | Stop reason: SINTOL

## 2020-02-14 RX ORDER — ATORVASTATIN CALCIUM 10 MG/1
10 TABLET, FILM COATED ORAL DAILY
Qty: 30 TAB | Refills: 3 | Status: SHIPPED | OUTPATIENT
Start: 2020-02-14 | End: 2020-07-29

## 2020-02-27 DIAGNOSIS — Z78.0 POSTMENOPAUSAL STATE: ICD-10-CM

## 2020-03-17 DIAGNOSIS — I10 ESSENTIAL HYPERTENSION: Primary | ICD-10-CM

## 2020-03-17 DIAGNOSIS — R07.9 CHEST PAIN, UNSPECIFIED TYPE: ICD-10-CM

## 2020-03-17 RX ORDER — AMLODIPINE BESYLATE 5 MG/1
5 TABLET ORAL DAILY
Qty: 90 TAB | Refills: 1 | Status: SHIPPED | OUTPATIENT
Start: 2020-03-17 | End: 2020-11-09

## 2020-03-18 DIAGNOSIS — Z12.31 ENCOUNTER FOR SCREENING MAMMOGRAM FOR MALIGNANT NEOPLASM OF BREAST: ICD-10-CM

## 2020-07-16 ENCOUNTER — VIRTUAL VISIT (OUTPATIENT)
Dept: FAMILY MEDICINE CLINIC | Age: 65
End: 2020-07-16

## 2020-07-16 ENCOUNTER — TELEPHONE (OUTPATIENT)
Dept: FAMILY MEDICINE CLINIC | Age: 65
End: 2020-07-16

## 2020-07-28 ENCOUNTER — TELEPHONE (OUTPATIENT)
Dept: FAMILY MEDICINE CLINIC | Age: 65
End: 2020-07-28

## 2020-07-29 DIAGNOSIS — E78.5 HYPERLIPIDEMIA, UNSPECIFIED HYPERLIPIDEMIA TYPE: ICD-10-CM

## 2020-07-29 RX ORDER — ATORVASTATIN CALCIUM 10 MG/1
TABLET, FILM COATED ORAL
Qty: 30 TAB | Refills: 3 | Status: SHIPPED | OUTPATIENT
Start: 2020-07-29 | End: 2021-03-11

## 2020-08-18 ENCOUNTER — VIRTUAL VISIT (OUTPATIENT)
Dept: FAMILY MEDICINE CLINIC | Age: 65
End: 2020-08-18

## 2020-08-18 DIAGNOSIS — I10 ESSENTIAL HYPERTENSION: Primary | ICD-10-CM

## 2020-08-18 DIAGNOSIS — E66.01 SEVERE OBESITY (HCC): ICD-10-CM

## 2020-08-18 DIAGNOSIS — E78.5 HYPERLIPIDEMIA, UNSPECIFIED HYPERLIPIDEMIA TYPE: ICD-10-CM

## 2020-08-18 DIAGNOSIS — M81.0 AGE-RELATED OSTEOPOROSIS WITHOUT CURRENT PATHOLOGICAL FRACTURE: ICD-10-CM

## 2020-08-18 DIAGNOSIS — R73.01 IMPAIRED FASTING GLUCOSE: ICD-10-CM

## 2020-08-18 PROBLEM — R20.2 NUMBNESS AND TINGLING OF RIGHT FACE: Status: RESOLVED | Noted: 2019-05-28 | Resolved: 2020-08-18

## 2020-08-18 PROBLEM — R20.0 NUMBNESS AND TINGLING OF RIGHT FACE: Status: RESOLVED | Noted: 2019-05-28 | Resolved: 2020-08-18

## 2020-08-18 NOTE — PROGRESS NOTES
Michael Sarkar is a 71 y.o. female who was seen by synchronous (real-time) audio-video technology on 2020 for Follow-up (6 month; Cholesterol, Prediabetes)        Assessment & Plan:   Diagnoses and all orders for this visit:    1. Essential hypertension-advised low sodium diet, continue with home monitoring  -     CBC WITH AUTOMATED DIFF; Future  -     METABOLIC PANEL, COMPREHENSIVE; Future    2. Hyperlipidemia, unspecified hyperlipidemia type-check fasting lipids and LFTs now that she has been taking Lipitor  -     METABOLIC PANEL, COMPREHENSIVE; Future  -     LIPID PANEL; Future    3. Age-related osteoporosis without current pathological fracture-on OTC Ca+D; had side effects on Fosamax and declines to try another med at this time    4. Impaired fasting glucose-continue with efforts at weight loss  -     METABOLIC PANEL, COMPREHENSIVE; Future  -     HEMOGLOBIN A1C W/O EAG; Future    5.  Severe obesity (HCC)-discussed limiting álvaro to 2199-4874/day and exercising at least 150 min a week        Follow-up and Dispositions    · Return in about 3 months (around 2020) for follow up; fasting labs, flu vaccine in   Subjective:     Health Maintenance Due   Topic Date Due    Shingrix Vaccine Age 50> (1 of 2)-advised to get this at her pharmacy 2004    GLAUCOMA SCREENING Q2Y  2019    A1C test (Diabetic or Prediabetic) -ordered 2020    Influenza Age 5 to Adult -in Sept  With labs 2020     Saw Cardio for HTN  Progress Notes  - documented in this encounter  Josesito Hanson MD - 2020 11:30 AM EDT  Formatting of this note might be different from the original.  [facility]  [address] [address]  Phone: [phone]  Fax: [phone]    Michael Sarkar  : 1955  8/3/2020  Sylvia Lux MD    Follow-up Visit:    (I10) Essential hypertension    (R06.02) SOB (shortness of breath)    PLAN:    She will try to stop amlodipine and check BP at home and use homeopathic BP Rx. Echo to evaluate for left ventricular hypertrophy and LV function reviewed with pt. If her COPELAND recurs despite control of blood pressure will consider nuclear stress. Follow-up in 4 months. IMPRESSION:    1. Recently identified hypertension, presumed essential, labile. 2. Dyspnea on exertion. 3. Shortness of breath at night. 4. Lipid disorder. ECHO CARDIOGRAM [facility]  Component Name Value Ref Range   EF Echo 68     Result Impression   :   DECREASED LEFT VENTRICULAR CAVITY SIZE AT 3.3 CM WITH MILD SEPTAL HYPERTROPHY OF THE LEFT   VENTRICLE. NORMAL LEFT VENTRICULAR SYSTOLIC FUNCTION WITH A CALCULATED EJECTION FRACTION OF 68%. NORMAL DIASTOLIC FUNCTION. NORMAL RIGHT VENTRICULAR SIZE AND SYSTOLIC FUNCTION. NO HEMODYNAMICALLY SIGNIFICANT VALVULAR PATHOLOGY. NORMAL PULMONARY ARTERY PRESSURE OF 23 MMHG. NO PERICARDIAL EFFUSION. Has been taking Norvasc prn-up and down-when stressed it goes up  BP Readings from Last 3 Encounters:   01/29/20 (!) 141/91   01/21/20 170/87   01/15/20 138/70   123/79 today    Wt Readings from Last 3 Encounters:   01/29/20 171 lb (77.6 kg)   01/21/20 173 lb (78.5 kg)   01/15/20 170 lb (77.1 kg)   170 lbs    Lab Results   Component Value Date/Time    Cholesterol, total 244 (H) 01/30/2020 08:47 AM    HDL Cholesterol 75 01/30/2020 08:47 AM    LDL, calculated 156 (H) 01/30/2020 08:47 AM    VLDL, calculated 13 01/30/2020 08:47 AM    Triglyceride 66 01/30/2020 08:47 AM    CHOL/HDL Ratio 2.8 05/29/2019 03:29 AM   On Lipitor since-compliant, no side effects      BONE DENSITY STUDY - CENTRAL2/5/2020  [facility]  Result Impression     1.  BMD MEASURES CONSISTENT WITH OSTEOPOROSIS. 2.  COMPARED TO THE PRECEDING STUDY, BMD HAS DECREASED. 3.  COMPARED TO BASELINE STUDY, BMD IS WITHOUT STATISTICALLY SIGNIFICANT INTERVAL CHANGE.    On Fosamax-not taking, made her chest hurt  Takes OTC Ca+D-wants to try this for 6 months (from [facility])      Allergies-no longer taking Singulair and doing ok      Lab Results   Component Value Date/Time    Hemoglobin A1c 6.0 05/29/2019 03:29 AM     Lab Results   Component Value Date/Time    Glucose 92 01/30/2020 08:47 AM    Glucose (POC) 111 (H) 05/28/2019 08:35 PM       Prior to Admission medications    Medication Sig Start Date End Date Taking? Authorizing Provider   atorvastatin (LIPITOR) 10 mg tablet TAKE 1 TABLET BY MOUTH DAILY 7/29/20  Yes Madhuri Delgado MD   amLODIPine (NORVASC) 5 mg tablet Take 1 Tab by mouth daily. 3/17/20  Yes Madhuri Delgado MD   betamethasone dipropionate (DIPROSONE) 0.05 % topical cream Apply  to affected area two (2) times a day. 1/15/20  Yes Angelito Peres MD   aspirin 81 mg chewable tablet Take 1 Tab by mouth daily. 5/30/19  Yes Christina Griffith MD   Calcium-Cholecalciferol, D3, (CALCIUM 600 WITH VITAMIN D3) 600 mg(1,500mg) -400 unit chew Take 1 Tab by mouth daily. Yes Provider, Historical   cyanocobalamin, vitamin B-12, 500 mcg TbDi 1 Tab by SubLINGual route daily. Yes Provider, Historical   fish oil-dha-epa 1,200-144-216 mg cap Take 2 Caps by mouth two (2) times a day. Yes Provider, Historical   co-enzyme Q-10 (CO Q-10) 100 mg capsule Take 100 mg by mouth daily. Yes Provider, Historical   alendronate (FOSAMAX) 70 mg tablet Take 1 Tab by mouth every seven (7) days. 2/14/20 8/18/20  Angelito Peres MD   montelukast (SINGULAIR) 10 mg tablet Take 10 mg by mouth daily. 8/18/20  Provider, Historical   cefUROXime (CEFTIN) 250 mg tablet Take 250 mg by mouth two (2) times a day.   8/18/20  Provider, Historical     Patient Active Problem List    Diagnosis Date Noted    Essential hypertension 08/18/2020    Age-related osteoporosis without current pathological fracture 08/18/2020    Severe obesity ([address] 10/30/2018    Hyperlipidemia 05/28/2018    Pre-diabetes 05/28/2018    Family history of CVA 11/01/2016    Primary osteoarthritis of left hip 08/16/2016    Keratinous cyst-left neck 11/18/2015    Subcutaneous mass-lt/ neck 09/17/2015    Bursitis of shoulder, left 05/15/2014       ROS  Pt denies: Wt loss, Fever/Chills, HA, Visual changes, Fatigue, Chest pain, SOB, COPELAND, Abd pain, N/V/D/C, Blood in stool or urine, Edema. Pertinent positive as above in HPI. All others were negative  Objective:     Patient-Reported Vitals 8/18/2020   Patient-Reported Weight 170 lb   Patient-Reported Systolic  228   Patient-Reported Diastolic 79      General: alert, cooperative, no distress   Mental  status: normal mood, behavior, speech, dress, motor activity, and thought processes, able to follow commands   HENT: NCAT   Neck: no visualized mass   Resp: no respiratory distress   Neuro: no gross deficits   Skin: no discoloration or lesions of concern on visible areas   Psychiatric: normal affect, consistent with stated mood, no evidence of hallucinations     Additional exam findings: obese      We discussed the expected course, resolution and complications of the diagnosis(es) in detail. Medication risks, benefits, costs, interactions, and alternatives were discussed as indicated. I advised her to contact the office if her condition worsens, changes or fails to improve as anticipated. She expressed understanding with the diagnosis(es) and plan. Jimy Cotto, who was evaluated through a patient-initiated, synchronous (real-time) audio-video encounter, and/or her healthcare decision maker, is aware that it is a billable service, with coverage as determined by her insurance carrier. She provided verbal consent to proceed: Yes, and patient identification was verified.  It was conducted pursuant to the emergency declaration under the [facility] waiver authority and the [employer] Appropriations Act. A caregiver was present when appropriate. Ability to conduct physical exam was limited. I was at home. The patient was at home.       Mari Russell MD

## 2020-08-18 NOTE — PATIENT INSTRUCTIONS
DASH Diet: Care Instructions Your Care Instructions The DASH diet is an eating plan that can help lower your blood pressure. DASH stands for Dietary Approaches to Stop Hypertension. Hypertension is high blood pressure. The DASH diet focuses on eating foods that are high in calcium, potassium, and magnesium. These nutrients can lower blood pressure. The foods that are highest in these nutrients are fruits, vegetables, low-fat dairy products, nuts, seeds, and legumes. But taking calcium, potassium, and magnesium supplements instead of eating foods that are high in those nutrients does not have the same effect. The DASH diet also includes whole grains, fish, and poultry. The DASH diet is one of several lifestyle changes your doctor may recommend to lower your high blood pressure. Your doctor may also want you to decrease the amount of sodium in your diet. Lowering sodium while following the DASH diet can lower blood pressure even further than just the DASH diet alone. Follow-up care is a key part of your treatment and safety. Be sure to make and go to all appointments, and call your doctor if you are having problems. It's also a good idea to know your test results and keep a list of the medicines you take. How can you care for yourself at home? Following the DASH diet · Eat 4 to 5 servings of fruit each day. A serving is 1 medium-sized piece of fruit, ½ cup chopped or canned fruit, 1/4 cup dried fruit, or 4 ounces (½ cup) of fruit juice. Choose fruit more often than fruit juice. · Eat 4 to 5 servings of vegetables each day. A serving is 1 cup of lettuce or raw leafy vegetables, ½ cup of chopped or cooked vegetables, or 4 ounces (½ cup) of vegetable juice. Choose vegetables more often than vegetable juice. · Get 2 to 3 servings of low-fat and fat-free dairy each day. A serving is 8 ounces of milk, 1 cup of yogurt, or 1 ½ ounces of cheese. · Eat 6 to 8 servings of grains each day. A serving is 1 slice of bread, 1 ounce of dry cereal, or ½ cup of cooked rice, pasta, or cooked cereal. Try to choose whole-grain products as much as possible. · Limit lean meat, poultry, and fish to 2 servings each day. A serving is 3 ounces, about the size of a deck of cards. · Eat 4 to 5 servings of nuts, seeds, and legumes (cooked dried beans, lentils, and split peas) each week. A serving is 1/3 cup of nuts, 2 tablespoons of seeds, or ½ cup of cooked beans or peas. · Limit fats and oils to 2 to 3 servings each day. A serving is 1 teaspoon of vegetable oil or 2 tablespoons of salad dressing. · Limit sweets and added sugars to 5 servings or less a week. A serving is 1 tablespoon jelly or jam, ½ cup sorbet, or 1 cup of lemonade. · Eat less than 2,300 milligrams (mg) of sodium a day. If you limit your sodium to 1,500 mg a day, you can lower your blood pressure even more. Tips for success · Start small. Do not try to make dramatic changes to your diet all at once. You might feel that you are missing out on your favorite foods and then be more likely to not follow the plan. Make small changes, and stick with them. Once those changes become habit, add a few more changes. · Try some of the following: ? Make it a goal to eat a fruit or vegetable at every meal and at snacks. This will make it easy to get the recommended amount of fruits and vegetables each day. ? Try yogurt topped with fruit and nuts for a snack or healthy dessert. ? Add lettuce, tomato, cucumber, and onion to sandwiches. ? Combine a ready-made pizza crust with low-fat mozzarella cheese and lots of vegetable toppings. Try using tomatoes, squash, spinach, broccoli, carrots, cauliflower, and onions. ? Have a variety of cut-up vegetables with a low-fat dip as an appetizer instead of chips and dip. ? Sprinkle sunflower seeds or chopped almonds over salads.  Or try adding chopped walnuts or almonds to cooked vegetables. ? Try some vegetarian meals using beans and peas. Add garbanzo or kidney beans to salads. Make burritos and tacos with mashed june beans or black beans. Where can you learn more? Go to http://www.gray.com/ Enter Z332 in the search box to learn more about \"DASH Diet: Care Instructions. \" Current as of: December 16, 2019               Content Version: 12.5 © 0765-8227 [employer]. Care instructions adapted under license by [employer] (which disclaims liability or warranty for this information). If you have questions about a medical condition or this instruction, always ask your healthcare professional. [address] any warranty or liability for your use of this information.

## 2020-08-18 NOTE — PROGRESS NOTES
Chief Complaint   Patient presents with    Follow-up     6 month; Cholesterol, Prediabetes     1. Have you been to the ER, urgent care clinic since your last visit? Hospitalized since your last visit? No    2. Have you seen or consulted any other health care providers outside of the [address] [place] [address] since your last visit? Include any pap smears or colon screening.  Yes Where: Cardiology visit, EKG

## 2020-09-16 ENCOUNTER — CLINICAL SUPPORT (OUTPATIENT)
Dept: FAMILY MEDICINE CLINIC | Age: 65
End: 2020-09-16

## 2020-09-16 DIAGNOSIS — Z23 NEEDS FLU SHOT: Primary | ICD-10-CM

## 2020-09-16 NOTE — PROGRESS NOTES
Patient presents for influenza vaccine injection. Consent given to patient and signed. Area prepped and injection given in left deltoid. Patient tolerated injection well. Patient observed for 10 minutes post injection, with no signs nor symptoms of adverse reaction noted.

## 2020-09-17 LAB
ALBUMIN SERPL-MCNC: 4.5 G/DL (ref 3.8–4.8)
ALBUMIN/GLOB SERPL: 1.7 {RATIO} (ref 1.2–2.2)
ALP SERPL-CCNC: 77 IU/L (ref 39–117)
ALT SERPL-CCNC: 16 IU/L (ref 0–32)
AST SERPL-CCNC: 14 IU/L (ref 0–40)
BASOPHILS # BLD AUTO: 0 X10E3/UL (ref 0–0.2)
BASOPHILS NFR BLD AUTO: 1 %
BILIRUB SERPL-MCNC: 0.3 MG/DL (ref 0–1.2)
BUN SERPL-MCNC: 14 MG/DL (ref 8–27)
BUN/CREAT SERPL: 14 (ref 12–28)
CALCIUM SERPL-MCNC: 9.2 MG/DL (ref 8.7–10.3)
CHLORIDE SERPL-SCNC: 104 MMOL/L (ref 96–106)
CHOLEST SERPL-MCNC: 159 MG/DL (ref 100–199)
CO2 SERPL-SCNC: 25 MMOL/L (ref 20–29)
CREAT SERPL-MCNC: 0.98 MG/DL (ref 0.57–1)
EOSINOPHIL # BLD AUTO: 0.1 X10E3/UL (ref 0–0.4)
EOSINOPHIL NFR BLD AUTO: 3 %
ERYTHROCYTE [DISTWIDTH] IN BLOOD BY AUTOMATED COUNT: 14.6 % (ref 11.7–15.4)
GLOBULIN SER CALC-MCNC: 2.6 G/DL (ref 1.5–4.5)
GLUCOSE SERPL-MCNC: 100 MG/DL (ref 65–99)
HBA1C MFR BLD: 6.1 % (ref 4.8–5.6)
HCT VFR BLD AUTO: 43.2 % (ref 34–46.6)
HDLC SERPL-MCNC: 61 MG/DL
HGB BLD-MCNC: 14.1 G/DL (ref 11.1–15.9)
IMM GRANULOCYTES # BLD AUTO: 0 X10E3/UL (ref 0–0.1)
IMM GRANULOCYTES NFR BLD AUTO: 0 %
INTERPRETATION, 910389: NORMAL
LDLC SERPL CALC-MCNC: 85 MG/DL (ref 0–99)
LYMPHOCYTES # BLD AUTO: 1.9 X10E3/UL (ref 0.7–3.1)
LYMPHOCYTES NFR BLD AUTO: 45 %
MCH RBC QN AUTO: 25.7 PG (ref 26.6–33)
MCHC RBC AUTO-ENTMCNC: 32.6 G/DL (ref 31.5–35.7)
MCV RBC AUTO: 79 FL (ref 79–97)
MONOCYTES # BLD AUTO: 0.3 X10E3/UL (ref 0.1–0.9)
MONOCYTES NFR BLD AUTO: 7 %
NEUTROPHILS # BLD AUTO: 1.8 X10E3/UL (ref 1.4–7)
NEUTROPHILS NFR BLD AUTO: 44 %
PLATELET # BLD AUTO: 178 X10E3/UL (ref 150–450)
POTASSIUM SERPL-SCNC: 4.4 MMOL/L (ref 3.5–5.2)
PROT SERPL-MCNC: 7.1 G/DL (ref 6–8.5)
RBC # BLD AUTO: 5.48 X10E6/UL (ref 3.77–5.28)
SODIUM SERPL-SCNC: 142 MMOL/L (ref 134–144)
TRIGL SERPL-MCNC: 69 MG/DL (ref 0–149)
VLDLC SERPL CALC-MCNC: 13 MG/DL (ref 5–40)
WBC # BLD AUTO: 4.1 X10E3/UL (ref 3.4–10.8)

## 2020-11-09 ENCOUNTER — VIRTUAL VISIT (OUTPATIENT)
Dept: FAMILY MEDICINE CLINIC | Age: 65
End: 2020-11-09
Payer: MEDICARE

## 2020-11-09 DIAGNOSIS — E78.5 HYPERLIPIDEMIA, UNSPECIFIED HYPERLIPIDEMIA TYPE: ICD-10-CM

## 2020-11-09 DIAGNOSIS — R21 RASH: ICD-10-CM

## 2020-11-09 DIAGNOSIS — Z86.19 HISTORY OF SHINGLES: ICD-10-CM

## 2020-11-09 DIAGNOSIS — R73.01 IMPAIRED FASTING GLUCOSE: ICD-10-CM

## 2020-11-09 DIAGNOSIS — I10 ESSENTIAL HYPERTENSION: Primary | ICD-10-CM

## 2020-11-09 PROCEDURE — 1090F PRES/ABSN URINE INCON ASSESS: CPT | Performed by: INTERNAL MEDICINE

## 2020-11-09 PROCEDURE — G8510 SCR DEP NEG, NO PLAN REQD: HCPCS | Performed by: INTERNAL MEDICINE

## 2020-11-09 PROCEDURE — G8756 NO BP MEASURE DOC: HCPCS | Performed by: INTERNAL MEDICINE

## 2020-11-09 PROCEDURE — 3017F COLORECTAL CA SCREEN DOC REV: CPT | Performed by: INTERNAL MEDICINE

## 2020-11-09 PROCEDURE — G8427 DOCREV CUR MEDS BY ELIG CLIN: HCPCS | Performed by: INTERNAL MEDICINE

## 2020-11-09 PROCEDURE — 1101F PT FALLS ASSESS-DOCD LE1/YR: CPT | Performed by: INTERNAL MEDICINE

## 2020-11-09 PROCEDURE — G9899 SCRN MAM PERF RSLTS DOC: HCPCS | Performed by: INTERNAL MEDICINE

## 2020-11-09 PROCEDURE — 99214 OFFICE O/P EST MOD 30 MIN: CPT | Performed by: INTERNAL MEDICINE

## 2020-11-09 RX ORDER — BETAMETHASONE DIPROPIONATE 0.5 MG/G
CREAM TOPICAL 2 TIMES DAILY
Qty: 45 G | Refills: 0 | Status: SHIPPED | OUTPATIENT
Start: 2020-11-09 | End: 2021-03-11

## 2020-11-09 NOTE — PROGRESS NOTES
Rossana Morel is a 71 y.o. female who was seen by synchronous (real-time) audio-video technology on 11/9/2020 for Rash (all over x 2 weeks ) and Follow Up Chronic Condition        Assessment & Plan:   Diagnoses and all orders for this visit:    1. Essential hypertension-currently off meds (Norvasc) as it is thought to be the culprit of her rash; advised on low sodium diet and home monitoring and to let me know if BP is above 140/90 on at least 3 separate occasions; also has a follow up with Cardio in December    2. Rash-?from Norvasc; will refill Diprosone which she thinks was more effective for her neck rash in the past  -     betamethasone dipropionate (DIPROSONE) 0.05 % topical cream; Apply  to affected area two (2) times a day. 3. Hyperlipidemia, unspecified hyperlipidemia type-doing well on Lipitor    4. Impaired fasting glucose-?side effect of the statin but she also has strong family hx of DM, recheck A1C in 3 months    5. History of shingles-advised to wait at least 6 months after before taking the shingles vaccine      Follow-up and Dispositions    · Return in about 3 months (around 2/9/2021) for follow up, fasting labs.          712  Subjective:     Health Maintenance Due   Topic Date Due    Shingrix Vaccine Age 49> (1 of 2) 12/12/2004    GLAUCOMA SCREENING Q2Y  12/12/2019     BP Readings from Last 3 Encounters:   01/29/20 (!) 141/91   01/21/20 170/87   01/15/20 138/70   takes Norvasc prn per Cardio    Lab Results   Component Value Date/Time    Cholesterol, total 159 09/16/2020 08:37 AM    HDL Cholesterol 61 09/16/2020 08:37 AM    LDL, calculated 156 (H) 01/30/2020 08:47 AM    LDL Chol Calc (NIH) 85 09/16/2020 08:37 AM    VLDL, calculated 13 01/30/2020 08:47 AM    VLDL Cholesterol Andre 13 09/16/2020 08:37 AM    Triglyceride 69 09/16/2020 08:37 AM    CHOL/HDL Ratio 2.8 05/29/2019 03:29 AM   On Lipitor-needs refill    [facility]ent to [facility]-itchy everywhere with rash, neck rash is worse  Given Triamcinolone cream but still itching; Hydroxyzine  UTI -none in cultutre but finished antibiotics    Needs refill for cream of rash on neck    Strong fam hx of DM  Lab Results   Component Value Date/Time    Hemoglobin A1c 6.1 (H) 09/16/2020 08:37 AM       [facility]-wait 6 months to get vaccine  Prior to Admission medications    Medication Sig Start Date End Date Taking? Authorizing Provider   atorvastatin (LIPITOR) 10 mg tablet TAKE 1 TABLET BY MOUTH DAILY 7/29/20  Yes Madhuri Delgado MD   betamethasone dipropionate (DIPROSONE) 0.05 % topical cream Apply  to affected area two (2) times a day. 1/15/20  Yes Loida Archer MD   aspirin 81 mg chewable tablet Take 1 Tab by mouth daily. 5/30/19  Yes Stefani Mora MD   Calcium-Cholecalciferol, D3, (CALCIUM 600 WITH VITAMIN D3) 600 mg(1,500mg) -400 unit chew Take 1 Tab by mouth daily. Yes Provider, Historical   cyanocobalamin, vitamin B-12, 500 mcg TbDi 1 Tab by SubLINGual route daily. Yes Provider, Historical   fish oil-dha-epa 1,200-144-216 mg cap Take 2 Caps by mouth two (2) times a day. Yes Provider, Historical   co-enzyme Q-10 (CO Q-10) 100 mg capsule Take 100 mg by mouth daily. Yes Provider, Historical   amLODIPine (NORVASC) 5 mg tablet Take 1 Tab by mouth daily.  3/17/20 11/9/20  Loida Archer MD     Patient Active Problem List    Diagnosis Date Noted    Impaired fasting glucose 11/09/2020    Essential hypertension 08/18/2020    Age-related osteoporosis without current pathological fracture 08/18/2020    Severe obesity ([facility] [address] 10/30/2018    Hyperlipidemia 05/28/2018    Family history of CVA 11/01/2016    Primary osteoarthritis of left hip 08/16/2016    Keratinous cyst-left neck 11/18/2015    Subcutaneous mass-lt/ neck 09/17/2015    Bursitis of shoulder, left 05/15/2014       ROS  Pt denies: Wt loss, Fever/Chills, HA, Visual changes, Fatigue, Chest pain, SOB, COPELAND, Abd pain, N/V/D/C, Blood in stool or urine, Edema. Pertinent positive as above in HPI. All others were negative  Objective:     Patient-Reported Vitals 8/18/2020   Patient-Reported Weight 170 lb   Patient-Reported Systolic  826   Patient-Reported Diastolic 79      General: alert, cooperative, no distress   Mental  status: normal mood, behavior, speech, dress, motor activity, and thought processes, able to follow commands   HENT: NCAT   Neck: no visualized mass   Resp: no respiratory distress   Neuro: no gross deficits   Skin: Discoloration noted on the left neck area   Psychiatric: normal affect, consistent with stated mood, no evidence of hallucinations     Additional exam findings: obese      We discussed the expected course, resolution and complications of the diagnosis(es) in detail. Medication risks, benefits, costs, interactions, and alternatives were discussed as indicated. I advised her to contact the office if her condition worsens, changes or fails to improve as anticipated. She expressed understanding with the diagnosis(es) and plan. Johnathan Greene, who was evaluated through a patient-initiated, synchronous (real-time) audio-video encounter, and/or her healthcare decision maker, is aware that it is a billable service, with coverage as determined by her insurance carrier. She provided verbal consent to proceed: Yes, and patient identification was verified. It was conducted pursuant to the emergency declaration under the [address] [place] [address] authority and the [employer] and [email or web address] General Act. A caregiver was present when appropriate. Ability to conduct physical exam was limited. I was at home. The patient was at home.       Aquiles Martínez MD

## 2020-11-09 NOTE — PATIENT INSTRUCTIONS

## 2020-11-09 NOTE — PROGRESS NOTES
Chief Complaint   Patient presents with    Rash     all over x 2 weeks        1. Have you been to the ER, urgent care clinic since your last visit? Hospitalized since your last visit? [facility] [place] x 3 days     2. Have you seen or consulted any other health care providers outside of the [address] [place] [address] since your last visit? Include any pap smears or colon screening.  no

## 2020-11-30 DIAGNOSIS — I10 ESSENTIAL HYPERTENSION: Primary | ICD-10-CM

## 2020-11-30 RX ORDER — LOSARTAN POTASSIUM 50 MG/1
50 TABLET ORAL DAILY
Qty: 30 TAB | Refills: 3 | Status: SHIPPED | OUTPATIENT
Start: 2020-11-30 | End: 2021-03-11

## 2021-03-11 ENCOUNTER — OFFICE VISIT (OUTPATIENT)
Dept: FAMILY MEDICINE CLINIC | Age: 66
End: 2021-03-11
Payer: MEDICARE

## 2021-03-11 VITALS
TEMPERATURE: 97.5 F | DIASTOLIC BLOOD PRESSURE: 81 MMHG | WEIGHT: 173.8 LBS | OXYGEN SATURATION: 99 % | HEIGHT: 59 IN | HEART RATE: 66 BPM | RESPIRATION RATE: 17 BRPM | SYSTOLIC BLOOD PRESSURE: 150 MMHG | BODY MASS INDEX: 35.04 KG/M2

## 2021-03-11 DIAGNOSIS — R73.01 IMPAIRED FASTING GLUCOSE: ICD-10-CM

## 2021-03-11 DIAGNOSIS — Z00.00 MEDICARE ANNUAL WELLNESS VISIT, SUBSEQUENT: Primary | ICD-10-CM

## 2021-03-11 DIAGNOSIS — Z71.89 ADVANCED DIRECTIVES, COUNSELING/DISCUSSION: ICD-10-CM

## 2021-03-11 DIAGNOSIS — Z12.31 ENCOUNTER FOR SCREENING MAMMOGRAM FOR MALIGNANT NEOPLASM OF BREAST: ICD-10-CM

## 2021-03-11 DIAGNOSIS — I10 ESSENTIAL HYPERTENSION: ICD-10-CM

## 2021-03-11 DIAGNOSIS — E78.5 HYPERLIPIDEMIA, UNSPECIFIED HYPERLIPIDEMIA TYPE: ICD-10-CM

## 2021-03-11 DIAGNOSIS — E55.9 VITAMIN D DEFICIENCY: ICD-10-CM

## 2021-03-11 DIAGNOSIS — L81.9 PIGMENTED SKIN LESIONS: ICD-10-CM

## 2021-03-11 DIAGNOSIS — E66.01 SEVERE OBESITY (HCC): ICD-10-CM

## 2021-03-11 DIAGNOSIS — L29.9 PRURITUS: ICD-10-CM

## 2021-03-11 DIAGNOSIS — M81.0 AGE-RELATED OSTEOPOROSIS WITHOUT CURRENT PATHOLOGICAL FRACTURE: ICD-10-CM

## 2021-03-11 PROCEDURE — G8510 SCR DEP NEG, NO PLAN REQD: HCPCS | Performed by: INTERNAL MEDICINE

## 2021-03-11 PROCEDURE — G9899 SCRN MAM PERF RSLTS DOC: HCPCS | Performed by: INTERNAL MEDICINE

## 2021-03-11 PROCEDURE — 1090F PRES/ABSN URINE INCON ASSESS: CPT | Performed by: INTERNAL MEDICINE

## 2021-03-11 PROCEDURE — G8753 SYS BP > OR = 140: HCPCS | Performed by: INTERNAL MEDICINE

## 2021-03-11 PROCEDURE — 1101F PT FALLS ASSESS-DOCD LE1/YR: CPT | Performed by: INTERNAL MEDICINE

## 2021-03-11 PROCEDURE — G8754 DIAS BP LESS 90: HCPCS | Performed by: INTERNAL MEDICINE

## 2021-03-11 PROCEDURE — 99214 OFFICE O/P EST MOD 30 MIN: CPT | Performed by: INTERNAL MEDICINE

## 2021-03-11 PROCEDURE — G8427 DOCREV CUR MEDS BY ELIG CLIN: HCPCS | Performed by: INTERNAL MEDICINE

## 2021-03-11 PROCEDURE — 3017F COLORECTAL CA SCREEN DOC REV: CPT | Performed by: INTERNAL MEDICINE

## 2021-03-11 PROCEDURE — G8417 CALC BMI ABV UP PARAM F/U: HCPCS | Performed by: INTERNAL MEDICINE

## 2021-03-11 PROCEDURE — G0439 PPPS, SUBSEQ VISIT: HCPCS | Performed by: INTERNAL MEDICINE

## 2021-03-11 PROCEDURE — G8536 NO DOC ELDER MAL SCRN: HCPCS | Performed by: INTERNAL MEDICINE

## 2021-03-11 NOTE — PROGRESS NOTES
Chief Complaint   Patient presents with    Annual Wellness Visit    Follow Up Chronic Condition     3 month     Patient stopped BP medication because of itching. Patient states solve BP with diet. 1. Have you been to the ER, urgent care clinic since your last visit? Hospitalized since your last visit? No    2. Have you seen or consulted any other health care providers outside of the [address] [place] [address] since your last visit? Include any pap smears or colon screening.  No     Health Maintenance Due   Topic Date Due    COVID-19 Vaccine (1 of 2) Never done    Shingrix Vaccine Age 50> (1 of 2) Never done    GLAUCOMA SCREENING Q2Y  Never done    Medicare Yearly Exam  01/15/2021    Pneumococcal 65+ years (2 of 2 - PPSV23) 01/15/2021

## 2021-03-11 NOTE — ACP (ADVANCE CARE PLANNING)
Advance Care Planning     Advance Care Planning (ACP) Physician/NP/PA Conversation      Date of Conversation: 3/11/2021  Conducted with: Patient with Decision Making Capacity    Healthcare Decision Maker:     Primary Decision Maker: Taylor Sloan - Daughter - [phone]    Secondary Decision Maker: Eduardo Harper - Daughter - [phone]  Click here to complete [address] including selection of the Healthcare Decision Maker Relationship (ie \"Primary\")    Care Preferences:    Hospitalization: \"If your health worsens and it becomes clear that your chance of recovery is unlikely, what would be your preference regarding hospitalization? \"  The patient would prefer comfort-focused treatment without hospitalization. Ventilation: \"If you were unable to breathe on your own and your chance of recovery was unlikely, what would be your preference about the use of a ventilator (breathing machine) if it was available to you? \"   The patient would desire the use of a ventilator. Resuscitation: \"In the event your heart stopped as a result of an underlying serious health condition, would you want attempts to be made to restart your heart, or would you prefer a natural death? \"   Yes, attempt to resuscitate.     Additional topics discussed: end of life care preferences (vegetative state/imminent death)    Conversation Outcomes / Follow-Up Plan:   ACP complete - no further action today  Reviewed DNR/DNI and patient elects Full Code (Attempt Resuscitation)     Length of Voluntary ACP Conversation in minutes:  <16 minutes (Non-Billable)    Maude Worley MD

## 2021-03-11 NOTE — PROGRESS NOTES
Chief Complaint   Patient presents with    Annual Wellness Visit    Follow Up Chronic Condition     3 month       Pt is a 76y.o. year old female who presents for follow up of her chronic medical problems    BP Readings from Last 3 Encounters:   03/11/21 (!) 150/81   01/29/20 (!) 141/91   01/21/20 170/87   repeat BP-stillelevated  Home BP-123/73  Stopped meds-want to go \"all natural\"  BP meds made her itchy (both Norvasc and Losartan)    Lab Results   Component Value Date/Time    Cholesterol, total 211 (H) 03/11/2021 11:22 AM    HDL Cholesterol 61 03/11/2021 11:22 AM    LDL, calculated 138 (H) 03/11/2021 11:22 AM    LDL, calculated 156 (H) 01/30/2020 08:47 AM    VLDL, calculated 12 03/11/2021 11:22 AM    VLDL, calculated 13 01/30/2020 08:47 AM    Triglyceride 65 03/11/2021 11:22 AM    CHOL/HDL Ratio 2.8 05/29/2019 03:29 AM   ran out of Lipitor and did not refill  No side effects so willing to re-try  From       Just wants to do it with diet-stopped eating red meat, low salt and exercise regularly  Retired from job so less stress    Wt Readings from Last 3 Encounters:   03/11/21 173 lb 12.8 oz (78.8 kg)   01/29/20 171 lb (77.6 kg)   01/21/20 173 lb (78.5 kg)     BONE DENSITY STUDY - CENTRAL2/5/2020  [facility]  Result Impression     1.  BMD MEASURES CONSISTENT WITH OSTEOPOROSIS. 2.  COMPARED TO THE PRECEDING STUDY, BMD HAS DECREASED. 3.  COMPARED TO BASELINE STUDY, BMD IS WITHOUT STATISTICALLY SIGNIFICANT INTERVAL CHANGE. Taking the Algae álvaro-thought to make a difference in just 6 months  Declined to take meds for osteoporosis as well    Hyperplastic polyp in 2012-repeat colonoscopy next year    [facility]    Wants to see Derm-itching all over but no rash; black spots on the face are more than usual    ROS:    Pt denies: Wt loss, Fever/Chills, HA, Visual changes, Fatigue, Chest pain, SOB, COPELAND, Abd pain, N/V/D/C, Blood in stool or urine, Edema. Pertinent positive as above in HPI.  All others were negative    Patient Active Problem List   Diagnosis Code    Bursitis of shoulder, left M75.52    Subcutaneous mass-lt/ neck R22.9    Keratinous cyst-left neck L72.0    Primary osteoarthritis of left hip M16.12    Family history of CVA Z82.3    Hyperlipidemia E78.5    Severe obesity ([address] E66.01    Essential hypertension I10    Age-related osteoporosis without current pathological fracture M81.0    Impaired fasting glucose R73.01       Past Medical History:   Diagnosis Date    Arthritis     Diverticulitis        Current Outpatient Medications   Medication Sig Dispense Refill    aspirin 81 mg chewable tablet Take 1 Tab by mouth daily. 30 Tab 0    Calcium-Cholecalciferol, D3, (CALCIUM 600 WITH VITAMIN D3) 600 mg(1,500mg) -400 unit chew Take 1 Tab by mouth daily.  cyanocobalamin, vitamin B-12, 500 mcg TbDi 1 Tab by SubLINGual route daily.  fish oil-dha-epa 1,200-144-216 mg cap Take 2 Caps by mouth two (2) times a day.  co-enzyme Q-10 (CO Q-10) 100 mg capsule Take 100 mg by mouth daily. Social History     Tobacco Use   Smoking Status Never Smoker   Smokeless Tobacco Never Used       Allergies   Allergen Reactions    Latex Rash    Naprosyn [Naproxen] Itching    Norvasc [Amlodipine] Rash    Strawberry Itching and Swelling     Per pt report       Patient Labs were reviewed: yes      Patient Past Records were reviewed:  yes        Objective:     Vitals:    03/11/21 1059 03/11/21 1142   BP: (!) 153/80 (!) 150/81   Pulse: 72 66   Resp: 17    Temp: 97.5 °F (36.4 °C)    TempSrc: Temporal    SpO2: 99%    Weight: 173 lb 12.8 oz (78.8 kg)    Height: 4' 11\" (1.499 m)      Body mass index is 35.1 kg/m². Exam:   Appearance: alert, well appearing,  oriented to person, place, and time, acyanotic, in no respiratory distress and well hydrated.   HEENT:  NC/AT, pink conj, anicteric sclerae  Neck:  No cervical lymphadenopathy, no JVD, no thyromegaly, no carotid bruit  Heart:  RRR without M/R/G  Lungs:  CTAB, no rhonchi, rales, or wheezes with good air exchange   Abdomen:  Non-tender, pos bowel sounds, no hepatosplenomegaly  Ext:  No C/C/E    Skin: no rash, dry  Neuro: no lateralizing signs, CNs II-XII intact      Assessment/ Plan:   Diagnoses and all orders for this visit:    1. Medicare annual wellness visit, subsequent-see note below    2. Hyperlipidemia, unspecified hyperlipidemia type -willing to re-try statin if lipids are trending up  -     LIPID PANEL; Future    3. Essential hypertension-uncontrolled but refuses to take meds because of previous side effects; continue with low sodium diet and home monitoring  -     CBC WITH AUTOMATED DIFF; Future  -     METABOLIC PANEL, COMPREHENSIVE; Future    4. Impaired fasting glucose-advised to dec carbs in diet  -     HEMOGLOBIN A1C WITH EAG; Future    5. Age-related osteoporosis without current pathological fracture-declined treatment; continue with daily CA+D, walk or exercise    6. Vitamin D deficiency  -     VITAMIN D, 25 HYDROXY; Future    7. Severe obesity (HCC)-discussed limiting álvaro to 4920-9364/day and exercising at least 150 min a week  -     TSH 3RD GENERATION; Future    8. Pruritus  -     REFERRAL TO DERMATOLOGY    9. Pigmented skin lesions  -     REFERRAL TO DERMATOLOGY            Follow-up and Dispositions    · Return in about 3 months (around 6/11/2021) for follow up. I have discussed the diagnosis with the patient and the intended plan as seen in the above orders. The patient has received an After-Visit Summary and questions were answered concerning future plans. Medication Side Effects and Warnings were discussed with patient: yes    Patient verbalized understanding of above instructions.     Rubens Henderson MD  Internal Medicine  [facility]    This is the Subsequent Medicare Annual Wellness Exam, performed 12 months or more after the Initial AWV or the last Subsequent AWV    I have reviewed the patient's medical history in detail and updated the computerized patient record. Depression Risk Factor Screening:     3 most recent PHQ Screens 3/11/2021   Little interest or pleasure in doing things Several days   Feeling down, depressed, irritable, or hopeless Several days   Total Score PHQ 2 2       Alcohol Risk Screen    Do you average more than 1 drink per night or more than 7 drinks a week:  No    On any one occasion in the past three months have you have had more than 3 drinks containing alcohol:  No        Functional Ability and Level of Safety:    Hearing: Hearing is good. Activities of Daily Living: The home contains: no safety equipment. Patient does total self care      Ambulation: with no difficulty     Fall Risk:  Fall Risk Assessment, last 12 mths 3/11/2021   Able to walk? Yes   Fall in past 12 months? 0   Number of falls in past 12 months -   Fall with injury? -      Abuse Screen:  Patient is not abused       Cognitive Screening    Has your family/caregiver stated any concerns about your memory: no     Cognitive Screening: Normal - not needed    Assessment/Plan   Education and counseling provided:  Are appropriate based on today's review and evaluation  End-of-Life planning (with patient's consent)-see ACP note  Pneumococcal Vaccine-advised to get Pneumovax 23 at her pharmacy or here next visit  Influenza Vaccine-done  Screening Mammography-up to date, done 2/2020-ordered  Colorectal cancer screening tests-up to date  Cardiovascular screening blood test-fasting lipids today  Bone mass measurement (DEXA)-due next year, done 2/2020  Screening for glaucoma-eye exam is up to date  Diabetes screening test-A1C today    Diagnoses and all orders for this visit:    1. Medicare annual wellness visit, subsequent-Refer to above for plan and to patient instructions for recommendations on HM    2.  Encounter for screening mammogram for malignant neoplasm of breast  -     LENCHO MAMMO BI SCREENING INCL CAD; Future    3. Advanced directives, counseling/discussion        RTC yearly for wellness visit    Health Maintenance Due     Health Maintenance Due   Topic Date Due    COVID-19 Vaccine (1) Never done    Shingrix Vaccine Age 50> (1 of 2)-advised to get this at her pharmacy Never done    Pneumococcal 65+ years (2 of 2 - PPSV23) 01/15/2021       Patient Care Team   Patient Care Team:  Feli Garza MD as PCP - General (Internal Medicine)  Feli Garza MD as PCP - [facility]  Cassie Laura MD (General Surgery)    History     Patient Active Problem List   Diagnosis Code    Bursitis of shoulder, left M75.52    Subcutaneous mass-lt/ neck R22.9    Keratinous cyst-left neck L72.0    Primary osteoarthritis of left hip M16.12    Family history of CVA Z82.3    Hyperlipidemia E78.5    Severe obesity ([address] E66.01    Essential hypertension I10    Age-related osteoporosis without current pathological fracture M81.0    Impaired fasting glucose R73.01     Past Medical History:   Diagnosis Date    Arthritis     Diverticulitis       History reviewed. No pertinent surgical history. Current Outpatient Medications   Medication Sig Dispense Refill    aspirin 81 mg chewable tablet Take 1 Tab by mouth daily. 30 Tab 0    Calcium-Cholecalciferol, D3, (CALCIUM 600 WITH VITAMIN D3) 600 mg(1,500mg) -400 unit chew Take 1 Tab by mouth daily.  cyanocobalamin, vitamin B-12, 500 mcg TbDi 1 Tab by SubLINGual route daily.  fish oil-dha-epa 1,200-144-216 mg cap Take 2 Caps by mouth two (2) times a day.  co-enzyme Q-10 (CO Q-10) 100 mg capsule Take 100 mg by mouth daily.        Allergies   Allergen Reactions    Latex Rash    Naprosyn [Naproxen] Itching    Norvasc [Amlodipine] Rash    Strawberry Itching and Swelling     Per pt report       Family History   Problem Relation Age of Onset    Hypertension Mother     Stroke Mother     Breast Cancer Other 35     Social History     Tobacco Use    Smoking status: Never Smoker    Smokeless tobacco: Never Used   Substance Use Topics    Alcohol use: No     Alcohol/week: 0.0 standard drinks

## 2021-03-11 NOTE — PATIENT INSTRUCTIONS
Medicare Wellness Visit, Female The best way to live healthy is to have a lifestyle where you eat a well-balanced diet, exercise regularly, limit alcohol use, and quit all forms of tobacco/nicotine, if applicable. Regular preventive services are another way to keep healthy. Preventive services (vaccines, screening tests, monitoring & exams) can help personalize your care plan, which helps you manage your own care. Screening tests can find health problems at the earliest stages, when they are easiest to treat. [facility] follows the current, evidence-based guidelines published by the [facility] Sm[facility] ([facility]) when recommending preventive services for our patients. Because we follow these guidelines, sometimes recommendations change over time as research supports it. (For example, mammograms used to be recommended annually. Even though Medicare will still pay for an annual mammogram, the newer guidelines recommend a mammogram every two years for women of average risk). Of course, you and your doctor may decide to screen more often for some diseases, based on your risk and your co-morbidities (chronic disease you are already diagnosed with). Preventive services for you include: - Medicare offers their members a free annual wellness visit, which is time for you and your primary care provider to discuss and plan for your preventive service needs. Take advantage of this benefit every year! 
-All adults over the age of 72 should receive the recommended pneumonia vaccines. Current USPSTF guidelines recommend a series of two vaccines for the best pneumonia protection.  
-All adults should have a flu vaccine yearly and a tetanus vaccine every 10 years.  
-All adults age 48 and older should receive the shingles vaccines (series of two vaccines).      
-All adults age 38-68 who are overweight should have a diabetes screening test once every three years.  
-All adults born between [birth year] and 1965 should be screened once for Hepatitis C. 
-Other screening tests and preventive services for persons with diabetes include: an eye exam to screen for diabetic retinopathy, a kidney function test, a foot exam, and stricter control over your cholesterol.  
-Cardiovascular screening for adults with routine risk involves an electrocardiogram (ECG) at intervals determined by your doctor.  
-Colorectal cancer screenings should be done for adults age 54-65 with no increased risk factors for colorectal cancer. There are a number of acceptable methods of screening for this type of cancer. Each test has its own benefits and drawbacks. Discuss with your doctor what is most appropriate for you during your annual wellness visit. The different tests include: colonoscopy (considered the best screening method), a fecal occult blood test, a fecal DNA test, and sigmoidoscopy. 
 
-A bone mass density test is recommended when a woman turns 65 to screen for osteoporosis. This test is only recommended one time, as a screening. Some providers will use this same test as a disease monitoring tool if you already have osteoporosis. -Breast cancer screenings are recommended every other year for women of normal risk, age 54-69. 
-Cervical cancer screenings for women over age 72 are only recommended with certain risk factors. Here is a list of your current Health Maintenance items (your personalized list of preventive services) with a due date: 
Health Maintenance Due Topic Date Due  
 COVID-19 Vaccine (1 of 2) Never done  Shingles Vaccine (1 of 2) Never done  Glaucoma Screening   Never done  Pneumococcal Vaccine (2 of 2 - PPSV23) 01/15/2021

## 2021-03-13 LAB
25(OH)D3+25(OH)D2 SERPL-MCNC: 67.4 NG/ML (ref 30–100)
ALBUMIN SERPL-MCNC: 4 G/DL (ref 3.8–4.8)
ALBUMIN/GLOB SERPL: 1.3 {RATIO} (ref 1.2–2.2)
ALP SERPL-CCNC: 83 IU/L (ref 39–117)
ALT SERPL-CCNC: 14 IU/L (ref 0–32)
AST SERPL-CCNC: 12 IU/L (ref 0–40)
BASOPHILS # BLD AUTO: 0 X10E3/UL (ref 0–0.2)
BASOPHILS NFR BLD AUTO: 1 %
BILIRUB SERPL-MCNC: 0.3 MG/DL (ref 0–1.2)
BUN SERPL-MCNC: 13 MG/DL (ref 8–27)
BUN/CREAT SERPL: 14 (ref 12–28)
CALCIUM SERPL-MCNC: 8.9 MG/DL (ref 8.7–10.3)
CHLORIDE SERPL-SCNC: 105 MMOL/L (ref 96–106)
CHOLEST SERPL-MCNC: 211 MG/DL (ref 100–199)
CO2 SERPL-SCNC: 25 MMOL/L (ref 20–29)
CREAT SERPL-MCNC: 0.9 MG/DL (ref 0.57–1)
EOSINOPHIL # BLD AUTO: 0.2 X10E3/UL (ref 0–0.4)
EOSINOPHIL NFR BLD AUTO: 5 %
ERYTHROCYTE [DISTWIDTH] IN BLOOD BY AUTOMATED COUNT: 14.9 % (ref 11.7–15.4)
EST. AVERAGE GLUCOSE BLD GHB EST-MCNC: 126 MG/DL
GLOBULIN SER CALC-MCNC: 3 G/DL (ref 1.5–4.5)
GLUCOSE SERPL-MCNC: 96 MG/DL (ref 65–99)
HBA1C MFR BLD: 6 % (ref 4.8–5.6)
HCT VFR BLD AUTO: 45.5 % (ref 34–46.6)
HDLC SERPL-MCNC: 61 MG/DL
HGB BLD-MCNC: 14.6 G/DL (ref 11.1–15.9)
IMM GRANULOCYTES # BLD AUTO: 0 X10E3/UL (ref 0–0.1)
IMM GRANULOCYTES NFR BLD AUTO: 0 %
IMP & REVIEW OF LAB RESULTS: NORMAL
LDLC SERPL CALC-MCNC: 138 MG/DL (ref 0–99)
LYMPHOCYTES # BLD AUTO: 1.8 X10E3/UL (ref 0.7–3.1)
LYMPHOCYTES NFR BLD AUTO: 42 %
MCH RBC QN AUTO: 25.6 PG (ref 26.6–33)
MCHC RBC AUTO-ENTMCNC: 32.1 G/DL (ref 31.5–35.7)
MCV RBC AUTO: 80 FL (ref 79–97)
MONOCYTES # BLD AUTO: 0.4 X10E3/UL (ref 0.1–0.9)
MONOCYTES NFR BLD AUTO: 9 %
NEUTROPHILS # BLD AUTO: 1.9 X10E3/UL (ref 1.4–7)
NEUTROPHILS NFR BLD AUTO: 43 %
PLATELET # BLD AUTO: 183 X10E3/UL (ref 150–450)
POTASSIUM SERPL-SCNC: 4.5 MMOL/L (ref 3.5–5.2)
PROT SERPL-MCNC: 7 G/DL (ref 6–8.5)
RBC # BLD AUTO: 5.71 X10E6/UL (ref 3.77–5.28)
SODIUM SERPL-SCNC: 141 MMOL/L (ref 134–144)
TRIGL SERPL-MCNC: 65 MG/DL (ref 0–149)
TSH SERPL DL<=0.005 MIU/L-ACNC: 0.41 UIU/ML (ref 0.45–4.5)
VLDLC SERPL CALC-MCNC: 12 MG/DL (ref 5–40)
WBC # BLD AUTO: 4.4 X10E3/UL (ref 3.4–10.8)

## 2021-03-18 ENCOUNTER — TELEPHONE (OUTPATIENT)
Dept: FAMILY MEDICINE CLINIC | Age: 66
End: 2021-03-18

## 2021-03-18 DIAGNOSIS — E78.5 HYPERLIPIDEMIA, UNSPECIFIED HYPERLIPIDEMIA TYPE: ICD-10-CM

## 2021-03-18 RX ORDER — ATORVASTATIN CALCIUM 10 MG/1
10 TABLET, FILM COATED ORAL DAILY
Qty: 90 TAB | Refills: 1 | Status: SHIPPED | OUTPATIENT
Start: 2021-03-18 | End: 2021-09-22

## 2021-03-29 ENCOUNTER — TELEPHONE (OUTPATIENT)
Dept: FAMILY MEDICINE CLINIC | Age: 66
End: 2021-03-29

## 2021-03-29 RX ORDER — SPIRONOLACTONE 25 MG
1 TABLET ORAL DAILY
Status: CANCELLED | OUTPATIENT
Start: 2021-03-29

## 2021-06-22 DIAGNOSIS — R19.5 POSITIVE FIT (FECAL IMMUNOCHEMICAL TEST): Primary | ICD-10-CM

## 2021-06-24 DIAGNOSIS — I10 ESSENTIAL HYPERTENSION: ICD-10-CM

## 2021-06-24 RX ORDER — LOSARTAN POTASSIUM 50 MG/1
TABLET ORAL
Qty: 30 TABLET | Refills: 3 | Status: SHIPPED | OUTPATIENT
Start: 2021-06-24 | End: 2021-09-22

## 2021-06-30 ENCOUNTER — OFFICE VISIT (OUTPATIENT)
Dept: FAMILY MEDICINE CLINIC | Age: 66
End: 2021-06-30
Payer: MEDICARE

## 2021-06-30 VITALS
TEMPERATURE: 98.5 F | HEIGHT: 59 IN | DIASTOLIC BLOOD PRESSURE: 81 MMHG | OXYGEN SATURATION: 98 % | SYSTOLIC BLOOD PRESSURE: 130 MMHG | BODY MASS INDEX: 35.4 KG/M2 | RESPIRATION RATE: 16 BRPM | HEART RATE: 76 BPM | WEIGHT: 175.6 LBS

## 2021-06-30 DIAGNOSIS — R19.5 POSITIVE FIT (FECAL IMMUNOCHEMICAL TEST): ICD-10-CM

## 2021-06-30 DIAGNOSIS — R73.01 IMPAIRED FASTING GLUCOSE: ICD-10-CM

## 2021-06-30 DIAGNOSIS — Z23 ENCOUNTER FOR IMMUNIZATION: ICD-10-CM

## 2021-06-30 DIAGNOSIS — E05.90 SUBCLINICAL HYPERTHYROIDISM: ICD-10-CM

## 2021-06-30 DIAGNOSIS — E78.5 HYPERLIPIDEMIA, UNSPECIFIED HYPERLIPIDEMIA TYPE: ICD-10-CM

## 2021-06-30 DIAGNOSIS — I10 ESSENTIAL HYPERTENSION: Primary | ICD-10-CM

## 2021-06-30 LAB — HBA1C MFR BLD HPLC: 5.7 %

## 2021-06-30 PROCEDURE — 3017F COLORECTAL CA SCREEN DOC REV: CPT | Performed by: INTERNAL MEDICINE

## 2021-06-30 PROCEDURE — G8432 DEP SCR NOT DOC, RNG: HCPCS | Performed by: INTERNAL MEDICINE

## 2021-06-30 PROCEDURE — G8536 NO DOC ELDER MAL SCRN: HCPCS | Performed by: INTERNAL MEDICINE

## 2021-06-30 PROCEDURE — 1090F PRES/ABSN URINE INCON ASSESS: CPT | Performed by: INTERNAL MEDICINE

## 2021-06-30 PROCEDURE — G8754 DIAS BP LESS 90: HCPCS | Performed by: INTERNAL MEDICINE

## 2021-06-30 PROCEDURE — G8752 SYS BP LESS 140: HCPCS | Performed by: INTERNAL MEDICINE

## 2021-06-30 PROCEDURE — G8427 DOCREV CUR MEDS BY ELIG CLIN: HCPCS | Performed by: INTERNAL MEDICINE

## 2021-06-30 PROCEDURE — 83036 HEMOGLOBIN GLYCOSYLATED A1C: CPT | Performed by: INTERNAL MEDICINE

## 2021-06-30 PROCEDURE — 1101F PT FALLS ASSESS-DOCD LE1/YR: CPT | Performed by: INTERNAL MEDICINE

## 2021-06-30 PROCEDURE — G9899 SCRN MAM PERF RSLTS DOC: HCPCS | Performed by: INTERNAL MEDICINE

## 2021-06-30 PROCEDURE — 99214 OFFICE O/P EST MOD 30 MIN: CPT | Performed by: INTERNAL MEDICINE

## 2021-06-30 PROCEDURE — G8417 CALC BMI ABV UP PARAM F/U: HCPCS | Performed by: INTERNAL MEDICINE

## 2021-06-30 NOTE — PATIENT INSTRUCTIONS
DASH Diet: Care Instructions  Your Care Instructions     The DASH diet is an eating plan that can help lower your blood pressure. DASH stands for Dietary Approaches to Stop Hypertension. Hypertension is high blood pressure. The DASH diet focuses on eating foods that are high in calcium, potassium, and magnesium. These nutrients can lower blood pressure. The foods that are highest in these nutrients are fruits, vegetables, low-fat dairy products, nuts, seeds, and legumes. But taking calcium, potassium, and magnesium supplements instead of eating foods that are high in those nutrients does not have the same effect. The DASH diet also includes whole grains, fish, and poultry. The DASH diet is one of several lifestyle changes your doctor may recommend to lower your high blood pressure. Your doctor may also want you to decrease the amount of sodium in your diet. Lowering sodium while following the DASH diet can lower blood pressure even further than just the DASH diet alone. Follow-up care is a key part of your treatment and safety. Be sure to make and go to all appointments, and call your doctor if you are having problems. It's also a good idea to know your test results and keep a list of the medicines you take. How can you care for yourself at home? Following the DASH diet  · Eat 4 to 5 servings of fruit each day. A serving is 1 medium-sized piece of fruit, ½ cup chopped or canned fruit, 1/4 cup dried fruit, or 4 ounces (½ cup) of fruit juice. Choose fruit more often than fruit juice. · Eat 4 to 5 servings of vegetables each day. A serving is 1 cup of lettuce or raw leafy vegetables, ½ cup of chopped or cooked vegetables, or 4 ounces (½ cup) of vegetable juice. Choose vegetables more often than vegetable juice. · Get 2 to 3 servings of low-fat and fat-free dairy each day. A serving is 8 ounces of milk, 1 cup of yogurt, or 1 ½ ounces of cheese. · Eat 6 to 8 servings of grains each day.  A serving is 1 slice of bread, 1 ounce of dry cereal, or ½ cup of cooked rice, pasta, or cooked cereal. Try to choose whole-grain products as much as possible. · Limit lean meat, poultry, and fish to 2 servings each day. A serving is 3 ounces, about the size of a deck of cards. · Eat 4 to 5 servings of nuts, seeds, and legumes (cooked dried beans, lentils, and split peas) each week. A serving is 1/3 cup of nuts, 2 tablespoons of seeds, or ½ cup of cooked beans or peas. · Limit fats and oils to 2 to 3 servings each day. A serving is 1 teaspoon of vegetable oil or 2 tablespoons of salad dressing. · Limit sweets and added sugars to 5 servings or less a week. A serving is 1 tablespoon jelly or jam, ½ cup sorbet, or 1 cup of lemonade. · Eat less than 2,300 milligrams (mg) of sodium a day. If you limit your sodium to 1,500 mg a day, you can lower your blood pressure even more. · Be aware that all of these are the suggested number of servings for people who eat 1,800 to 2,000 calories a day. Your recommended number of servings may be different if you need more or fewer calories. Tips for success  · Start small. Do not try to make dramatic changes to your diet all at once. You might feel that you are missing out on your favorite foods and then be more likely to not follow the plan. Make small changes, and stick with them. Once those changes become habit, add a few more changes. · Try some of the following:  ? Make it a goal to eat a fruit or vegetable at every meal and at snacks. This will make it easy to get the recommended amount of fruits and vegetables each day. ? Try yogurt topped with fruit and nuts for a snack or healthy dessert. ? Add lettuce, tomato, cucumber, and onion to sandwiches. ? Combine a ready-made pizza crust with low-fat mozzarella cheese and lots of vegetable toppings. Try using tomatoes, squash, spinach, broccoli, carrots, cauliflower, and onions. ?  Have a variety of cut-up vegetables with a low-fat dip as an appetizer instead of chips and dip. ? Sprinkle sunflower seeds or chopped almonds over salads. Or try adding chopped walnuts or almonds to cooked vegetables. ? Try some vegetarian meals using beans and peas. Add garbanzo or kidney beans to salads. Make burritos and tacos with mashed june beans or black beans. Where can you learn more? Go to http://www.peñaloza.com/  Enter H967 in the search box to learn more about \"DASH Diet: Care Instructions. \"  Current as of: August 31, 2020               Content Version: 12.8  © 1283-9443 [address] Care instructions adapted under license by [employer] (which disclaims liability or warranty for this information). If you have questions about a medical condition or this instruction, always ask your healthcare professional. [address] any warranty or liability for your use of this information.

## 2021-06-30 NOTE — PROGRESS NOTES
1. Have you been to the ER, urgent care clinic since your last visit? Hospitalized since your last visit? No    2. Have you seen or consulted any other health care providers outside of the [address] [place] [address] since your last visit? Include any pap smears or colon screening. No    Patient needs referral to Gastrology, previous referral denied due to insurance coverage     Patient states Losartan causes itching and low bp readings, does not take daily.          Health Maintenance Due   Topic Date Due    Shingrix Vaccine Age 49> (1 of 2) Never done    Pneumococcal 65+ years (2 of 2 - PPSV23) 01/15/2021

## 2021-06-30 NOTE — PROGRESS NOTES
Assessment/ Plan:   Diagnoses and all orders for this visit:    1. Essential hypertension-controlled, continue to monitor BPs at home    2. Hyperlipidemia, unspecified hyperlipidemia type-check lipids on addition of Lipitor  -     METABOLIC PANEL, COMPREHENSIVE; Future  -     LIPID PANEL; Future    3. Subclinical hyperthyroidism-if TSH is still low, will refer to Endo  -     TSH 3RD GENERATION; Future  -     T3, FREE; Future  -     T4, FREE; Future    4. Impaired fasting glucose-continue to watch carbs in diet and exercise regularly  -     AMB POC HEMOGLOBIN A1C    5. Positive FIT (fecal immunochemical test)-refer to GI for colonoscopy  -     CBC WITH AUTOMATED DIFF; Future    Pneumovax next visit-left without getting this    Follow-up and Dispositions    · Return in about 3 months (around 9/30/2021) for follow up.    Routing History            Chief Complaint   Patient presents with    Follow Up Chronic Condition     Gastrology referral       Pt is a 76y.o. year old female who presents for follow up of her chronic medical problems    Health Maintenance Due   Topic Date Due    Shingrix Vaccine Age 49> (1 of 2)-advised to get this from her pharmacy Never done    Pneumococcal 65+ years (2 of 2 - PPSV23)-Pneumovax 21 today 01/15/2021     Takes Losartan only when BP goes above 130 bec it makes her itch    BP Readings from Last 3 Encounters:   06/30/21 130/81   03/11/21 (!) 150/81   01/29/20 (!) 141/91       Wt Readings from Last 3 Encounters:   06/30/21 175 lb 9.6 oz (79.7 kg)   03/11/21 173 lb 12.8 oz (78.8 kg)   01/29/20 171 lb (77.6 kg)   has cut bread and rice and not losing weight    Lab Results   Component Value Date/Time    TSH 0.410 (L) 03/11/2021 11:22 AM   no family hx of thyroid disease    Lab Results   Component Value Date/Time    Cholesterol, total 211 (H) 03/11/2021 11:22 AM    HDL Cholesterol 61 03/11/2021 11:22 AM    LDL, calculated 138 (H) 03/11/2021 11:22 AM    LDL, calculated 156 (H) 01/30/2020 08:47 AM    VLDL, calculated 12 03/11/2021 11:22 AM    VLDL, calculated 13 01/30/2020 08:47 AM    Triglyceride 65 03/11/2021 11:22 AM    CHOL/HDL Ratio 2.8 05/29/2019 03:29 AM   Has been on Lipitor since  Fasting today       Last Point of Care HGB A1C  Hemoglobin A1c (POC)   Date Value Ref Range Status   06/30/2021 5.7 % Final      Positive FIT test-needs to see GI bec the other referral did not take her insurance  Hemorrhoid was bleeding then she says  ROS:    Pt denies: Wt loss, Fever/Chills, HA, Visual changes, Fatigue, Chest pain, SOB, CPOELAND, Abd pain, N/V/D/C, Blood in stool or urine, Edema. Pertinent positive as above in HPI. All others were negative    Patient Active Problem List   Diagnosis Code    Bursitis of shoulder, left M75.52    Subcutaneous mass-lt/ neck R22.9    Keratinous cyst-left neck L72.0    Primary osteoarthritis of left hip M16.12    Family history of CVA Z82.3    Hyperlipidemia E78.5    Severe obesity ([address] E66.01    Essential hypertension I10    Age-related osteoporosis without current pathological fracture M81.0    Impaired fasting glucose R73.01       Past Medical History:   Diagnosis Date    Arthritis     Diverticulitis        Current Outpatient Medications   Medication Sig Dispense Refill    losartan (COZAAR) 50 mg tablet TAKE 1 TABLET BY MOUTH DAILY 30 Tablet 3    atorvastatin (LIPITOR) 10 mg tablet Take 1 Tab by mouth daily. 90 Tab 1    aspirin 81 mg chewable tablet Take 1 Tab by mouth daily. 30 Tab 0    Calcium-Cholecalciferol, D3, (CALCIUM 600 WITH VITAMIN D3) 600 mg(1,500mg) -400 unit chew Take 1 Tab by mouth daily.  cyanocobalamin, vitamin B-12, 500 mcg TbDi 1 Tab by SubLINGual route daily.  fish oil-dha-epa 1,200-144-216 mg cap Take 2 Caps by mouth two (2) times a day.  co-enzyme Q-10 (CO Q-10) 100 mg capsule Take 100 mg by mouth daily.          Social History     Tobacco Use   Smoking Status Never Smoker   Smokeless Tobacco Never Used       Allergies Allergen Reactions    Latex Rash    Naprosyn [Naproxen] Itching    Norvasc [Amlodipine] Rash    Strawberry Itching and Swelling     Per pt report       Patient Labs were reviewed: yes    Patient Past Records were reviewed: yes      Objective:     Vitals:    06/30/21 0913 06/30/21 0938   BP: (!) 146/84 130/81   Pulse: 75 76   Resp: 16    Temp: 98.5 °F (36.9 °C)    TempSrc: Temporal    SpO2: 98%    Weight: 175 lb 9.6 oz (79.7 kg)    Height: 4' 11\" (1.499 m)      Body mass index is 35.47 kg/m². Exam:   Appearance: alert, well appearing,  oriented to person, place, and time, acyanotic, in no respiratory distress and well hydrated. HEENT:  NC/AT, pink conj, anicteric sclerae  Neck:  No cervical lymphadenopathy, no JVD, no thyromegaly, no carotid bruit  Heart:  RRR without M/R/G  Lungs:  CTAB, no rhonchi, rales, or wheezes with good air exchange   Abdomen:  Non-tender, pos bowel sounds, no hepatosplenomegaly  Ext:  No C/C/E    Skin: no rash  Neuro: no lateralizing signs, CNs II-XII intact            I have discussed the diagnosis with the patient and the intended plan as seen in the above orders. The patient has received an After-Visit Summary and questions were answered concerning future plans. Medication Side Effects and Warnings were discussed with patient: yes    Patient verbalized understanding of above instructions.     Kimberly Amezcua MD  Internal Medicine  [facility]

## 2021-07-05 LAB
ALBUMIN SERPL-MCNC: 4.2 G/DL (ref 3.8–4.8)
ALBUMIN/GLOB SERPL: 1.5 {RATIO} (ref 1.2–2.2)
ALP SERPL-CCNC: 84 IU/L (ref 48–121)
ALT SERPL-CCNC: 15 IU/L (ref 0–32)
AST SERPL-CCNC: 11 IU/L (ref 0–40)
BASOPHILS # BLD AUTO: 0 X10E3/UL (ref 0–0.2)
BASOPHILS NFR BLD AUTO: 1 %
BILIRUB SERPL-MCNC: 0.3 MG/DL (ref 0–1.2)
BUN SERPL-MCNC: 14 MG/DL (ref 8–27)
BUN/CREAT SERPL: 15 (ref 12–28)
CALCIUM SERPL-MCNC: 9.3 MG/DL (ref 8.7–10.3)
CHLORIDE SERPL-SCNC: 102 MMOL/L (ref 96–106)
CHOLEST SERPL-MCNC: 172 MG/DL (ref 100–199)
CO2 SERPL-SCNC: 26 MMOL/L (ref 20–29)
CREAT SERPL-MCNC: 0.94 MG/DL (ref 0.57–1)
EOSINOPHIL # BLD AUTO: 0.1 X10E3/UL (ref 0–0.4)
EOSINOPHIL NFR BLD AUTO: 3 %
ERYTHROCYTE [DISTWIDTH] IN BLOOD BY AUTOMATED COUNT: 15.3 % (ref 11.7–15.4)
GLOBULIN SER CALC-MCNC: 2.8 G/DL (ref 1.5–4.5)
GLUCOSE SERPL-MCNC: 91 MG/DL (ref 65–99)
HCT VFR BLD AUTO: 42.6 % (ref 34–46.6)
HDLC SERPL-MCNC: 75 MG/DL
HGB BLD-MCNC: 13.6 G/DL (ref 11.1–15.9)
IMM GRANULOCYTES # BLD AUTO: 0 X10E3/UL (ref 0–0.1)
IMM GRANULOCYTES NFR BLD AUTO: 0 %
IMP & REVIEW OF LAB RESULTS: NORMAL
LDLC SERPL CALC-MCNC: 88 MG/DL (ref 0–99)
LYMPHOCYTES # BLD AUTO: 1.7 X10E3/UL (ref 0.7–3.1)
LYMPHOCYTES NFR BLD AUTO: 41 %
MCH RBC QN AUTO: 25.7 PG (ref 26.6–33)
MCHC RBC AUTO-ENTMCNC: 31.9 G/DL (ref 31.5–35.7)
MCV RBC AUTO: 81 FL (ref 79–97)
MONOCYTES # BLD AUTO: 0.3 X10E3/UL (ref 0.1–0.9)
MONOCYTES NFR BLD AUTO: 7 %
NEUTROPHILS # BLD AUTO: 2 X10E3/UL (ref 1.4–7)
NEUTROPHILS NFR BLD AUTO: 48 %
PLATELET # BLD AUTO: 190 X10E3/UL (ref 150–450)
POTASSIUM SERPL-SCNC: 4.5 MMOL/L (ref 3.5–5.2)
PROT SERPL-MCNC: 7 G/DL (ref 6–8.5)
RBC # BLD AUTO: 5.29 X10E6/UL (ref 3.77–5.28)
SODIUM SERPL-SCNC: 139 MMOL/L (ref 134–144)
T3FREE SERPL-MCNC: 2.6 PG/ML (ref 2–4.4)
T4 FREE SERPL-MCNC: 1.03 NG/DL (ref 0.82–1.77)
TRIGL SERPL-MCNC: 44 MG/DL (ref 0–149)
TSH SERPL DL<=0.005 MIU/L-ACNC: 0.65 UIU/ML (ref 0.45–4.5)
VLDLC SERPL CALC-MCNC: 9 MG/DL (ref 5–40)
WBC # BLD AUTO: 4.2 X10E3/UL (ref 3.4–10.8)

## 2021-10-06 ENCOUNTER — OFFICE VISIT (OUTPATIENT)
Dept: FAMILY MEDICINE CLINIC | Age: 66
End: 2021-10-06
Payer: MEDICARE

## 2021-10-06 ENCOUNTER — HOSPITAL ENCOUNTER (OUTPATIENT)
Dept: LAB | Age: 66
Discharge: HOME | End: 2021-10-06
Payer: MEDICARE

## 2021-10-06 VITALS
HEIGHT: 59 IN | DIASTOLIC BLOOD PRESSURE: 62 MMHG | WEIGHT: 173.4 LBS | RESPIRATION RATE: 16 BRPM | OXYGEN SATURATION: 98 % | HEART RATE: 68 BPM | TEMPERATURE: 98.3 F | BODY MASS INDEX: 34.96 KG/M2 | SYSTOLIC BLOOD PRESSURE: 114 MMHG

## 2021-10-06 DIAGNOSIS — I10 ESSENTIAL HYPERTENSION: ICD-10-CM

## 2021-10-06 DIAGNOSIS — Z23 ENCOUNTER FOR IMMUNIZATION: ICD-10-CM

## 2021-10-06 DIAGNOSIS — R73.01 IMPAIRED FASTING GLUCOSE: ICD-10-CM

## 2021-10-06 DIAGNOSIS — E05.90 SUBCLINICAL HYPERTHYROIDISM: ICD-10-CM

## 2021-10-06 DIAGNOSIS — R51.9 NONINTRACTABLE EPISODIC HEADACHE, UNSPECIFIED HEADACHE TYPE: ICD-10-CM

## 2021-10-06 DIAGNOSIS — R19.5 POSITIVE FIT (FECAL IMMUNOCHEMICAL TEST): ICD-10-CM

## 2021-10-06 DIAGNOSIS — E55.9 VITAMIN D DEFICIENCY: ICD-10-CM

## 2021-10-06 DIAGNOSIS — I10 ESSENTIAL HYPERTENSION: Primary | ICD-10-CM

## 2021-10-06 DIAGNOSIS — M81.0 AGE-RELATED OSTEOPOROSIS WITHOUT CURRENT PATHOLOGICAL FRACTURE: ICD-10-CM

## 2021-10-06 DIAGNOSIS — E66.01 SEVERE OBESITY (HCC): ICD-10-CM

## 2021-10-06 DIAGNOSIS — E78.5 HYPERLIPIDEMIA, UNSPECIFIED HYPERLIPIDEMIA TYPE: ICD-10-CM

## 2021-10-06 LAB
ALBUMIN SERPL-MCNC: 3.6 G/DL (ref 3.4–5)
ALBUMIN/GLOB SERPL: 0.9 {RATIO} (ref 0.8–1.7)
ALP SERPL-CCNC: 73 U/L (ref 45–117)
ALT SERPL-CCNC: 25 U/L (ref 13–56)
ANION GAP SERPL CALC-SCNC: 7 MMOL/L (ref 3–18)
AST SERPL-CCNC: 10 U/L (ref 10–38)
BILIRUB SERPL-MCNC: 0.4 MG/DL (ref 0.2–1)
BUN SERPL-MCNC: 13 MG/DL (ref 7–18)
BUN/CREAT SERPL: 14 (ref 12–20)
CALCIUM SERPL-MCNC: 8.9 MG/DL (ref 8.5–10.1)
CHLORIDE SERPL-SCNC: 105 MMOL/L (ref 100–111)
CHOLEST SERPL-MCNC: 168 MG/DL
CO2 SERPL-SCNC: 29 MMOL/L (ref 21–32)
CREAT SERPL-MCNC: 0.93 MG/DL (ref 0.6–1.3)
EST. AVERAGE GLUCOSE BLD GHB EST-MCNC: 120 MG/DL
GLOBULIN SER CALC-MCNC: 3.8 G/DL (ref 2–4)
GLUCOSE SERPL-MCNC: 85 MG/DL (ref 74–99)
HBA1C MFR BLD: 5.8 % (ref 4.2–5.6)
HDLC SERPL-MCNC: 71 MG/DL (ref 40–60)
HDLC SERPL: 2.4 {RATIO} (ref 0–5)
LDLC SERPL CALC-MCNC: 84.4 MG/DL (ref 0–100)
LIPID PROFILE,FLP: ABNORMAL
POTASSIUM SERPL-SCNC: 4.5 MMOL/L (ref 3.5–5.5)
PROT SERPL-MCNC: 7.4 G/DL (ref 6.4–8.2)
SODIUM SERPL-SCNC: 141 MMOL/L (ref 136–145)
TRIGL SERPL-MCNC: 63 MG/DL (ref ?–150)
VLDLC SERPL CALC-MCNC: 12.6 MG/DL

## 2021-10-06 PROCEDURE — 90694 VACC AIIV4 NO PRSRV 0.5ML IM: CPT | Performed by: INTERNAL MEDICINE

## 2021-10-06 PROCEDURE — G8510 SCR DEP NEG, NO PLAN REQD: HCPCS | Performed by: INTERNAL MEDICINE

## 2021-10-06 PROCEDURE — 3017F COLORECTAL CA SCREEN DOC REV: CPT | Performed by: INTERNAL MEDICINE

## 2021-10-06 PROCEDURE — 83036 HEMOGLOBIN GLYCOSYLATED A1C: CPT

## 2021-10-06 PROCEDURE — G8752 SYS BP LESS 140: HCPCS | Performed by: INTERNAL MEDICINE

## 2021-10-06 PROCEDURE — G8427 DOCREV CUR MEDS BY ELIG CLIN: HCPCS | Performed by: INTERNAL MEDICINE

## 2021-10-06 PROCEDURE — G8754 DIAS BP LESS 90: HCPCS | Performed by: INTERNAL MEDICINE

## 2021-10-06 PROCEDURE — G8536 NO DOC ELDER MAL SCRN: HCPCS | Performed by: INTERNAL MEDICINE

## 2021-10-06 PROCEDURE — 1090F PRES/ABSN URINE INCON ASSESS: CPT | Performed by: INTERNAL MEDICINE

## 2021-10-06 PROCEDURE — 1101F PT FALLS ASSESS-DOCD LE1/YR: CPT | Performed by: INTERNAL MEDICINE

## 2021-10-06 PROCEDURE — G8417 CALC BMI ABV UP PARAM F/U: HCPCS | Performed by: INTERNAL MEDICINE

## 2021-10-06 PROCEDURE — 80053 COMPREHEN METABOLIC PANEL: CPT

## 2021-10-06 PROCEDURE — 99214 OFFICE O/P EST MOD 30 MIN: CPT | Performed by: INTERNAL MEDICINE

## 2021-10-06 PROCEDURE — 80061 LIPID PANEL: CPT

## 2021-10-06 PROCEDURE — G0008 ADMIN INFLUENZA VIRUS VAC: HCPCS | Performed by: INTERNAL MEDICINE

## 2021-10-06 PROCEDURE — G9899 SCRN MAM PERF RSLTS DOC: HCPCS | Performed by: INTERNAL MEDICINE

## 2021-10-06 NOTE — PROGRESS NOTES
Patient seen for routine follow up on blood pressure. Patient c/o itching since taking bp and cholesterol medication. Referral request to Neurology for headaches. .1. Have you been to the ER, urgent care clinic since your last visit? Hospitalized since your last visit? No    2. Have you seen or consulted any other health care providers outside of the [address] [place] [address] since your last visit? Include any pap smears or colon screening. No       Health Maintenance Due   Topic Date Due    Shingrix Vaccine Age 49> (1 of 2) Never done    Pneumococcal 65+ years (2 of 2 - PPSV23) 01/15/2021     Patient was given VIS for review,consent was obtained and per orders of Dr. Rena Yu, injection of Fluad given by [facility]. Patient observed. No signs nor symptoms of any adverse reactions. Patient tolerated injection well.

## 2021-10-06 NOTE — PROGRESS NOTES
Assessment/ Plan:   Diagnoses and all orders for this visit:    1. Essential hypertension-BP controlled with prn Losartan-patient feels that this is giving her a neck rash so she checks BP daily to see if she needs to take it; also had previous intolerances to BP meds  Echo c/o Cardio normal  -     METABOLIC PANEL, COMPREHENSIVE; Future    2. Hyperlipidemia, unspecified hyperlipidemia type-goal LDL of less than 100 on Lipitor  -     LIPID PANEL; Future    3. Impaired fasting glucose-continue with weight loss efforts  -     HEMOGLOBIN A1C WITH EAG; Future    4. Subclinical hyperthyroidism-repeat TSH normal; patient also appears more hypothyroid    5. Age-related osteoporosis without current pathological fracture-declined meds but currently taking natural supplements    6. Vitamin D deficiency-on OTC /vit D    7. Nonintractable episodic headache, unspecified headache type-nonspecific \"head feels heavy\" complaint but requested to see Neuro; ?related to untreated JOSTIN bec she cannot tolerate the mask  -     REFERRAL TO NEUROLOGY    8. Positive FIT (fecal immunochemical test)-had colonoscopy -will obtain results    9. Severe obesity (HCC)-discussed limiting álvaro to 1626-0917/day and exercising at least 150 min a week    10. Encounter for immunization  -     FLU (FLUAD QUAD INFLUENZA VACCINE,QUAD,ADJUVANTED)  -     ADMIN INFLUENZA VIRUS VAC        Follow-up and Dispositions    · Return in about 3 months (around 1/6/2022) for follow up.    Routing History                         Chief Complaint   Patient presents with    Follow Up Chronic Condition     blood pressure     Rash      reaction to blood pressure medication       Pt is a 76y.o. year old female who presents for follow up of her chronic medical problems    Health Maintenance Due   Topic Date Due    Shingrix Vaccine Age 49> (1 of 2) Never done    Pneumococcal 65+ years (2 of 2 - PPSV23) 01/15/2021    Flu Vaccine (1)-ok to take it today 09/01/2021       BP Readings from Last 3 Encounters:   10/06/21 114/62   06/30/21 130/81   03/11/21 (!) 150/81   on prn Losartan when BP goes over 140/90 bec of itching when she takes it  Watching her diet carefully  BP up when she wakes up; but goes to the bathroom a lot when she takes Losartan at night    Wt Readings from Last 3 Encounters:   10/06/21 173 lb 6.4 oz (78.7 kg)   06/30/21 175 lb 9.6 oz (79.7 kg)   03/11/21 173 lb 12.8 oz (78.8 kg)     Lab Results   Component Value Date/Time    TSH 0.646 06/30/2021 12:00 AM   from 0.410      Lab Results   Component Value Date/Time    Cholesterol, total 172 06/30/2021 12:00 AM    HDL Cholesterol 75 06/30/2021 12:00 AM    LDL, calculated 88 06/30/2021 12:00 AM    LDL, calculated 156 (H) 01/30/2020 08:47 AM    VLDL, calculated 9 06/30/2021 12:00 AM    VLDL, calculated 13 01/30/2020 08:47 AM    Triglyceride 44 06/30/2021 12:00 AM    CHOL/HDL Ratio 2.8 05/29/2019 03:29 AM   On Lipitor-compliant    Lab Results   Component Value Date/Time    Hemoglobin A1c 6.0 (H) 03/11/2021 11:22 AM    Hemoglobin A1c (POC) 5.7 06/30/2021 09:34 AM   Denies polyuria, polydipsia and polyphagia       Positive FIT test-saw GI? Yes, had colonoscopy Aug 30    Itching in the neck and groin when she takes Losartan or Lipitor    Brother age 68 passed away from kidney disease-was on dialysis    Wants to see Neuro-headaches/\"head feels heavy and something is wrong in there\"; forgetful at times so she wants it checked    Has poor sleep-dx with sleep apnea but cannot sleep with the CPAP  Has taken too much Melatonin  ROS:    Pt denies: Wt loss, Fever/Chills, HA, Visual changes, Fatigue, Chest pain, SOB, COPELAND, Abd pain, N/V/D/C, Blood in stool or urine, Edema. Pertinent positive as above in HPI.  All others were negative    Patient Active Problem List   Diagnosis Code    Bursitis of shoulder, left M75.52    Subcutaneous mass-lt/ neck R22.9    Keratinous cyst-left neck L72.0    Primary osteoarthritis of left hip M16.12    Family history of CVA Z82.3    Hyperlipidemia E78.5    Severe obesity ([address] E66.01    Essential hypertension I10    Age-related osteoporosis without current pathological fracture M81.0    Impaired fasting glucose R73.01       Past Medical History:   Diagnosis Date    Arthritis     Diverticulitis        Current Outpatient Medications   Medication Sig Dispense Refill    OTHER Take 2 Tablets by mouth daily. Strontium Boost      atorvastatin (LIPITOR) 10 mg tablet TAKE 1 TABLET BY MOUTH DAILY 90 Tablet 1    losartan (COZAAR) 50 mg tablet TAKE 1 TABLET BY MOUTH DAILY 90 Tablet 1    aspirin 81 mg chewable tablet Take 1 Tab by mouth daily. 30 Tab 0    Calcium-Cholecalciferol, D3, (CALCIUM 600 WITH VITAMIN D3) 600 mg(1,500mg) -400 unit chew Take 1 Tab by mouth daily.  cyanocobalamin, vitamin B-12, 500 mcg TbDi 1 Tab by SubLINGual route daily.  fish oil-dha-epa 1,200-144-216 mg cap Take 2 Caps by mouth two (2) times a day.  co-enzyme Q-10 (CO Q-10) 100 mg capsule Take 100 mg by mouth daily. (Patient not taking: Reported on 10/6/2021)         Social History     Tobacco Use   Smoking Status Never Smoker   Smokeless Tobacco Never Used       Allergies   Allergen Reactions    Latex Rash    Naprosyn [Naproxen] Itching    Norvasc [Amlodipine] Rash    Strawberry Itching and Swelling     Per pt report       Patient Labs were reviewed: yes    Patient Past Records were reviewed: yes      Objective:     Vitals:    10/06/21 0849   BP: 114/62   Pulse: 68   Resp: 16   Temp: 98.3 °F (36.8 °C)   TempSrc: Temporal   SpO2: 98%   Weight: 173 lb 6.4 oz (78.7 kg)   Height: 4' 11\" (1.499 m)     Body mass index is 35.02 kg/m². Exam:   Appearance: alert, well appearing,  oriented to person, place, and time, acyanotic, in no respiratory distress and well hydrated.   HEENT:  NC/AT, pink conj, anicteric sclerae  Neck:  No cervical lymphadenopathy, no JVD, no thyromegaly, no carotid bruit  Heart:  RRR without M/R/G  Lungs:  CTAB, no rhonchi, rales, or wheezes with good air exchange   Abdomen:  Non-tender, pos bowel sounds, no hepatosplenomegaly  Ext:  No C/C/E    Skin: no rash  Neuro: no lateralizing signs, CNs II-XII intact            I have discussed the diagnosis with the patient and the intended plan as seen in the above orders. The patient has received an After-Visit Summary and questions were answered concerning future plans. Medication Side Effects and Warnings were discussed with patient: yes    Patient verbalized understanding of above instructions.     Live Cuello MD  Internal Medicine  [facility]

## 2021-10-06 NOTE — PATIENT INSTRUCTIONS
DASH Diet: Care Instructions  Your Care Instructions     The DASH diet is an eating plan that can help lower your blood pressure. DASH stands for Dietary Approaches to Stop Hypertension. Hypertension is high blood pressure. The DASH diet focuses on eating foods that are high in calcium, potassium, and magnesium. These nutrients can lower blood pressure. The foods that are highest in these nutrients are fruits, vegetables, low-fat dairy products, nuts, seeds, and legumes. But taking calcium, potassium, and magnesium supplements instead of eating foods that are high in those nutrients does not have the same effect. The DASH diet also includes whole grains, fish, and poultry. The DASH diet is one of several lifestyle changes your doctor may recommend to lower your high blood pressure. Your doctor may also want you to decrease the amount of sodium in your diet. Lowering sodium while following the DASH diet can lower blood pressure even further than just the DASH diet alone. Follow-up care is a key part of your treatment and safety. Be sure to make and go to all appointments, and call your doctor if you are having problems. It's also a good idea to know your test results and keep a list of the medicines you take. How can you care for yourself at home? Following the DASH diet  · Eat 4 to 5 servings of fruit each day. A serving is 1 medium-sized piece of fruit, ½ cup chopped or canned fruit, 1/4 cup dried fruit, or 4 ounces (½ cup) of fruit juice. Choose fruit more often than fruit juice. · Eat 4 to 5 servings of vegetables each day. A serving is 1 cup of lettuce or raw leafy vegetables, ½ cup of chopped or cooked vegetables, or 4 ounces (½ cup) of vegetable juice. Choose vegetables more often than vegetable juice. · Get 2 to 3 servings of low-fat and fat-free dairy each day. A serving is 8 ounces of milk, 1 cup of yogurt, or 1 ½ ounces of cheese. · Eat 6 to 8 servings of grains each day.  A serving is 1 slice of bread, 1 ounce of dry cereal, or ½ cup of cooked rice, pasta, or cooked cereal. Try to choose whole-grain products as much as possible. · Limit lean meat, poultry, and fish to 2 servings each day. A serving is 3 ounces, about the size of a deck of cards. · Eat 4 to 5 servings of nuts, seeds, and legumes (cooked dried beans, lentils, and split peas) each week. A serving is 1/3 cup of nuts, 2 tablespoons of seeds, or ½ cup of cooked beans or peas. · Limit fats and oils to 2 to 3 servings each day. A serving is 1 teaspoon of vegetable oil or 2 tablespoons of salad dressing. · Limit sweets and added sugars to 5 servings or less a week. A serving is 1 tablespoon jelly or jam, ½ cup sorbet, or 1 cup of lemonade. · Eat less than 2,300 milligrams (mg) of sodium a day. If you limit your sodium to 1,500 mg a day, you can lower your blood pressure even more. · Be aware that all of these are the suggested number of servings for people who eat 1,800 to 2,000 calories a day. Your recommended number of servings may be different if you need more or fewer calories. Tips for success  · Start small. Do not try to make dramatic changes to your diet all at once. You might feel that you are missing out on your favorite foods and then be more likely to not follow the plan. Make small changes, and stick with them. Once those changes become habit, add a few more changes. · Try some of the following:  ? Make it a goal to eat a fruit or vegetable at every meal and at snacks. This will make it easy to get the recommended amount of fruits and vegetables each day. ? Try yogurt topped with fruit and nuts for a snack or healthy dessert. ? Add lettuce, tomato, cucumber, and onion to sandwiches. ? Combine a ready-made pizza crust with low-fat mozzarella cheese and lots of vegetable toppings. Try using tomatoes, squash, spinach, broccoli, carrots, cauliflower, and onions. ?  Have a variety of cut-up vegetables with a low-fat dip as an appetizer instead of chips and dip. ? Sprinkle sunflower seeds or chopped almonds over salads. Or try adding chopped walnuts or almonds to cooked vegetables. ? Try some vegetarian meals using beans and peas. Add garbanzo or kidney beans to salads. Make burritos and tacos with mashed june beans or black beans. Where can you learn more? Go to http://www.peñaloza.com/  Enter H967 in the search box to learn more about \"DASH Diet: Care Instructions. \"  Current as of: April 29, 2021               Content Version: 13.0  © 2794-6996 [employer]. Care instructions adapted under license by [employer] (which disclaims liability or warranty for this information). If you have questions about a medical condition or this instruction, always ask your healthcare professional. [address] any warranty or liability for your use of this information.

## 2021-11-03 DIAGNOSIS — G47.00 INSOMNIA, UNSPECIFIED TYPE: Primary | ICD-10-CM

## 2021-11-03 DIAGNOSIS — G43.009 MIGRAINE WITHOUT AURA AND WITHOUT STATUS MIGRAINOSUS, NOT INTRACTABLE: ICD-10-CM

## 2022-01-10 ENCOUNTER — VIRTUAL VISIT (OUTPATIENT)
Dept: FAMILY MEDICINE CLINIC | Age: 67
End: 2022-01-10
Payer: MEDICARE

## 2022-01-10 DIAGNOSIS — R73.01 IMPAIRED FASTING GLUCOSE: ICD-10-CM

## 2022-01-10 DIAGNOSIS — E78.5 HYPERLIPIDEMIA, UNSPECIFIED HYPERLIPIDEMIA TYPE: ICD-10-CM

## 2022-01-10 DIAGNOSIS — E05.90 SUBCLINICAL HYPERTHYROIDISM: ICD-10-CM

## 2022-01-10 DIAGNOSIS — M81.0 AGE-RELATED OSTEOPOROSIS WITHOUT CURRENT PATHOLOGICAL FRACTURE: ICD-10-CM

## 2022-01-10 DIAGNOSIS — E66.01 SEVERE OBESITY (HCC): ICD-10-CM

## 2022-01-10 DIAGNOSIS — I10 ESSENTIAL HYPERTENSION: Primary | ICD-10-CM

## 2022-01-10 PROCEDURE — G9899 SCRN MAM PERF RSLTS DOC: HCPCS | Performed by: INTERNAL MEDICINE

## 2022-01-10 PROCEDURE — G8427 DOCREV CUR MEDS BY ELIG CLIN: HCPCS | Performed by: INTERNAL MEDICINE

## 2022-01-10 PROCEDURE — G8756 NO BP MEASURE DOC: HCPCS | Performed by: INTERNAL MEDICINE

## 2022-01-10 PROCEDURE — 99214 OFFICE O/P EST MOD 30 MIN: CPT | Performed by: INTERNAL MEDICINE

## 2022-01-10 PROCEDURE — 1090F PRES/ABSN URINE INCON ASSESS: CPT | Performed by: INTERNAL MEDICINE

## 2022-01-10 PROCEDURE — G8510 SCR DEP NEG, NO PLAN REQD: HCPCS | Performed by: INTERNAL MEDICINE

## 2022-01-10 PROCEDURE — 1101F PT FALLS ASSESS-DOCD LE1/YR: CPT | Performed by: INTERNAL MEDICINE

## 2022-01-10 PROCEDURE — 3017F COLORECTAL CA SCREEN DOC REV: CPT | Performed by: INTERNAL MEDICINE

## 2022-01-10 NOTE — PROGRESS NOTES
Iman Curry is a 78 y.o. female who was seen by synchronous (real-time) audio-video technology on 1/10/2022 for Follow-up and Hypertension        Assessment & Plan:   Diagnoses and all orders for this visit:    1. Essential hypertension-continue with home monitoring and only taking prn Losartan    2. Hyperlipidemia, unspecified hyperlipidemia type-goal LDL of less than 100 on Lipitor    3. Impaired fasting glucose-check A1c next visit; continue with efforts at weight loss    4. Age-related osteoporosis without current pathological fracture-advised to continue with daily Ca+D, walk for exercise; she continues to decline to take meds like Fosamax or Prolia for fear of side effects  -     DEXA BONE DENSITY STUDY AXIAL; Future    5. Subclinical hyperthyroidism-check TFTs next visit    6.  Severe obesity (HCC)-discussed limiting álvaro to 8261-6398/day and exercising at least 150 min a week; commended about recent weight loss        Follow-up and Dispositions    · Return in about 3 months (around 4/10/2022) for follow up.       70          2  Subjective:      Health Maintenance Due   Topic Date Due    Shingrix Vaccine Age 49> (1 of 2) Never done    Pneumococcal 65+ years (2 of 2 - PPSV23) 01/15/2021   got her booster on Friday      BP Readings from Last 3 Encounters:   10/06/21 114/62   06/30/21 130/81   03/11/21 (!) 150/81     Home BPs-good  meds reviewed-taking Losartan sometimes only; not when SBP is less than 120    Lab Results   Component Value Date/Time    Cholesterol, total 168 10/06/2021 09:50 AM    HDL Cholesterol 71 (H) 10/06/2021 09:50 AM    LDL, calculated 84.4 10/06/2021 09:50 AM    VLDL, calculated 12.6 10/06/2021 09:50 AM    Triglyceride 63 10/06/2021 09:50 AM    CHOL/HDL Ratio 2.4 10/06/2021 09:50 AM   on Lipitor since-compliant    appt for sleep study Jan 27  Wakes up from sleep all time/not sleeping well; snores loud per daughter      Lab Results   Component Value Date/Time    Hemoglobin A1c 5.8 (H) 10/06/2021 09:50 AM    Hemoglobin A1c (POC) 5.7 2021 09:34 AM   pos fam hx of DM    Wt Readings from Last 3 Encounters:   10/06/21 173 lb 6.4 oz (78.7 kg)   21 175 lb 9.6 oz (79.7 kg)   21 173 lb 12.8 oz (78.8 kg)   166.5 lbs today-has been watching her diet-avoiding white rice and bread    Needs Dexa-follow up osteoporosis from 2021-[facility]  Only wants to take natural med    Lab Results   Component Value Date/Time    TSH 0.646 2021 12:00 AM       Worried about her kidneys-brother  of kidney failure    Prior to Admission medications    Medication Sig Start Date End Date Taking? Authorizing Provider   OTHER Take 2 Tablets by mouth daily. Strontium Boost   Yes Provider, Historical   atorvastatin (LIPITOR) 10 mg tablet TAKE 1 TABLET BY MOUTH DAILY 21  Yes Madhuri Delgado MD   losartan (COZAAR) 50 mg tablet TAKE 1 TABLET BY MOUTH DAILY 21  Yes Nery Yap MD   aspirin 81 mg chewable tablet Take 1 Tab by mouth daily. 19  Yes Smiley Mayer MD   Calcium-Cholecalciferol, D3, (CALCIUM 600 WITH VITAMIN D3) 600 mg(1,500mg) -400 unit chew Take 1 Tab by mouth daily. Yes Provider, Historical   cyanocobalamin, vitamin B-12, 500 mcg TbDi 1 Tab by SubLINGual route daily. Yes Provider, Historical   fish oil-dha-epa 1,200-144-216 mg cap Take 2 Caps by mouth two (2) times a day. Yes Provider, Historical   co-enzyme Q-10 (CO Q-10) 100 mg capsule Take 100 mg by mouth daily.    Yes Provider, Historical     Patient Active Problem List    Diagnosis Date Noted    Subclinical hyperthyroidism 01/10/2022    Impaired fasting glucose 2020    Essential hypertension 2020    Age-related osteoporosis without current pathological fracture 2020    Severe obesity ([address] 10/30/2018    Hyperlipidemia 2018    Family history of CVA 2016    Primary osteoarthritis of left hip 2016    Keratinous cyst-left neck 2015    Subcutaneous mass-lt/ neck 09/17/2015    Bursitis of shoulder, left 05/15/2014       ROS  Pt denies: Wt loss, Fever/Chills, HA, Visual changes, Fatigue, Chest pain, SOB, COPELAND, Abd pain, N/V/D/C, Blood in stool or urine, Edema. Pertinent positive as above in HPI. All others were negative  Objective:     Patient-Reported Vitals 1/10/2022   Patient-Reported Weight 166   Patient-Reported Pulse 65   Patient-Reported Systolic  969   Patient-Reported Diastolic 84      General: alert, cooperative, no distress   Mental  status: normal mood, behavior, speech, dress, motor activity, and thought processes, able to follow commands   HENT: NCAT   Neck: no visualized mass   Resp: no respiratory distress   Neuro: no gross deficits   Skin: no discoloration or lesions of concern on visible areas   Psychiatric: normal affect, consistent with stated mood, no evidence of hallucinations     Additional exam findings: none      We discussed the expected course, resolution and complications of the diagnosis(es) in detail. Medication risks, benefits, costs, interactions, and alternatives were discussed as indicated. I advised her to contact the office if her condition worsens, changes or fails to improve as anticipated. She expressed understanding with the diagnosis(es) and plan. Isaías Valvedre, was evaluated through a synchronous (real-time) audio-video encounter. The patient (or guardian if applicable) is aware that this is a billable service. Verbal consent to proceed has been obtained within the past 12 months. The visit was conducted pursuant to the emergency declaration under the [facility] [address] and the [employer] General Act. Patient identification was verified, and a caregiver was present when appropriate. The patient was located in a state where the provider was credentialed to provide care.     Betsy Maxwell MD

## 2022-01-10 NOTE — PROGRESS NOTES
Yvette Abreu presents today for   Chief Complaint   Patient presents with    Follow-up    Hypertension       Virtual/telephone visit    Depression Screening:  3 most recent PHQ Screens 1/10/2022   Little interest or pleasure in doing things Not at all   Feeling down, depressed, irritable, or hopeless Not at all   Total Score PHQ 2 0       Learning Assessment:  Learning Assessment 11/4/2015   PRIMARY LEARNER Patient   PRIMARY LANGUAGE ENGLISH   LEARNER PREFERENCE PRIMARY LISTENING   ANSWERED BY self   RELATIONSHIP SELF       Fall Risk  Fall Risk Assessment, last 12 mths 3/11/2021   Able to walk? Yes   Fall in past 12 months? 0   Number of falls in past 12 months -   Fall with injury? -       ADL  No flowsheet data found. Health Maintenance reviewed and discussed and ordered per Provider. Health Maintenance Due   Topic Date Due    Shingrix Vaccine Age 49> (1 of 2) Never done    Pneumococcal 65+ years (2 of 2 - PPSV23) 01/15/2021   . Coordination of Care:  1. Have you been to the ER, urgent care clinic since your last visit? Hospitalized since your last visit? no    2. Have you seen or consulted any other health care providers outside of the [address] [place] [address] since your last visit? Include any pap smears or colon screening. Yes Sleep Study Consultation December 2021.

## 2022-01-10 NOTE — PATIENT INSTRUCTIONS
Deciding About Bisphosphonate Medicine for Osteoporosis  How can you decide about taking bisphosphonate medicine for osteoporosis? This information is right for you if you are a woman who has been through menopause. If that does not describe you, or if you're not sure, talk with your doctor about this decision. What is osteoporosis? Osteoporosis is a disease that affects your bones. It means you have bones that are thin and brittle and have lots of holes inside them like a sponge. This makes them easy to break. It can lead to broken bones (fractures), especially in the hip, spine, and wrist. These fractures may make it hard for you to live on your own. Bisphosphonates are the most common medicines used to prevent bone loss. They may be taken as a pill or an injection into a vein. Bisphosphonates slow the way bone dissolves and is absorbed by your body. They can increase bone thickness and strength. What are key points about this decision? · If you are at a higher risk of having a fracture, taking bisphosphonates is more likely to help you prevent a fracture. If your risk of a fracture is lower, it's less likely that these medicines will help you. · Bisphosphonates can cause problems with the jaw or thigh bone. But most women do not have these side effects. · Whether you take medicine or not, healthy habits can help protect your bones. Get enough calcium and vitamin D. Get regular weight-bearing exercise. Cut back on alcohol. And if you smoke, quit. Who is helped the most by bisphosphonates? For women who have been through menopause:  · If you have osteoporosis (your T-score is -2.5 or less) or you have had a fracture, taking bisphosphonates lowers your risk of having a fracture. · If you haven't had a fracture and you have low bone density (your T-score is between -1.0 and -2.5, sometimes called osteopenia), taking bisphosphonates might lower your risk of having a fracture.  This evidence is not as strong. What are the side effects of bisphosphonates? These medicines can have side effects, such as heartburn and belly pain. Certain bone problems have also been reported in women taking bisphosphonates. Out of 1,000 people, about 1 person has a bone side effect during a year of taking bisphosphonates. That means 999 out of 1,000 people do not have a bone side effect. These bone side effects include problems with the jaw bone (called osteonecrosis). They also might include a certain kind of fracture of the thigh bone (called an atypical fracture), but more research is needed to find out if taking bisphosphonates is a cause of these fractures. Your decision  Thinking about the facts and your feelings can help you make a decision that is right for you. Be sure you understand the benefits and risks of your options, and think about what else you need to do before you make the decision. Where can you learn more? Go to http://www.gray.com/  Enter [id number] in the search box to learn more about \"Deciding About Bisphosphonate Medicine for Osteoporosis. \"  Current as of: December 7, 2020               Content Version: 13.0  © 2006-2021 Healthwise[employer]. Care instructions adapted under license by [employer] (which disclaims liability or warranty for this information). If you have questions about a medical condition or this instruction, always ask your healthcare professional. [place] [address] any warranty or liability for your use of this information.

## 2022-03-18 PROBLEM — E78.5 HYPERLIPIDEMIA: Status: ACTIVE | Noted: 2018-05-28

## 2022-03-18 PROBLEM — E05.90 SUBCLINICAL HYPERTHYROIDISM: Status: ACTIVE | Noted: 2022-01-10

## 2022-03-19 PROBLEM — E66.01 SEVERE OBESITY (HCC): Status: ACTIVE | Noted: 2018-10-30

## 2022-03-19 PROBLEM — R73.01 IMPAIRED FASTING GLUCOSE: Status: ACTIVE | Noted: 2020-11-09

## 2022-03-19 PROBLEM — M81.0 AGE-RELATED OSTEOPOROSIS WITHOUT CURRENT PATHOLOGICAL FRACTURE: Status: ACTIVE | Noted: 2020-08-18

## 2022-03-19 PROBLEM — I10 ESSENTIAL HYPERTENSION: Status: ACTIVE | Noted: 2020-08-18

## 2022-04-28 ENCOUNTER — OFFICE VISIT (OUTPATIENT)
Dept: FAMILY MEDICINE CLINIC | Age: 67
End: 2022-04-28

## 2022-04-28 VITALS
HEIGHT: 59 IN | SYSTOLIC BLOOD PRESSURE: 163 MMHG | BODY MASS INDEX: 34.84 KG/M2 | HEART RATE: 75 BPM | WEIGHT: 172.8 LBS | DIASTOLIC BLOOD PRESSURE: 75 MMHG

## 2022-04-28 DIAGNOSIS — G25.81 RLS (RESTLESS LEGS SYNDROME): ICD-10-CM

## 2022-04-28 DIAGNOSIS — Z00.00 MEDICARE ANNUAL WELLNESS VISIT, SUBSEQUENT: Primary | ICD-10-CM

## 2022-04-28 DIAGNOSIS — G47.33 OSA ON CPAP: ICD-10-CM

## 2022-04-28 DIAGNOSIS — E55.9 VITAMIN D DEFICIENCY: ICD-10-CM

## 2022-04-28 DIAGNOSIS — I10 ESSENTIAL HYPERTENSION: ICD-10-CM

## 2022-04-28 DIAGNOSIS — E05.90 SUBCLINICAL HYPERTHYROIDISM: ICD-10-CM

## 2022-04-28 DIAGNOSIS — M25.562 ACUTE PAIN OF LEFT KNEE: ICD-10-CM

## 2022-04-28 DIAGNOSIS — E78.5 HYPERLIPIDEMIA, UNSPECIFIED HYPERLIPIDEMIA TYPE: ICD-10-CM

## 2022-04-28 DIAGNOSIS — Z99.89 OSA ON CPAP: ICD-10-CM

## 2022-04-28 DIAGNOSIS — R73.01 IMPAIRED FASTING GLUCOSE: ICD-10-CM

## 2022-04-28 PROCEDURE — G0439 PPPS, SUBSEQ VISIT: HCPCS | Performed by: INTERNAL MEDICINE

## 2022-04-28 PROCEDURE — 1090F PRES/ABSN URINE INCON ASSESS: CPT | Performed by: INTERNAL MEDICINE

## 2022-04-28 PROCEDURE — 99214 OFFICE O/P EST MOD 30 MIN: CPT | Performed by: INTERNAL MEDICINE

## 2022-04-28 PROCEDURE — 3017F COLORECTAL CA SCREEN DOC REV: CPT | Performed by: INTERNAL MEDICINE

## 2022-04-28 PROCEDURE — G8754 DIAS BP LESS 90: HCPCS | Performed by: INTERNAL MEDICINE

## 2022-04-28 PROCEDURE — G8753 SYS BP > OR = 140: HCPCS | Performed by: INTERNAL MEDICINE

## 2022-04-28 PROCEDURE — 1101F PT FALLS ASSESS-DOCD LE1/YR: CPT | Performed by: INTERNAL MEDICINE

## 2022-04-28 PROCEDURE — G9899 SCRN MAM PERF RSLTS DOC: HCPCS | Performed by: INTERNAL MEDICINE

## 2022-04-28 PROCEDURE — G8417 CALC BMI ABV UP PARAM F/U: HCPCS | Performed by: INTERNAL MEDICINE

## 2022-04-28 PROCEDURE — G8427 DOCREV CUR MEDS BY ELIG CLIN: HCPCS | Performed by: INTERNAL MEDICINE

## 2022-04-28 PROCEDURE — G8510 SCR DEP NEG, NO PLAN REQD: HCPCS | Performed by: INTERNAL MEDICINE

## 2022-04-28 PROCEDURE — G8536 NO DOC ELDER MAL SCRN: HCPCS | Performed by: INTERNAL MEDICINE

## 2022-04-28 NOTE — PROGRESS NOTES
Patient seen for annual medicare wellness visit c/o left knee pain x 2wks after exercising on bicycle, has not taken any pain meds. Reports not taking Losartan daily, only when systolic >751, bp readings at home <120    Health Maintenance Due   Topic Date Due    Shingrix Vaccine Age 49> (1 of 2) Never done    Pneumococcal 65+ years (2 - PPSV23 or PCV20) 01/15/2021    Medicare Yearly Exam  03/12/2022     1. \"Have you been to the ER, urgent care clinic since your last visit? Hospitalized since your last visit? \" No    2. \"Have you seen or consulted any other health care providers outside of the [address] [place] [address] since your last visit? \" Yes Sleep Study     3. For patients aged 39-70: Has the patient had a colonoscopy / FIT/ Cologuard? Yes - no Care Gap present      If the patient is female:    4. For patients aged 41-77: Has the patient had a mammogram within the past 2 years? Yes - no Care Gap present      5. For patients aged 21-65: Has the patient had a pap smear?  NA - based on age or sex

## 2022-04-28 NOTE — PATIENT INSTRUCTIONS

## 2022-04-28 NOTE — PROGRESS NOTES
Assessment/ Plan:   Diagnoses and all orders for this visit:    1. Medicare annual wellness visit, subsequent-see note below    2. Acute pain of left knee-will sent to Ortho or PT depending on Xray results  -     XR KNEE LT MIN 4 V; Future  -     URIC ACID; Future    3. Essential hypertension-advised to take Losartan daily  -     CBC WITH AUTOMATED DIFF; Future  -     METABOLIC PANEL, COMPREHENSIVE; Future    4. Impaired fasting glucose-continue to watch diet  -     AMB POC HEMOGLOBIN A1C  -     HEMOGLOBIN A1C WITH EAG; Future    5. Hyperlipidemia, unspecified hyperlipidemia type-low cholesterol diet advised  and will determine ASCVD risk with next labs to see if she will benefit from taking a statin  -     LIPID PANEL; Future    6. Vitamin D deficiency-on OTC Vit D   -     VITAMIN D, 25 HYDROXY; Future    7. Subclinical hyperthyroidism-will continue to monitor  -     TSH 3RD GENERATION; Future  -     T3, FREE; Future  -     T4, FREE; Future    8. JOSTIN on CPAP -new dx since last visit    9. RLS (restless legs syndrome)-doing better on the iron pills started by Sleep doc            Follow-up and Dispositions    · Return in about 3 months (around 7/28/2022) for follow up.                      Chief Complaint   Patient presents with    Annual Wellness Visit    Knee Pain     left       Pt is a 78y.o. year old female who presents for follow up of her chronic medical problems    Health Maintenance Due   Topic Date Due    Shingrix Vaccine Age 49> (1 of 2) Never done    Pneumococcal 65+ years (2 - PPSV23 or PCV20) 01/15/2021      BP Readings from Last 3 Encounters:   04/28/22 (!) 163/75   10/06/21 114/62   06/30/21 130/81   takes her Losartan prn  123/76 at home this morning      Wt Readings from Last 3 Encounters:   04/28/22 172 lb 12.8 oz (78.4 kg)   10/06/21 173 lb 6.4 oz (78.7 kg)   06/30/21 175 lb 9.6 oz (79.7 kg)       Here for left knee pain on the medial area since last week after using the exercise bike  Has not yet taken any OTC med  Cannot put weight on it and cannot bed it  No previous knee issues  No fam hx of gout    Saw sleep doc and has JOSTIN, now using CPAP-too tight around the head and giving her a headche, waiting for the new set up  On iron for restless legs    Lab Results   Component Value Date/Time    Hemoglobin A1c 6.3 (H) 04/28/2022 12:52 PM    Hemoglobin A1c (POC) 5.7 06/30/2021 09:34 AM       ROS:    Pt denies: Wt loss, Fever/Chills, HA, Visual changes, Fatigue, Chest pain, SOB, COPELAND, Abd pain, N/V/D/C, Blood in stool or urine, Edema. Pertinent positive as above in HPI. All others were negative    Patient Active Problem List   Diagnosis Code    Bursitis of shoulder, left M75.52    Subcutaneous mass-lt/ neck R22.9    Keratinous cyst-left neck L72.0    Primary osteoarthritis of left hip M16.12    Family history of CVA Z82.3    Hyperlipidemia E78.5    Severe obesity ([address] E66.01    Essential hypertension I10    Age-related osteoporosis without current pathological fracture M81.0    Impaired fasting glucose R73.01    Subclinical hyperthyroidism E05.90    JOSTIN on CPAP G47.33, Z99.89    RLS (restless legs syndrome) G25.81       Past Medical History:   Diagnosis Date    Arthritis     Diverticulitis        Current Outpatient Medications   Medication Sig Dispense Refill    OTHER Take 2 Tablets by mouth daily. Strontium Boost      atorvastatin (LIPITOR) 10 mg tablet TAKE 1 TABLET BY MOUTH DAILY 90 Tablet 1    losartan (COZAAR) 50 mg tablet TAKE 1 TABLET BY MOUTH DAILY (Patient taking differently: Takes as needed) 90 Tablet 1    aspirin 81 mg chewable tablet Take 1 Tab by mouth daily. 30 Tab 0    Calcium-Cholecalciferol, D3, (CALCIUM 600 WITH VITAMIN D3) 600 mg(1,500mg) -400 unit chew Take 1 Tab by mouth daily.  cyanocobalamin, vitamin B-12, 500 mcg TbDi 1 Tab by SubLINGual route daily.  fish oil-dha-epa 1,200-144-216 mg cap Take 2 Caps by mouth two (2) times a day.       co-enzyme Q-10 (CO Q-10) 100 mg capsule Take 100 mg by mouth daily. Social History     Tobacco Use   Smoking Status Never Smoker   Smokeless Tobacco Never Used       Allergies   Allergen Reactions    Latex Rash    Naprosyn [Naproxen] Itching    Norvasc [Amlodipine] Rash    Strawberry Itching and Swelling     Per pt report       Patient Labs were reviewed: yes    Patient Past Records were reviewed: yes      Objective:     Vitals:    04/28/22 1130 04/28/22 1219   BP:  (!) 163/75   Pulse:  75   Weight: 172 lb 12.8 oz (78.4 kg)    Height: 4' 11\" (1.499 m)      Body mass index is 34.9 kg/m². Exam:   Appearance: alert, well appearing,  oriented to person, place, and time, acyanotic, in no respiratory distress and well hydrated. HEENT:  NC/AT, pink conj, anicteric sclerae  Neck:  No cervical lymphadenopathy, no JVD, no thyromegaly, no carotid bruit  Heart:  RRR without M/R/G  Lungs:  CTAB, no rhonchi, rales, or wheezes with good air exchange   Abdomen:  Non-tender, pos bowel sounds, no hepatosplenomegaly  Ext:  No C/C/E    Skin: no rash  Neuro: no lateralizing signs, CNs II-XII intact            I have discussed the diagnosis with the patient and the intended plan as seen in the above orders. The patient has received an After-Visit Summary and questions were answered concerning future plans. Medication Side Effects and Warnings were discussed with patient: yes    Patient verbalized understanding of above instructions. Yang Mustafa MD  Internal Medicine  [facility]    This is the Subsequent Medicare Annual Wellness Exam, performed 12 months or more after the Initial AWV or the last Subsequent AWV    I have reviewed the patient's medical history in detail and updated the computerized patient record.        Assessment/Plan   Education and counseling provided:  Are appropriate based on today's review and evaluation  End-of-Life planning (with patient's consent)-primary decision maker verified  Pneumococcal Vaccine-done  Influenza Vaccine-done  Screening Mammography-3/2022, normal  Colorectal cancer screening tests-up to date  Cardiovascular screening blood test-lipids up to date  Bone mass measurement (DEXA)-3/2022, improved/osteopenia  Screening for glaucoma-eye exam is current  Diabetes screening test-FBS up to date    1. Medicare annual wellness visit, subsequent-Refer to above for plan and to patient instructions for recommendations on      RTC yearly for wellness visit    Depression Risk Factor Screening     3 most recent PHQ Screens 4/28/2022   Little interest or pleasure in doing things Not at all   Feeling down, depressed, irritable, or hopeless Not at all   Total Score PHQ 2 0       Alcohol & Drug Abuse Risk Screen    Do you average more than 1 drink per night or more than 7 drinks a week:  No    On any one occasion in the past three months have you have had more than 3 drinks containing alcohol:  No          Functional Ability and Level of Safety    Hearing: Hearing is good. Activities of Daily Living: The home contains: no safety equipment. Patient does total self care      Ambulation: with mild difficulty     Fall Risk:  Fall Risk Assessment, last 12 mths 4/28/2022   Able to walk? Yes   Fall in past 12 months? 0   Do you feel unsteady? 0   Are you worried about falling 0   Number of falls in past 12 months -   Fall with injury?  -      Abuse Screen:  Patient is not abused       Cognitive Screening    Has your family/caregiver stated any concerns about your memory: no     Cognitive Screening: Normal - Clock Drawing Test    Health Maintenance Due     Health Maintenance Due   Topic Date Due    Shingrix Vaccine Age 50> (1 of 2) Never done    Pneumococcal 65+ years (2 - PPSV23 or PCV20) 01/15/2021       Patient Care Team   Patient Care Team:  Irina Harley MD as PCP - General (Internal Medicine)  Irina Harley MD as PCP - [facility] Empaneled Provider  Natali Morgan MD (General Surgery)    History     Patient Active Problem List   Diagnosis Code    Bursitis of shoulder, left M75.52    Subcutaneous mass-lt/ neck R22.9    Keratinous cyst-left neck L72.0    Primary osteoarthritis of left hip M16.12    Family history of CVA Z82.3    Hyperlipidemia E78.5    Severe obesity [address] [address] E66.01    Essential hypertension I10    Age-related osteoporosis without current pathological fracture M81.0    Impaired fasting glucose R73.01    Subclinical hyperthyroidism E05.90    JOSTIN on CPAP G47.33, Z99.89    RLS (restless legs syndrome) G25.81     Past Medical History:   Diagnosis Date    Arthritis     Diverticulitis       History reviewed. No pertinent surgical history. Current Outpatient Medications   Medication Sig Dispense Refill    OTHER Take 2 Tablets by mouth daily. Strontium Boost      atorvastatin (LIPITOR) 10 mg tablet TAKE 1 TABLET BY MOUTH DAILY 90 Tablet 1    losartan (COZAAR) 50 mg tablet TAKE 1 TABLET BY MOUTH DAILY (Patient taking differently: Takes as needed) 90 Tablet 1    aspirin 81 mg chewable tablet Take 1 Tab by mouth daily. 30 Tab 0    Calcium-Cholecalciferol, D3, (CALCIUM 600 WITH VITAMIN D3) 600 mg(1,500mg) -400 unit chew Take 1 Tab by mouth daily.  cyanocobalamin, vitamin B-12, 500 mcg TbDi 1 Tab by SubLINGual route daily.  fish oil-dha-epa 1,200-144-216 mg cap Take 2 Caps by mouth two (2) times a day.  co-enzyme Q-10 (CO Q-10) 100 mg capsule Take 100 mg by mouth daily.        Allergies   Allergen Reactions    Latex Rash    Naprosyn [Naproxen] Itching    Norvasc [Amlodipine] Rash    Strawberry Itching and Swelling     Per pt report       Family History   Problem Relation Age of Onset    Hypertension Mother     Stroke Mother     Breast Cancer Other 35     Social History     Tobacco Use    Smoking status: Never Smoker    Smokeless tobacco: Never Used   Substance Use Topics    Alcohol use: No     Alcohol/week: 0.0 standard drinks Angela Kwong MD

## 2022-04-29 LAB
25(OH)D3+25(OH)D2 SERPL-MCNC: 60.3 NG/ML (ref 30–100)
ALBUMIN SERPL-MCNC: 4.3 G/DL (ref 3.8–4.8)
ALBUMIN/GLOB SERPL: 1.3 {RATIO} (ref 1.2–2.2)
ALP SERPL-CCNC: 90 IU/L (ref 44–121)
ALT SERPL-CCNC: 16 IU/L (ref 0–32)
AST SERPL-CCNC: 16 IU/L (ref 0–40)
BASOPHILS # BLD AUTO: 0 X10E3/UL (ref 0–0.2)
BASOPHILS NFR BLD AUTO: 1 %
BILIRUB SERPL-MCNC: 0.4 MG/DL (ref 0–1.2)
BUN SERPL-MCNC: 12 MG/DL (ref 8–27)
BUN/CREAT SERPL: 15 (ref 12–28)
CALCIUM SERPL-MCNC: 9.2 MG/DL (ref 8.7–10.3)
CHLORIDE SERPL-SCNC: 100 MMOL/L (ref 96–106)
CHOLEST SERPL-MCNC: 225 MG/DL (ref 100–199)
CO2 SERPL-SCNC: 25 MMOL/L (ref 20–29)
CREAT SERPL-MCNC: 0.8 MG/DL (ref 0.57–1)
EGFR: 81 ML/MIN/1.73
EOSINOPHIL # BLD AUTO: 0.1 X10E3/UL (ref 0–0.4)
EOSINOPHIL NFR BLD AUTO: 2 %
ERYTHROCYTE [DISTWIDTH] IN BLOOD BY AUTOMATED COUNT: 14.8 % (ref 11.7–15.4)
EST. AVERAGE GLUCOSE BLD GHB EST-MCNC: 134 MG/DL
GLOBULIN SER CALC-MCNC: 3.2 G/DL (ref 1.5–4.5)
GLUCOSE SERPL-MCNC: 86 MG/DL (ref 65–99)
HBA1C MFR BLD: 6.3 % (ref 4.8–5.6)
HCT VFR BLD AUTO: 43.3 % (ref 34–46.6)
HDLC SERPL-MCNC: 73 MG/DL
HGB BLD-MCNC: 14.1 G/DL (ref 11.1–15.9)
IMM GRANULOCYTES # BLD AUTO: 0 X10E3/UL (ref 0–0.1)
IMM GRANULOCYTES NFR BLD AUTO: 0 %
IMP & REVIEW OF LAB RESULTS: NORMAL
LDLC SERPL CALC-MCNC: 141 MG/DL (ref 0–99)
LYMPHOCYTES # BLD AUTO: 1.8 X10E3/UL (ref 0.7–3.1)
LYMPHOCYTES NFR BLD AUTO: 44 %
MCH RBC QN AUTO: 25.7 PG (ref 26.6–33)
MCHC RBC AUTO-ENTMCNC: 32.6 G/DL (ref 31.5–35.7)
MCV RBC AUTO: 79 FL (ref 79–97)
MONOCYTES # BLD AUTO: 0.3 X10E3/UL (ref 0.1–0.9)
MONOCYTES NFR BLD AUTO: 8 %
NEUTROPHILS # BLD AUTO: 1.9 X10E3/UL (ref 1.4–7)
NEUTROPHILS NFR BLD AUTO: 45 %
PLATELET # BLD AUTO: 183 X10E3/UL (ref 150–450)
POTASSIUM SERPL-SCNC: 4 MMOL/L (ref 3.5–5.2)
PROT SERPL-MCNC: 7.5 G/DL (ref 6–8.5)
RBC # BLD AUTO: 5.49 X10E6/UL (ref 3.77–5.28)
SODIUM SERPL-SCNC: 142 MMOL/L (ref 134–144)
T3FREE SERPL-MCNC: 2.8 PG/ML (ref 2–4.4)
T4 FREE SERPL-MCNC: 1.18 NG/DL (ref 0.82–1.77)
TRIGL SERPL-MCNC: 65 MG/DL (ref 0–149)
TSH SERPL DL<=0.005 MIU/L-ACNC: 0.74 UIU/ML (ref 0.45–4.5)
URATE SERPL-MCNC: 4.9 MG/DL (ref 3–7.2)
VLDLC SERPL CALC-MCNC: 11 MG/DL (ref 5–40)
WBC # BLD AUTO: 4.2 X10E3/UL (ref 3.4–10.8)

## 2022-05-03 DIAGNOSIS — M17.12 PRIMARY OSTEOARTHRITIS OF LEFT KNEE: ICD-10-CM

## 2022-05-03 DIAGNOSIS — M25.562 ACUTE PAIN OF LEFT KNEE: Primary | ICD-10-CM

## 2022-05-04 DIAGNOSIS — M17.12 PRIMARY OSTEOARTHRITIS OF LEFT KNEE: ICD-10-CM

## 2022-05-04 DIAGNOSIS — M25.562 ACUTE PAIN OF LEFT KNEE: Primary | ICD-10-CM

## 2022-05-04 RX ORDER — CELECOXIB 100 MG/1
100 CAPSULE ORAL
Qty: 60 CAPSULE | Refills: 0 | Status: SHIPPED | OUTPATIENT
Start: 2022-05-04 | End: 2022-10-17 | Stop reason: ALTCHOICE

## 2022-05-06 ENCOUNTER — HOSPITAL ENCOUNTER (OUTPATIENT)
Dept: PHYSICAL THERAPY | Age: 67
Discharge: HOME | End: 2022-05-06
Payer: MEDICARE

## 2022-05-06 PROCEDURE — 97162 PT EVAL MOD COMPLEX 30 MIN: CPT

## 2022-05-06 NOTE — PROGRESS NOTES
[address] [facility] AT [address], [address] [place] [address] Road  Phone: [phone]   [phone]  PLAN OF CARE / [address] [place] [address] PHYSICAL THERAPY SERVICES  Patient Name: Zelda Sellers : 1955   Medical   Diagnosis: Pain in left knee [M25.562] Treatment Diagnosis: Pain in left knee [M25.562]   Onset Date: 3 weeks prior to IE     Referral Source: Funmilayo Fischer MD Start of Care [facility]): 2022   Prior Hospitalization: See medical history Provider #: [id number]   Prior Level of Function: Lives in 2 story home with bed/bath downstairs; gardens   Comorbidities: Hx of osteoporosis, HTN, latex allergy, BMI > 30   Medications: Verified on Patient Summary List   The Plan of Care and following information is based on the information from the initial evaluation.   ===========================================================================================  Assessment / key information: Pt is a 79 y/o F who presents to PT w/ c/o L knee pain, that has been present for about 3 weeks after riding exercise bike for 15 minutes. Reports pain began later that evening. Reports exercising on bike daily prior to this, and notes nothing different during this particular session. Pt notes pain ranges 7 to 9/10, made worse with movement, walking, rising to stand, stairs; better with wearing knee brace and OTC topical gel. Describes pain as achey and sharp, located medial knee. Pt c/o instability and fear of buckling. Testing/imaging has included x-ray which showed minimal OA changes. Rx Celebrex, just filled last night. Prior treatment has included nothing to date. Red flags negative. FOTO 21/100, indicating 79% functional impairment. Clinical Exam Findings:  POSTURE/OBSERVATION: presents with L knee brace with B lateral uprights and patella cut out.  No significant effusion noted to   FUNCTIONAL ASSESSMENT: gait antalgic, dec stance time L, flexed L knee and dec knee flexion on swing; sit<>stand requires use of B UE, inc time to perform; educated in use of [facility] R with pt demonstrating improvement in gait pattern. stairs NR to ascend/descend (cued to ascend w/ R, descend with L); squat to near parallel with B UE support, symmetrical WB; SL HR R unable/L 1 partial range; bridge to 50% ROM. ROM:  AROM (degrees) RIGHT LEFT   Knee A/PROM 8-0-120 8p!-0-84p!/flexion PROM 100p! Ankle DF 9 5   Gastroc flexibility 0 0     STRENGTH:  MMT RIGHT LEFT   Hip flex 3+/5 3+/5   Hip abd 2+/5 2+/5 p! Hip ext 3-/5 3-/5   Quad set normal Fair p! Knee flex 4-/5 3-/5 p! Knee ext 4/5 3/5   Ankle DF 4+/5 4/5   Ankle PF 2/5 2+/5     PALPATION: sign TTP along L MCL, popliteus and distal medial HS. Patella mobility WNL all planes. SPECIAL TEST: (+) valgus stress test at 30 deg of flex for mild laxity and audible \"clunk\". Unable to assess Brando, pivot shift tests 2' pt guarding. (-) lachman's. Pt presents w/ sx suggesting possible L MCL sprain and/or underlying mensical damage. Pt could benefit from PT to address impairments and functional limitations in order to improve pt's ability to complete ADLs.  ===========================================================================================  Eval Complexity: History MEDIUM  Complexity : 1-2 comorbidities / personal factors will impact the outcome/ POC ;  Examination  MEDIUM Complexity : 3 Standardized tests and measures addressing body structure, function, activity limitation and / or participation in recreation ; Presentation MEDIUM Complexity : Evolving with changing characteristics ;   Decision Making HIGH Complexity : FOTO score of 1- 25 ; Overall Complexity MEDIUM  Problem List: pain affecting function, decrease ROM, decrease strength, impaired gait/ balance, decrease ADL/ functional abilitiies, decrease activity tolerance, decrease flexibility/ joint mobility and decrease transfer abilities   Treatment Plan may include any combination of the following: Therapeutic exercise, Therapeutic activities, Neuromuscular re-education, Physical agent/modality, Gait/balance training, Manual therapy, Patient education, Self Care training, Functional mobility training, Home safety training and Stair training  Patient / Family readiness to learn indicated by: asking questions, trying to perform skills and interest  Persons(s) to be included in education: patient (P)  Barriers to Learning/Limitations: None  Measures taken, if barriers to learning:    Patient Goal (s): \"alleviate pain and become more mobile\"   Patient self reported health status: excellent  Rehabilitation Potential: good   Short Term Goals: To be accomplished in  2  weeks:  1. Establish HEP to supplement PT tx and maximize outcomes  2. Pt will be I and compliant with HEP for self management of sx.  Long Term Goals: To be accomplished in  4  weeks:  1. Improve L knee flexion AROM to at least 120 deg to improve gait mechanics and stair clearance  2. Improve L knee ext MMT to at least 3+/5 to improve knee stability while walking  3. Pt will report at least 65% reduction in pain level to improve activity tolerance  4. Improve FOTO score to >/= 56/100 to indicate improved function    Frequency / Duration:   Patient to be seen  2  times per week for 4  weeks: All LTG as above will be assessed and updated every 10 visits or 30 days and progressed as needed    Patient / Caregiver education and instruction: activity modification, brace/ splint application and other use of CP, SPC  Therapist Signature: Chas Fuller PT Date: 7/0/0942   Certification Period: 5/6/2022 - 8/3/2022 Time: 9:16 AM   ===========================================================================================  I certify that the above Physical Therapy Services are being furnished while the patient is under my care.   I agree with the treatment plan and certify that this therapy is necessary. Physician Signature:        Date:       Time:                         Lillian Mahan MD  Please sign and return to [facility] UNIT or you may fax the signed copy to [phone]. Thank you.

## 2022-05-06 NOTE — PROGRESS NOTES
PHYSICAL THERAPY - DAILY TREATMENT NOTE    Patient Name: Monika Marquez        Date: 2022  : 1955   yes Patient  Verified  Visit #:   1   of   8  Insurance: Payor: Tiffanie Alcazar / Plan: [address] [place] [address] HMO / Product Type: Managed Care Medicare /      In time: 11:00 Out time: 11:44   Total Treatment Time: 44     Medicare/BCBS Time Tracking (below)   Total Timed Codes (min):  5 (NC) 1:1 Treatment Time:  44     TREATMENT AREA =  Pain in left knee [M25.562]    SUBJECTIVE  Pain Level (on 0 to 10 scale):  10  / 10   Medication Changes/New allergies or changes in medical history, any new surgeries or procedures?    no  If yes, update Summary List   Subjective Functional Status/Changes:  []  No changes reported     See POC          OBJECTIVE  5 min Gait Trainin feet with SPC R device on level surfaces with SB level of assistance; non-reciprocal stair navigation   Rationale: To improve ambulation safety and efficiency in order to improve patient's ability to safely ambulate at home for self care. Billed With/As:   [] TE   [] TA   [] Neuro   [] Self Care Patient Education: [x] Review HEP    [] Progressed/Changed HEP based on:   [] positioning   [] body mechanics   [] transfers   [x] heat/ice application    [x] other: pt encouraged to continue wearing brace, ice frequently, and obtain SPC for improved gait mechanics and safety. Other Objective/Functional Measures:    See POC    *Pt arrived 13' late for IE, no HEP provided today. Will develop and provide to patient at first f/u.      Post Treatment Pain Level (on 0 to 10) scale:   10  / 10     ASSESSMENT  Assessment/Changes in Function:     See POC     []  See Progress Note/Recertification   Patient will continue to benefit from skilled PT services to modify and progress therapeutic interventions, address functional mobility deficits, address ROM deficits, address strength deficits, analyze and address soft tissue restrictions, analyze and cue movement patterns, analyze and modify body mechanics/ergonomics, assess and modify postural abnormalities and instruct in home and community integration to attain remaining goals.    Progress toward goals / Updated goals:    See POC     PLAN  []  Upgrade activities as tolerated yes Continue plan of care   []  Discharge due to :    []  Other:      Therapist: Ivan Carrasco PT    Date: 5/6/2022 Time: 9:16 AM     Future Appointments   Date Time Provider [facility]   5/6/2022 10:45 AM Frankie Barrett PT [facility]   8/3/2022  8:30 AM Irina Harley MD [facility] AM[facility]

## 2022-05-09 DIAGNOSIS — M81.0 AGE-RELATED OSTEOPOROSIS WITHOUT CURRENT PATHOLOGICAL FRACTURE: ICD-10-CM

## 2022-05-11 ENCOUNTER — HOSPITAL ENCOUNTER (OUTPATIENT)
Dept: PHYSICAL THERAPY | Age: 67
Discharge: HOME | End: 2022-05-11
Payer: MEDICARE

## 2022-05-11 PROCEDURE — 97110 THERAPEUTIC EXERCISES: CPT

## 2022-05-11 PROCEDURE — 97140 MANUAL THERAPY 1/> REGIONS: CPT

## 2022-05-11 NOTE — PROGRESS NOTES
PHYSICAL THERAPY - DAILY TREATMENT NOTE    Patient Name: Criss Vyas        Date: 2022  : 1955   yes Patient  Verified  Visit #:   2   of   8  Insurance: Payor: Yvon Starkey / Plan: [address] [place] [address] HMO / Product Type: Managed Care Medicare /      In time: 3:00 Out time: 4:08   Total Treatment Time: 68     Medicare/BCBS Time Tracking (below)   Total Timed Codes (min):  58 1:1 Treatment Time:  45     TREATMENT AREA =  Pain in left knee [M25.562]    SUBJECTIVE  Pain Level (on 0 to 10 scale):  8  / 10   Medication Changes/New allergies or changes in medical history, any new surgeries or procedures?     yes  If yes, update Summary List   Subjective Functional Status/Changes:  []  No changes reported     My knee hurts the most on the inside, especially when I do too much standing or walking          OBJECTIVE  Modalities Rationale:     decrease inflammation and decrease pain to improve patient's ability to improve functional abilities    min [] Estim, type/location:                                      []  att     []  unatt     []  w/US     []  w/ice    []  w/heat    min []  Mechanical Traction: type/lbs                   []  pro   []  sup   []  int   []  cont    []  before manual    []  after manual    min []  Ultrasound, settings/location:      min []  Iontophoresis w/ dexamethasone, location:                                               []  take home patch       []  in clinic   10 min [x]  Ice     []  Heat    location/position:     min []  Vasopneumatic Device, press/temp:    If using vaso (only need to measure limb vaso being performed on)      pre-treatment girth :       post-treatment girth :       measured at (landmark location) :    Vasopnuematic compression justification:  Per bilateral girth measures taken and listed above the edema is considered significant and having an impact on the patient's     min []  Other:    [x] Skin assessment post-treatment (if applicable):    [x] intact    [x]  redness- no adverse reaction                  []redness  adverse reaction:      46  33  1:1 min Therapeutic Exercise:  [x]  See flow sheet   Rationale:      increase ROM, increase strength, improve balance and increase proprioception to improve the patients ability to improve functional abilities      12  1:1 min Manual Therapy: STM/tissue mobs to medial knee, popliteal fossa and distal hamstrings, knee flexion PROM in supine    Rationale:      decrease pain, increase ROM, increase tissue extensibility and decrease trigger points to improve patient's ability to improve functional mobility   The manual therapy interventions were performed at a separate and distinct time from the therapeutic activities interventions. Billed With/As:   [] TE   [] TA   [] Neuro   [] Self Care Patient Education: [x] Review HEP    [] Progressed/Changed HEP based on:   [] positioning   [] body mechanics   [] transfers   [] heat/ice application    [] other:      Other Objective/Functional Measures:  Verbal cueing for proper form/technique with various exercises during treatment today     Post Treatment Pain Level (on 0 to 10) scale:   6 / 10     ASSESSMENT  Assessment/Changes in Function:   Pt presented with chief c/o increased medial knee pain/symptoms with prolonged standing and walking ADL's. Pt also presented with fair tolerance of all new therex due to increased pain and apprehension with knee flexion at beginning of treatment with inability to tolerate [employer] squats at first, but able to perform after increased focus on knee flexion with scarlet step overs and gait training to increase knee flexion. Pt was also able to achieve good end range knee flexion mobility with passive and self flexion stretching today. Will continue to progress/advance patient within current POC as tolerated with monitoring symptoms.      []  See Progress Note/Recertification   Patient will continue to benefit from skilled PT services to modify and progress therapeutic interventions, address functional mobility deficits, address ROM deficits, address strength deficits, analyze and address soft tissue restrictions, analyze and cue movement patterns and instruct in home and community integration to attain remaining goals. Progress toward goals / Updated goals: · Short Term Goals: To be accomplished in  2  weeks:  1. Establish HEP to supplement PT tx and maximize outcomes 5/11/22; Met, established and issued today  2. Pt will be I and compliant with HEP for self management of sx. · Long Term Goals: To be accomplished in  4  weeks:  1. Improve L knee flexion AROM to at least 120 deg to improve gait mechanics and stair clearance  2. Improve L knee ext MMT to at least 3+/5 to improve knee stability while walking  3. Pt will report at least 65% reduction in pain level to improve activity tolerance  4.  Improve FOTO score to >/= 56/100 to indicate improved function     PLAN  [x]  Upgrade activities as tolerated yes Continue plan of care   []  Discharge due to :    []  Other:      Therapist: Juan Adams PTA    Date: 5/11/2022 Time: 2:43 PM     Future Appointments   Date Time Provider Zahra Hdez   5/11/2022  3:00 PM Krista Johnson PTA [facility]   5/13/2022  8:30 AM Krista Johnson PTA [facility]   5/20/2022 10:15 AM Aydee Lyons., PT [facility]   5/23/2022  1:00 PM Krista Johnson PTA [facility]   5/25/2022 11:30 AM Aydee Lyons., PT [facility]   5/31/2022 11:30 AM Krista Johnson PTA [facility]   6/2/2022 10:45 AM Krista Johnson PTA [facility]   8/3/2022  8:30 AM Josiah Puri MD [facility] AMB

## 2022-05-13 ENCOUNTER — HOSPITAL ENCOUNTER (OUTPATIENT)
Dept: PHYSICAL THERAPY | Age: 67
Discharge: HOME | End: 2022-05-13
Payer: MEDICARE

## 2022-05-13 PROCEDURE — 97110 THERAPEUTIC EXERCISES: CPT

## 2022-05-13 PROCEDURE — 97035 APP MDLTY 1+ULTRASOUND EA 15: CPT

## 2022-05-13 PROCEDURE — 97140 MANUAL THERAPY 1/> REGIONS: CPT

## 2022-05-13 NOTE — PROGRESS NOTES
PHYSICAL THERAPY - DAILY TREATMENT NOTE    Patient Name: Griselda Bazan        Date: 2022  : 1955   yes Patient  Verified  Visit #:   3   of   8  Insurance: Payor: Adams Rhodchon / Plan: [address] [place] [address] HMO / Product Type: Managed Care Medicare /      In time: 8:30 Out time: 9:33   Total Treatment Time: 63     Medicare/BCBS Time Tracking (below)   Total Timed Codes (min):  53 1:1 Treatment Time:  53     TREATMENT AREA =  Pain in left knee [M25.562]    SUBJECTIVE  Pain Level (on 0 to 10 scale):  8  / 10   Medication Changes/New allergies or changes in medical history, any new surgeries or procedures? yes  If yes, update Summary List   Subjective Functional Status/Changes:  []  No changes reported     \"I was so sore after my last visit from the manual I could barely move. I have been doing my home exercises. \"       OBJECTIVE  Modalities Rationale:     decrease inflammation and decrease pain to improve patient's ability to improve functional abilities    min [] Estim, type/location:                                      []  att     []  unatt     []  w/US     []  w/ice    []  w/heat    min []  Mechanical Traction: type/lbs                   []  pro   []  sup   []  int   []  cont    []  before manual    []  after manual   8 min [x]  Ultrasound, settings/location:  1 MHz, 50%, 1.5w/cm2    min []  Iontophoresis w/ dexamethasone, location:                                               []  take home patch       []  in clinic   10 min [x]  Ice     []  Heat    location/position: Reclined; to L knee    min []  Vasopneumatic Device, press/temp:    If using vaso (only need to measure limb vaso being performed on)      pre-treatment girth :       post-treatment girth :       measured at (landmark location) :    Vasopnuematic compression justification:  Per bilateral girth measures taken and listed above the edema is considered significant and having an impact on the patient's     min []  Other: [x] Skin assessment post-treatment (if applicable):    [x]  intact    [x]  redness- no adverse reaction                  []redness - adverse reaction:      35 min Therapeutic Exercise:  [x]  See flow sheet   Rationale:      increase ROM, increase strength, improve balance and increase proprioception to improve the patients ability to improve functional abilities      10 min Manual Therapy: STM/tissue mobs to medial knee, popliteal fossa and distal hamstrings, knee flexion PROM in supine    Rationale:      decrease pain, increase ROM, increase tissue extensibility and decrease trigger points to improve patient's ability to improve functional mobility   The manual therapy interventions were performed at a separate and distinct time from the therapeutic activities interventions. Billed With/As:   [] TE   [] TA   [] Neuro   [] Self Care Patient Education: [x] Review HEP    [] Progressed/Changed HEP based on:   [] positioning   [] body mechanics   [] transfers   [] heat/ice application    [x] other: Provided OTB for hip abd at home per pt request.     Other Objective/Functional Measures:    Modified TE per flow sheet secondary to pain levels. Post Treatment Pain Level (on 0 to 10) scale:  6  / 10     ASSESSMENT  Assessment/Changes in Function:    Pt cont to present with high pain levels with [address] activities and with knee flex. Unable to perform TG squats due to significant increase in medial knee pain. Trial of US to med knee jt; will assess response NV.     []  See Progress Note/Recertification   Patient will continue to benefit from skilled PT services to modify and progress therapeutic interventions, address functional mobility deficits, address ROM deficits, address strength deficits, analyze and address soft tissue restrictions, analyze and cue movement patterns and instruct in home and community integration to attain remaining goals. Progress toward goals / Updated goals: · Short Term Goals:  To be accomplished in  2  weeks:  1. Establish HEP to supplement PT tx and maximize outcomes 5/11/22; Met, established and issued today  2. Pt will be I and compliant with HEP for self management of sx. 5/13/22; reports compliance  · Long Term Goals: To be accomplished in  4  weeks:  1. Improve L knee flexion AROM to at least 120 deg to improve gait mechanics and stair clearance  2. Improve L knee ext MMT to at least 3+/5 to improve knee stability while walking  3. Pt will report at least 65% reduction in pain level to improve activity tolerance  4.  Improve FOTO score to >/= 56/100 to indicate improved function     PLAN  [x]  Upgrade activities as tolerated yes Continue plan of care   []  Discharge due to :    []  Other:      Therapist: Maite Edmonds PTA    Date: 5/13/2022 Time: 2:43 PM     Future Appointments   Date Time Provider Zahra Hdez   5/13/2022  8:30 AM [facility] PT [place] [facility]   5/20/2022 10:15 AM Eletha Peed., PT [facility]   5/23/2022  1:00 PM Mariza Cadet, PTA [facility]   5/25/2022 11:30 AM Eletha Peed., PT [facility]   5/31/2022 11:30 AM Mariza Cadet, PTA [facility]   6/2/2022 10:45 AM Mariza Cadet, PTA [facility]   8/3/2022  8:30 AM Vincent Rivera MD [facility] AM[facility]

## 2022-05-18 ENCOUNTER — APPOINTMENT (OUTPATIENT)
Dept: PHYSICAL THERAPY | Age: 67
End: 2022-05-18
Payer: MEDICARE

## 2022-05-20 ENCOUNTER — HOSPITAL ENCOUNTER (OUTPATIENT)
Dept: PHYSICAL THERAPY | Age: 67
Discharge: HOME | End: 2022-05-20
Payer: MEDICARE

## 2022-05-20 PROCEDURE — 97110 THERAPEUTIC EXERCISES: CPT

## 2022-05-20 PROCEDURE — 97140 MANUAL THERAPY 1/> REGIONS: CPT

## 2022-05-20 NOTE — PROGRESS NOTES
PHYSICAL THERAPY - DAILY TREATMENT NOTE    Patient Name: Suzette Post        Date: 2022  : 1955   yes Patient  Verified  Visit #:   4   of   8  Insurance: Payor: Sriram Brandt / Plan: [address] [place] [address] HMO / Product Type: Managed Care Medicare /      In time: 10:12 Out time: 11:13   Total Treatment Time: 61     Medicare/BCBS Time Tracking (below)   Total Timed Codes (min):  51 1:1 Treatment Time:  51     TREATMENT AREA =  Pain in left knee [M25.562]    SUBJECTIVE  Pain Level (on 0 to 10 scale):  7  / 10   Medication Changes/New allergies or changes in medical history, any new surgeries or procedures? yes  If yes, update Summary List   Subjective Functional Status/Changes:  []  No changes reported     Pt states she had less pain after last session; possibly related to US treatment. She woke up the next day with increased knee pain again which she describes as \"burning\". States her pain does not seem to improve since [facility]. Pt wearing brace all the time.  States she will have a rare click, denies locking, occasional buckling       OBJECTIVE  Modalities Rationale:     decrease inflammation and decrease pain to improve patient's ability to improve functional abilities    min [] Estim, type/location:                                      []  att     []  unatt     []  w/US     []  w/ice    []  w/heat    min []  Mechanical Traction: type/lbs                   []  pro   []  sup   []  int   []  cont    []  before manual    []  after manual   ND min [x]  Ultrasound, settings/location:  1 MHz, 50%, 1.5w/cm2    min []  Iontophoresis w/ dexamethasone, location:                                               []  take home patch       []  in clinic   10 min [x]  Ice     []  Heat    location/position: Reclined; to L knee    min []  Vasopneumatic Device, press/temp:        min []  Other:    [x] Skin assessment post-treatment (if applicable):    [x]  intact    [x]  redness- no adverse reaction []redness  adverse reaction:      41 min Therapeutic Exercise:  [x]  See flow sheet   Rationale:      increase ROM, increase strength, improve balance and increase proprioception to improve the patients ability to improve functional abilities      10 min Manual Therapy: Patellar mobs grade 3-4 all planes. Grade 3 P/A tibio-femoral joint glides. Knee flexion PROM in supine with concurrent tibial IR/neutral alignment and with concurrent OP to joint line/meniscus. Candice Peach \"I\" strip to medial joint line; pt ed on safe removal technique and precautions associated with tape   Rationale:      decrease pain, increase ROM, increase tissue extensibility and decrease trigger points to improve patient's ability to improve functional mobility   The manual therapy interventions were performed at a separate and distinct time from the therapeutic activities interventions. Billed With/As:   [x] TE   [] TA   [] Neuro   [] Self Care Patient Education: [x] Review HEP    [] Progressed/Changed HEP based on:   [] positioning   [] body mechanics   [] transfers   [] heat/ice application    [x] other: Pt ed on avoiding hip/tibial ER in resting positions (I.e. supine or legs on couch)     Other Objective/Functional Measures:  Knee flexion AROM post treatment 109 deg with end range pain    -added LAQ with adductor ball squeeze  -attempted ham curl, however pain to medial knee; regressed to ham iso seated with heavy cues  -added tibial IR/ER seated and heel slides seated incorporating tibial IR/ER with pt noting a click once with medial meniscal tension.  -added SLR 4 way, however unable to perform into ext and modified to TKE     Post Treatment Pain Level (on 0 to 10) scale:  6 / 10     ASSESSMENT  Assessment/Changes in Function:    Pt treatment modified today to table exercises due to pt c/o ongoing pain limitation especially in wb'ing.  Pt was able to advance adductor PRE from iso in h/l to iso activation with SAQ, LAQ and bridges as well as perform s/l adduction with good tolerance until last rep where she decreased quad activation causing medial knee pain. Pt reports improved tolerance for knee flexion with tibial IR. (+) click with tibial ER. Pt demonstrates excellent improvement in knee flexion AROM since SOC (See LTG 1). Plan to continue progression of knee strength for increased stability in wb'ing and decreased reliance on knee brace for ADL's and ambulation. NV assess response to today's session and modify treatment appropriately (I.e. return to wb'ing exercises vs mat table or use of Ktape vs ultrasound for pain modification/tissue healing). []  See Progress Note/Recertification   Patient will continue to benefit from skilled PT services to modify and progress therapeutic interventions, address functional mobility deficits, address ROM deficits, address strength deficits, analyze and address soft tissue restrictions, analyze and cue movement patterns and instruct in home and community integration to attain remaining goals. Progress toward goals / Updated goals: · Short Term Goals: To be accomplished in  2  weeks:  1. Establish HEP to supplement PT tx and maximize outcomes 5/11/22; Met, established and issued today  2. Pt will be I and compliant with HEP for self management of sx. 5/13/22; reports compliance  · Long Term Goals: To be accomplished in  4  weeks:  1. Improve L knee flexion AROM to at least 120 deg to improve gait mechanics and stair clearance-5/20: goal progressing; 109 p!  2. Improve L knee ext MMT to at least 3+/5 to improve knee stability while walking  3. Pt will report at least 65% reduction in pain level to improve activity tolerance  4. Improve FOTO score to >/= 56/100 to indicate improved function     PLAN  [x]  Upgrade activities as tolerated yes Continue plan of care   []  Discharge due to :    []  Other:      Therapist: Ernestina Johnson.  Anahi PT    Date: 5/20/2022 Time: 11:14 AM      Future Appointments   Date Time Provider Zahra Hdez   5/20/2022 10:15 AM Madison Whelan., PT [facility]   5/23/2022  1:00 PM Kelin Clifton [facility]   5/25/2022 11:30 AM Madison Whelan., PT [facility]   5/31/2022 11:30 AM Tony Hunter, PTA [facility]   6/2/2022 10:45 AM Tony Hunter, PTA [facility]   8/3/2022  8:30 AM MD JASE Marino AMB

## 2022-05-23 ENCOUNTER — HOSPITAL ENCOUNTER (OUTPATIENT)
Dept: PHYSICAL THERAPY | Age: 67
Discharge: HOME | End: 2022-05-23
Payer: MEDICARE

## 2022-05-23 PROCEDURE — 97140 MANUAL THERAPY 1/> REGIONS: CPT

## 2022-05-23 PROCEDURE — 97110 THERAPEUTIC EXERCISES: CPT

## 2022-05-23 NOTE — PROGRESS NOTES
PHYSICAL THERAPY - DAILY TREATMENT NOTE    Patient Name: Xochilt Oakes        Date: 2022  : 1955   yes Patient  Verified  Visit #:   5   of   8  Insurance: Payor: Marlene Shaunaor / Plan: [address] [place] [address] HMO / Product Type: Managed Care Medicare /      In time: 1:03 Out time: 2:09   Total Treatment Time: 66     Medicare/BCBS Time Tracking (below)   Total Timed Codes (min):  56 1:1 Treatment Time:  42     TREATMENT AREA =  Pain in left knee [M25.562]    SUBJECTIVE  Pain Level (on 0 to 10 scale):  8  / 10   Medication Changes/New allergies or changes in medical history, any new surgeries or procedures?    no  If yes, update Summary List   Subjective Functional Status/Changes:  []  No changes reported     My knee has been hurting more on and off since I was here last and I just pretty much took it easy over the weekend and didn't really do anything except for the exercises that you guys gave me.           OBJECTIVE  Modalities Rationale:     decrease inflammation and decrease pain to improve patient's ability to improve functional abilities    min [] Estim, type/location:                                      []  att     []  unatt     []  w/US     []  w/ice    []  w/heat    min []  Mechanical Traction: type/lbs                   []  pro   []  sup   []  int   []  cont    []  before manual    []  after manual    min []  Ultrasound, settings/location:      min []  Iontophoresis w/ dexamethasone, location:                                               []  take home patch       []  in clinic   10 min [x]  Ice     []  Heat    location/position: L knee/reclined     min []  Vasopneumatic Device, press/temp:    If using vaso (only need to measure limb vaso being performed on)      pre-treatment girth :       post-treatment girth :       measured at (landmark location) :    Vasopnuematic compression justification:  Per bilateral girth measures taken and listed above the edema is considered significant and having an impact on the patient's     min []  Other:    [x] Skin assessment post-treatment (if applicable):    [x]  intact    [x]  redness- no adverse reaction                  []redness - adverse reaction:      44  30  1:1 min Therapeutic Exercise:  [x]  See flow sheet   Rationale:      increase ROM, increase strength, improve balance and increase proprioception to improve the patients ability to improve functional abilities      12  1:1 min Manual Therapy: Patella mobs, STM/tissue mobs to distal quads,medial knee, popliteal fossa and distal hamstrings,manual hamstring stretching knee flexion PROM in supine    Rationale:      decrease pain, increase ROM, increase tissue extensibility and decrease trigger points to improve patient's ability to improve functional mobility   The manual therapy interventions were performed at a separate and distinct time from the therapeutic activities interventions. Billed With/As:   [] TE   [] TA   [] Neuro   [] Self Care Patient Education: [x] Review HEP    [] Progressed/Changed HEP based on:   [] positioning   [] body mechanics   [] transfers   [] heat/ice application    [] other:      Other Objective/Functional Measures:  Verbal cueing for proper form/technique with various exercises during treatment today  Started recumbent bike but held some standing therex due to increased pain/instability/buckling    Post Treatment Pain Level (on 0 to 10) scale:   6  / 10     ASSESSMENT  Assessment/Changes in Function:   Pt presented with increased antalgic gait entering treatment today with chief c/o intermittent instability with buckling into extension with normal ambulation ADL's since last treatment. Pt was able to advance to addition of recumbent bike, but had very limited tolerance of standing therex today due to intermittent increase in pain as well as instability/buckling into extension. Will continue to progress/advance patient within current POC as tolerated with monitoring symptoms. []  See Progress Note/Recertification   Patient will continue to benefit from skilled PT services to modify and progress therapeutic interventions, address functional mobility deficits, address ROM deficits, address strength deficits, analyze and address soft tissue restrictions, analyze and cue movement patterns and instruct in home and community integration to attain remaining goals. Progress toward goals / Updated goals: · Short Term Goals: To be accomplished in  2  weeks:  1. Establish HEP to supplement PT tx and maximize outcomes 5/11/22; Met, established and issued today  2. Pt will be I and compliant with HEP for self management of sx. 5/13/22; reports compliance  · Long Term Goals: To be accomplished in  4  weeks:  1. Improve L knee flexion AROM to at least 120 deg to improve gait mechanics and stair clearance-5/20: goal progressing; 109 p!  2. Improve L knee ext MMT to at least 3+/5 to improve knee stability while walking  3. Pt will report at least 65% reduction in pain level to improve activity tolerance  4.  Improve FOTO score to >/= 56/100 to indicate improved function     PLAN  [x]  Upgrade activities as tolerated yes Continue plan of care   []  Discharge due to :    []  Other:      Therapist: Emilie Akers PTA    Date: 5/23/2022 Time: 1:00 PM     Future Appointments   Date Time Provider Zahra Hdez   5/25/2022 11:30 AM Amrita Roe, PT [facility]   5/31/2022 11:30 AM Brody Rodney PTA [facility]   6/2/2022 10:45 AM Brody Rodney PTA [facility]   8/3/2022  8:30 AM Ethan Mc MD [facility]

## 2022-05-25 ENCOUNTER — HOSPITAL ENCOUNTER (OUTPATIENT)
Dept: PHYSICAL THERAPY | Age: 67
Discharge: HOME | End: 2022-05-25
Payer: MEDICARE

## 2022-05-25 PROCEDURE — 97035 APP MDLTY 1+ULTRASOUND EA 15: CPT

## 2022-05-25 PROCEDURE — 97110 THERAPEUTIC EXERCISES: CPT

## 2022-05-25 PROCEDURE — 97140 MANUAL THERAPY 1/> REGIONS: CPT

## 2022-05-25 NOTE — PROGRESS NOTES
PHYSICAL THERAPY - DAILY TREATMENT NOTE    Patient Name: Joseph Alcantara        Date: 2022  : 1955   yes Patient  Verified  Visit #:   6   of   8  Insurance: Payor: Freddy Santana / Plan: [address] [place] [address] HMO / Product Type: Managed Care Medicare /      In time: 11:32 Out time: 12:38   Total Treatment Time: 66     Medicare/BCBS Time Tracking (below)   Total Timed Codes (min):  48 1:1 Treatment Time:  38     TREATMENT AREA =  Pain in left knee [M25.562]    SUBJECTIVE  Pain Level (on 0 to 10 scale):  7  / 10   Medication Changes/New allergies or changes in medical history, any new surgeries or procedures?    no  If yes, update Summary List   Subjective Functional Status/Changes:  []  No changes reported   Pt states she had pain increase for the rest of the day after PT. \"When I lay down it's fine, when I get up to walk that's when it's the worst.\" Pt states the [facility] has helped reduce her pain to medial knee \"it's great\", now pain is more to back of knee/leg.  Continues to have buckling/clicking to knee       OBJECTIVE  Modalities Rationale:     decrease inflammation and decrease pain to improve patient's ability to improve functional abilities    min [] Estim, type/location:                                      []  att     []  unatt     []  w/US     []  w/ice    []  w/heat    min []  Mechanical Traction: type/lbs                   []  pro   []  sup   []  int   []  cont    []  before manual    []  after manual   8 min [x]  Ultrasound, settings/location:  1.0 w/cm2, 50%, 1 MHz to L knee, medial joint A/P in prone with towel to thigh    min []  Iontophoresis w/ dexamethasone, location:                                               []  take home patch       []  in clinic   10 min [x]  Ice     []  Heat    location/position: L knee/reclined     min []  Vasopneumatic Device, press/temp:        min []  Other:    [x] Skin assessment post-treatment (if applicable):    [x]  intact    [x]  redness- no adverse reaction                  []redness  adverse reaction:      28  1:1  (38) min Therapeutic Exercise:  [x]  See flow sheet   Rationale:      increase ROM, increase strength, improve balance and increase proprioception to improve the patients ability to improve functional abilities      10  1:1 min Manual Therapy: STM to distal hamstrings, proximal gastrocs. Grade3-4 A/P tibio-femoral mobs for knee ext. Prone knee flex/ext with concurrent tibial IR (also wiggle) and meniscal OP anteriorly during flexion. A/P proximal fibular head mobs in h/l. Rationale:      decrease pain, increase ROM, increase tissue extensibility and decrease trigger points to improve patient's ability to improve functional mobility   The manual therapy interventions were performed at a separate and distinct time from the therapeutic activities interventions. Billed With/As:   [x] TE   [] TA   [] Neuro   [] Self Care Patient Education: [x] Review HEP    [] Progressed/Changed HEP based on:   [] positioning   [] body mechanics   [] transfers   [] heat/ice application    [x] other: pt ed on benefit to obtain ww 2/2 wb'ing about 40-50% on RUE using SPC; she states she will be able to get one for walking activity     Other Objective/Functional Measures:  -maintained to table exercises today 2/2 pain with wb'ing  -cues to maintain QS during 4 way SLR to avoid c/o \"click\" and pain; noted clicking with decreased quad activity; required min assist for SLR flexion   Post Treatment Pain Level (on 0 to 10) scale:   6  / 10     ASSESSMENT  Assessment/Changes in Function:   Pain with inferior patellar glide, palpation of patellar tendon, med/lat gastroc head, distal medial hamstrings, proximal post fibular head. Resumed US treatment to address increased swelling noted to popliteal fossa and promote tissue healing to medial knee (MCL/meniscus). Consider continuing US x 1-2 more trials and if no significant benefit will plan to d/c.  Overall pt noting slow, steady improvement in knee pain, however given ongoing buckling, clicking and pain with wb'ing will require skilled progression of strengthening to attain LTG's. Focus on reduction of stress to MCL and medial meniscus. []  See Progress Note/Recertification   Patient will continue to benefit from skilled PT services to modify and progress therapeutic interventions, address functional mobility deficits, address ROM deficits, address strength deficits, analyze and address soft tissue restrictions, analyze and cue movement patterns and instruct in home and community integration to attain remaining goals. Progress toward goals / Updated goals: · Short Term Goals: To be accomplished in  2  weeks:  1. Establish HEP to supplement PT tx and maximize outcomes 5/11/22; Met, established and issued today  2. Pt will be I and compliant with HEP for self management of sx. 5/13/22; reports compliance  · Long Term Goals: To be accomplished in  4  weeks:  1. Improve L knee flexion AROM to at least 120 deg to improve gait mechanics and stair clearance-5/20: goal progressing; 109 p!  2. Improve L knee ext MMT to at least 3+/5 to improve knee stability while walking  3. Pt will report at least 65% reduction in pain level to improve activity tolerance-5/25: goal progressing, reports 20-30% improvement  4. Improve FOTO score to >/= 56/100 to indicate improved function     PLAN  [x]  Upgrade activities as tolerated yes Continue plan of care   []  Discharge due to :    []  Other:      Therapist: Mary Lou Bryant, PT    Date: 5/25/2022 Time: 12:38 PM      Future Appointments   Date Time Provider Zahra Hdez   5/25/2022 11:30 AM Lyndsey Smalls., PT [facility]   5/31/2022 11:30 AM Patricia Lynne PTA [facility]   6/2/2022 10:45 AM Patricia Lynne PTA [facility]   8/3/2022  8:30 AM Dari Lopez MD [facility]

## 2022-05-31 ENCOUNTER — HOSPITAL ENCOUNTER (OUTPATIENT)
Dept: PHYSICAL THERAPY | Age: 67
Discharge: HOME | End: 2022-05-31
Payer: MEDICARE

## 2022-05-31 PROCEDURE — 97140 MANUAL THERAPY 1/> REGIONS: CPT

## 2022-05-31 PROCEDURE — 97110 THERAPEUTIC EXERCISES: CPT

## 2022-05-31 PROCEDURE — 97035 APP MDLTY 1+ULTRASOUND EA 15: CPT

## 2022-05-31 NOTE — PROGRESS NOTES
PHYSICAL THERAPY - DAILY TREATMENT NOTE    Patient Name: Bran Alberto        Date: 2022  : 1955   yes Patient  Verified  Visit #:   7   of   8  Insurance: Payor: Dino Plate / Plan: [address] [place] [address] HMO / Product Type: Managed Care Medicare /      In time: 11:26 Out time: 12:30   Total Treatment Time: 64     Medicare/BCBS Time Tracking (below)   Total Timed Codes (min):  56 1:1 Treatment Time:  56     TREATMENT AREA =  Pain in left knee [M25.562]    SUBJECTIVE  Pain Level (on 0 to 10 scale): 6/ 10   Medication Changes/New allergies or changes in medical history, any new surgeries or procedures?    no  If yes, update Summary List   Subjective Functional Status/Changes:  []  No changes reported     My knee did feel a little bit better for a couple of days after holding off on the standing exercises as well as doing the ultrasound. I mainly just did a little bit of walking around the house this weekend.           OBJECTIVE  Modalities Rationale:     decrease inflammation and decrease pain to improve patient's ability to improve functional abilities    min [] Estim, type/location:                                      []  att     []  unatt     []  w/US     []  w/ice    []  w/heat    min []  Mechanical Traction: type/lbs                   []  pro   []  sup   []  int   []  cont    []  before manual    []  after manual   8 min [x]  Ultrasound, settings/location:      min []  Iontophoresis w/ dexamethasone, location:                                               []  take home patch       []  in clinic   8 min [x]  Ice     []  Heat    location/position: L knee/supine 1.0 w/cm2, 50%, 1 MHz to L knee, medial knee/joint A/P in supine with towel under knee    min []  Vasopneumatic Device, press/temp:    If using vaso (only need to measure limb vaso being performed on)      pre-treatment girth :       post-treatment girth :       measured at (landmark location) :    Vasopnuematic compression justification:  Per bilateral girth measures taken and listed above the edema is considered significant and having an impact on the patient's     min []  Other:    [x] Skin assessment post-treatment (if applicable):    [x]  intact    [x]  redness- no adverse reaction                  []redness - adverse reaction:      38  1:1 min Therapeutic Exercise:  [x]  See flow sheet   Rationale:      increase ROM, increase strength, improve balance and increase proprioception to improve the patients ability to improve functional abilities     10  1:1 min Manual Therapy: STM/tissue mobs to distal hamstrings and calf in prone, long axis LE distraction, proximal fibular head A>P mobs and knee flexion PROM in supine    Rationale:      decrease pain, increase ROM, increase tissue extensibility and decrease trigger points to improve patient's ability to improve functional mobility   The manual therapy interventions were performed at a separate and distinct time from the therapeutic activities interventions. Billed With/As:   [] TE   [] TA   [] Neuro   [] Self Care Patient Education: [x] Review HEP    [] Progressed/Changed HEP based on:   [] positioning   [] body mechanics   [] transfers   [] heat/ice application    [] other:      Other Objective/Functional Measures:  Verbal cueing for proper form/technique with various exercises during treatment today  Min assist with sidelying abduction      Post Treatment Pain Level (on 0 to 10) scale:   5-6  / 10     ASSESSMENT  Assessment/Changes in Function:   Pt did report slight benefit with decreased frequency and intensity of medial knee pain/symptoms with scaling back/holding standing therex last visit as well as addition of ultrasound. Will review detailed progress/goals for physician update/possible discharge summary next treatment with continuing to progress/advance within current POC/protocol as tolerated.      []  See Progress Note/Recertification   Patient will continue to benefit from skilled PT services to modify and progress therapeutic interventions, address functional mobility deficits, address ROM deficits, address strength deficits, analyze and address soft tissue restrictions, analyze and cue movement patterns and instruct in home and community integration to attain remaining goals. Progress toward goals / Updated goals: · Short Term Goals: To be accomplished in  2  weeks:  1. Establish HEP to supplement PT tx and maximize outcomes 5/11/22; Met, established and issued today  2. Pt will be I and compliant with HEP for self management of sx. 5/13/22; reports compliance  · Long Term Goals: To be accomplished in  4  weeks:  1. Improve L knee flexion AROM to at least 120 deg to improve gait mechanics and stair clearance-5/20: goal progressing; 109 p!  2. Improve L knee ext MMT to at least 3+/5 to improve knee stability while walking  3. Pt will report at least 65% reduction in pain level to improve activity tolerance-5/25: goal progressing, reports 20-30% improvement  4.  Improve FOTO score to >/= 56/100 to indicate improved function     PLAN  [x]  Upgrade activities as tolerated yes Continue plan of care   []  Discharge due to :    []  Other:      Therapist: Ricardo De Leon PTA    Date: 5/31/2022 Time: 11:12 AM     Future Appointments   Date Time Provider Zahra Hdez   5/31/2022 11:30 AM Aleksandra Pedroza PTA [facility]   6/2/2022 10:45 AM Aleksandra Pedroza PTA [facility]   8/3/2022  8:30 AM Funmilayo Fischer MD A[facility]

## 2022-06-02 ENCOUNTER — APPOINTMENT (OUTPATIENT)
Dept: PHYSICAL THERAPY | Age: 67
End: 2022-06-02
Payer: MEDICARE

## 2022-06-03 ENCOUNTER — HOSPITAL ENCOUNTER (OUTPATIENT)
Dept: PHYSICAL THERAPY | Age: 67
Discharge: HOME | End: 2022-06-03
Payer: MEDICARE

## 2022-06-03 PROCEDURE — 97110 THERAPEUTIC EXERCISES: CPT

## 2022-06-03 PROCEDURE — 97530 THERAPEUTIC ACTIVITIES: CPT

## 2022-06-03 NOTE — PROGRESS NOTES
PHYSICAL THERAPY - DAILY TREATMENT NOTE    Patient Name: Navdeep Phillips        Date: 6/3/2022  : 1955   yes Patient  Verified  Visit #:   8   of   8  Insurance: Payor: Keegan Templeton / Plan: [address] [place] [address] HMO / Product Type: Managed Care Medicare /      In time: 7:46 Out time: 8:54   Total Treatment Time: 68     Medicare/BCBS Time Tracking (below)   Total Timed Codes (min):  58 1:1 Treatment Time:  44     TREATMENT AREA =  Pain in left knee [M25.562]    SUBJECTIVE  Pain Level (on 0 to 10 scale):  7  / 10   Medication Changes/New allergies or changes in medical history, any new surgeries or procedures?    no  If yes, update Summary List   Subjective Functional Status/Changes:  []  No changes reported     My knee is still the same as far as the pain, I think that I really need to go back to the doctor to get the MRI to find out what is going on with my knee, because I feel like something is preventing it from getting better.            OBJECTIVE  Modalities Rationale:     decrease inflammation and decrease pain to improve patient's ability to improve functional abilities    min [] Estim, type/location:                                      []  att     []  unatt     []  w/US     []  w/ice    []  w/heat    min []  Mechanical Traction: type/lbs                   []  pro   []  sup   []  int   []  cont    []  before manual    []  after manual    min []  Ultrasound, settings/location:      min []  Iontophoresis w/ dexamethasone, location:                                               []  take home patch       []  in clinic   10 min [x]  Ice     []  Heat    location/position: L knee/reclined    min []  Vasopneumatic Device, press/temp:    If using vaso (only need to measure limb vaso being performed on)      pre-treatment girth :       post-treatment girth :       measured at (landmark location) :    Vasopnuematic compression justification:  Per bilateral girth measures taken and listed above the edema is considered significant and having an impact on the patient's     min []  Other:    [x] Skin assessment post-treatment (if applicable):    [x]  intact    [x]  redness- no adverse reaction                  []redness - adverse reaction:      43  29  1:1 min Therapeutic Exercise:  [x]  See flow sheet   Rationale:      increase ROM, increase strength, improve balance and increase proprioception to improve the patients ability to improve functional abilities        15  1:1 min Therapeutic Activity: [x]  See flow sheet   Rationale: Through reassessment of all established goals including mobility and strength reassessment for progress note    Billed With/As:   [] TE   [] TA   [] Neuro   [] Self Care Patient Education: [x] Review HEP    [] Progressed/Changed HEP based on:   [] positioning   [] body mechanics   [] transfers   [] heat/ice application    [] other:      Other Objective/Functional Measures:         Post Treatment Pain Level (on 0 to 10) scale:   5  / 10     ASSESSMENT  Assessment/Changes in Function:   See Progress note/Physician update for full detailed progress towards established goals. [x]  See Progress Note/Recertification   Patient will continue to benefit from skilled PT services to modify and progress therapeutic interventions, address functional mobility deficits, address ROM deficits, address strength deficits, analyze and address soft tissue restrictions, analyze and cue movement patterns and instruct in home and community integration to attain remaining goals. Progress toward goals / Updated goals:  See Progress note/Physician update for full detailed progress towards established goals.      PLAN  [x]  Upgrade activities as tolerated yes Continue plan of care   []  Discharge due to :    []  Other:      Therapist: Danelle Espinoza PTA    Date: 6/3/2022 Time: 7:53 AM     Future Appointments   Date Time Provider Zahra Hdez   8/3/2022  8:30 AM Oseas Guerrero MD [facility]

## 2022-06-03 NOTE — PROGRESS NOTES
[address] [place] [address] PHYSICAL THERAPY  [facility] [address], [place], [address] - Phone: [phone]  Fax: [phone]  PROGRESS NOTE  Patient Name: Mickey Gonzalez : 1955   Treatment/Medical Diagnosis: Pain in left knee [M25.562]   Referral Source: Ethan Mc MD     Date of Initial Visit: 22 Attended Visits: 8 Missed Visits: 0     SUMMARY OF TREATMENT  Therapeutic exercise for LE/knee focused mobility and strengthening, LE biomechanical symmetry, proprioceptive/balance awareness, manual intervention,ultrasound, patient education, cryotherapy and HEP    CURRENT STATUS  Pt reports 65% overall improvement in sx since beginning care. Pt reports 7-8/10 max pain, 6-7/10 avg pain. Pain made worse with prolonged walking > 15-20 minutes and increased bending/squatting type ADL's as well as with descending stairs. Pt is making slow but steady progress with gaining knee mobility as well as advancing with strengthening within current POC. However, she has been unable to tolerate standing strengthening exercises due to increased medial knee pain as well as intermittent instability/buckling over the past 3 visits. Pt would benefit from further diagnostic testing to rule out any further underlying causes/pathologies preventing progression/improvement    Improvements: Pt reports the most functional improvement with increased knee mobility/decreased stiffness compared to at initial evaluation/before starting therapy, increased activity tolerance and ability to ambulate at all times without straight cane. Remaining functional limitations: Increased limitations/pain/symptoms with prolonged walking > 15-20 minutes and increased bending/squatting type ADL's as well as with descending stairs.      Objective Measurements:  L knee AROM= 0-110 deg; PROM= 0-125 deg (measured in supine)   Strength measurements/MMT= Hip Abduction= 3+/5; KNEE= Quads= 4/5 p!; Hamstrings= 4/5 p!    FOTO 40/100    Goal/Measure of Progress Goal Met? 1.    Establish HEP to supplement PT tx and maximize outcomes     Status at last Eval: To be established and issued at first follow up visit Current Status: Established and issued at first follow up visit yes   2. Pt will be I and compliant with HEP for self management of sx. Status at last Eval: Established and issued at first follow up visit Current Status: Pt reports independence and compliance with HEP 2x/day yes   3. Improve L knee flexion AROM to at least 120 deg to improve gait mechanics and stair clearance   Status at last Eval: 84 deg p! Current Status: 110 deg progress     Goal/Measure of Progress Goal Met? 4. Improve L knee ext MMT to at least 3+/5 to improve knee stability while walking   Status at last Eval: 3/5 Current Status: 4/5 p! yes   5. Pt will report at least 65% reduction in pain level to improve activity tolerance   Status at last Eval: n/a Current Status: 65% reduction reported yes   5. Improve FOTO score to >/= 56/100 to indicate improved function   Status at last Eval: 21/100 Current Status: 40/100 progress     New Goals to be achieved in __8__  treatments:  1. Improve L knee flexion AROM to at least 120 deg to improve gait mechanics and stair clearance  2. Improve FOTO score to >/= 56/100 to indicate improved function  3. Pt will report =/> 75% reduction in pain/symptoms with prolonged walking > 15-20 minutes and increased bending/squatting type ADL's as well as with descending stairs. 4. Decreased max pain level to =/> 4/10 for increased function with ADL's.   5. Increased L hip abduction strength to =/> 4/5 to help unload pressure off medial knee with prolonged standing/walking ADL's. RECOMMENDATIONS  Pt placed on hold while she awaits f/u apt with physician.  Will plan to continue with current POC for 8 additional visits with advancing as tolerated pending results of MD f/u with further diagnostic testing to rule out any further underlying causes, then reassess for the need for continuation or discharge from therapy. If you have any questions/comments please contact us directly at [phone]. Thank you for allowing us to assist in the care of your patient. Therapist Signature: Jaky Ly PTA Date: 6/3/2022    Irma Pritchard PT, DPT, CMTPT Time: 7:57 AM   NOTE TO PHYSICIAN:  PLEASE COMPLETE THE ORDERS BELOW AND FAX TO   [facility]: [phone]  If you are unable to process this request in 24 hours please contact our office: [phone]    ___ I have read the above report and request that my patient continue as recommended.   ___ I have read the above report and request that my patient continue therapy with the following changes/special instructions:_________________________________________________________   ___ I have read the above report and request that my patient be discharged from therapy.      Physician Signature:        Date:       Time:       Leyda Garza MD

## 2022-08-08 NOTE — PROGRESS NOTES
[address] [facility]YS[facility]ERAP[facility] AT [address], [address] [place] [address]  Phone: [phone]   [phone]    DISCHARGE NOTE  Patient Name: Dl Singer : 1955   Treatment/Medical Diagnosis: Pain in left knee [M25.562]   Referral Source: Yecenia Paniagua MD     Date of Initial Visit: 22 Attended Visits: 8 Missed Visits: 7       SUMMARY OF TREATMENT  Pt attended initial evaluation and 7 follow-up appointments. Pt was contacted to schedule more follow up visits after last visit, but requested to be self discharged at that time stating that \"she felt fine and did not wish to continue with therapy. \" A formal reassessment of goals/re certification was just performed at her last follow up visit. See progress note/re certification dated 14 for full final detailed progress towards all established goals. RECOMMENDATIONS  Discontinue physical therapy due to patient not returning. If you have any questions/comments please contact us directly at [phone]. Thank you for allowing us to assist in the care of your patient.     Therapist Signature: Diaz Mcdermott PTA Date: 22     Time: 1:41 PM

## 2022-10-17 ENCOUNTER — OFFICE VISIT (OUTPATIENT)
Dept: FAMILY MEDICINE CLINIC | Age: 67
End: 2022-10-17
Payer: MEDICARE

## 2022-10-17 VITALS
RESPIRATION RATE: 16 BRPM | OXYGEN SATURATION: 96 % | TEMPERATURE: 98.3 F | DIASTOLIC BLOOD PRESSURE: 74 MMHG | HEART RATE: 68 BPM | SYSTOLIC BLOOD PRESSURE: 134 MMHG | HEIGHT: 59 IN | WEIGHT: 170 LBS | BODY MASS INDEX: 34.27 KG/M2

## 2022-10-17 DIAGNOSIS — R73.01 IMPAIRED FASTING GLUCOSE: Primary | ICD-10-CM

## 2022-10-17 DIAGNOSIS — E05.90 SUBCLINICAL HYPERTHYROIDISM: ICD-10-CM

## 2022-10-17 DIAGNOSIS — E55.9 VITAMIN D DEFICIENCY: ICD-10-CM

## 2022-10-17 DIAGNOSIS — I10 ESSENTIAL HYPERTENSION: ICD-10-CM

## 2022-10-17 DIAGNOSIS — E78.5 HYPERLIPIDEMIA, UNSPECIFIED HYPERLIPIDEMIA TYPE: ICD-10-CM

## 2022-10-17 LAB — HBA1C MFR BLD HPLC: 5.6 %

## 2022-10-17 PROCEDURE — 83036 HEMOGLOBIN GLYCOSYLATED A1C: CPT | Performed by: INTERNAL MEDICINE

## 2022-10-17 PROCEDURE — 3074F SYST BP LT 130 MM HG: CPT | Performed by: INTERNAL MEDICINE

## 2022-10-17 PROCEDURE — G8427 DOCREV CUR MEDS BY ELIG CLIN: HCPCS | Performed by: INTERNAL MEDICINE

## 2022-10-17 PROCEDURE — G8432 DEP SCR NOT DOC, RNG: HCPCS | Performed by: INTERNAL MEDICINE

## 2022-10-17 PROCEDURE — G8417 CALC BMI ABV UP PARAM F/U: HCPCS | Performed by: INTERNAL MEDICINE

## 2022-10-17 PROCEDURE — G9899 SCRN MAM PERF RSLTS DOC: HCPCS | Performed by: INTERNAL MEDICINE

## 2022-10-17 PROCEDURE — G8754 DIAS BP LESS 90: HCPCS | Performed by: INTERNAL MEDICINE

## 2022-10-17 PROCEDURE — 99214 OFFICE O/P EST MOD 30 MIN: CPT | Performed by: INTERNAL MEDICINE

## 2022-10-17 PROCEDURE — 1101F PT FALLS ASSESS-DOCD LE1/YR: CPT | Performed by: INTERNAL MEDICINE

## 2022-10-17 PROCEDURE — 1090F PRES/ABSN URINE INCON ASSESS: CPT | Performed by: INTERNAL MEDICINE

## 2022-10-17 PROCEDURE — 3017F COLORECTAL CA SCREEN DOC REV: CPT | Performed by: INTERNAL MEDICINE

## 2022-10-17 PROCEDURE — G8752 SYS BP LESS 140: HCPCS | Performed by: INTERNAL MEDICINE

## 2022-10-17 PROCEDURE — 1123F ACP DISCUSS/DSCN MKR DOCD: CPT | Performed by: INTERNAL MEDICINE

## 2022-10-17 PROCEDURE — G8536 NO DOC ELDER MAL SCRN: HCPCS | Performed by: INTERNAL MEDICINE

## 2022-10-17 PROCEDURE — 3078F DIAST BP <80 MM HG: CPT | Performed by: INTERNAL MEDICINE

## 2022-10-17 NOTE — PROGRESS NOTES
Assessment/ Plan:   Diagnoses and all orders for this visit:    1. Impaired fasting glucose-continue to watch diet and exercise regularly  -     AMB POC HEMOGLOBIN A1C  Last Point of Care HGB A1C  Hemoglobin A1c (POC)   Date Value Ref Range Status   10/17/2022 5.6 % Final       2. Essential hypertension-continue with Losartan and home BP monitoring  -     CBC WITH AUTOMATED DIFF; Future  -     METABOLIC PANEL, COMPREHENSIVE; Future  -     LIPID PANEL; Future    3. Hyperlipidemia, unspecified hyperlipidemia type-currently off meds because she had some itching with Lipitor and decided not to fill rx of Crestor from Cardio when pharmacist told her that it may give her the same itching. Would like to continue to modify her diet for now; aware of elev ASCVD risk score  The 10-year ASCVD risk score (Deandre BERGER, et al., 2019) is: 11.7%    Values used to calculate the score:      Age: 78 years      Sex: Female      Is Non- : Yes      Diabetic: No      Tobacco smoker: No      Systolic Blood Pressure: 942 mmHg      Is BP treated: Yes      HDL Cholesterol: 67 mg/dL      Total Cholesterol: 213 mg/dL     4. Vitamin D deficiency-on OTC Vit D  -     VITAMIN D, 25 HYDROXY; Future    5. Subclinical hyperthyroidism-will refer to endo if TSH is still low  -     TSH 3RD GENERATION; Future  -     T4, FREE; Future  -     T3, FREE; Future    Lab Results   Component Value Date/Time    TSH 0.851 10/17/2022 12:00 AM        Follow-up and Dispositions    Return in about 3 months (around 1/17/2023) for follow up.                    Chief Complaint   Patient presents with    Follow Up Chronic Condition    Facial Swelling     Pain Right side       Pt is a 78y.o. year old female who presents for follow up of her chronic medical problems    Health Maintenance Due   Topic Date Due    Shingrix Vaccine Age 49> (1 of 2) Never done    Pneumococcal 65+ years (2 - PPSV23 if available, else PCV20) 01/15/2021    COVID-19 Vaccine (4 - Booster for Nadir Rain series) 03/04/2022    Flu Vaccine (1) 08/01/2022     From my nurse:   Patient seen for routine follow up c/o headache and numbness Right side of face to neck x 3 days, pain when attempting to smile. Patient has swelling Right side with slight facial drooping, pain Right shoulder when lifting. Denies slurred speech, no difficulty swallowing, no abdominal discomfort. Symptoms improving today but still with pain, bp increased over the last 3 days. Right sided facial pain and some numbness; had similar episode in 2010 while still in [address] [place] [address] better now    Saw Ortho and PT for left knee pain-better now  Now it is her right knee    Right shoulder pain last week  Took Glucosamine; was helping in renovation of her daughter's house; mowing her yard  Right handed    Wt Readings from Last 3 Encounters:   10/17/22 170 lb (77.1 kg)   04/28/22 172 lb 12.8 oz (78.4 kg)   10/06/21 173 lb 6.4 oz (78.7 kg)        BP Readings from Last 3 Encounters:   10/17/22 134/74   04/28/22 (!) 163/75   10/06/21 114/62    Taking Losartan? Yes  BPs normal at home until the last 3 days  Recheck /86  This AM-132/76    Lab Results   Component Value Date/Time    Cholesterol, total 213 (H) 10/17/2022 12:00 AM    HDL Cholesterol 67 10/17/2022 12:00 AM    LDL, calculated 131 (H) 10/17/2022 12:00 AM    LDL, calculated 84.4 10/06/2021 09:50 AM    VLDL, calculated 15 10/17/2022 12:00 AM    VLDL, calculated 12.6 10/06/2021 09:50 AM    Triglyceride 84 10/17/2022 12:00 AM    CHOL/HDL Ratio 2.4 10/06/2021 09:50 AM    On Lipitor since-    Has seen Cardio 8/2022: changed Crestor to Lipitor bec of itching; but she did not fill new rx bec pharmacy recommended that it may make her feel the same-changed her diet instead  Echo showed no valvular disease  ROS:    Pt denies: Wt loss, Fever/Chills, HA, Visual changes, Fatigue, Chest pain, SOB, COPELAND, Abd pain, N/V/D/C, Blood in stool or urine, Edema.  Pertinent positive as above in HPI. All others were negative    Patient Active Problem List   Diagnosis Code    Bursitis of shoulder, left M75.52    Subcutaneous mass-lt/ neck R22.9    Keratinous cyst-left neck L72.0    Primary osteoarthritis of left hip M16.12    Family history of CVA Z82.3    Hyperlipidemia E78.5    Severe obesity ([facility] [address] E66.01    Essential hypertension I10    Age-related osteoporosis without current pathological fracture M81.0    Impaired fasting glucose R73.01    Subclinical hyperthyroidism E05.90    JOSTIN on CPAP G47.33, Z99.89    RLS (restless legs syndrome) G25.81       Past Medical History:   Diagnosis Date    Arthritis     Diverticulitis        Current Outpatient Medications   Medication Sig Dispense Refill    OTHER Take 2 Tablets by mouth daily. Strontium Boost      atorvastatin (LIPITOR) 10 mg tablet TAKE 1 TABLET BY MOUTH DAILY 90 Tablet 1    losartan (COZAAR) 50 mg tablet TAKE 1 TABLET BY MOUTH DAILY (Patient taking differently: 50 mg. Takes daily) 90 Tablet 1    aspirin 81 mg chewable tablet Take 1 Tab by mouth daily. 30 Tab 0    Calcium-Cholecalciferol, D3, 600 mg-10 mcg (400 unit) chew Take 1 Tab by mouth daily. cyanocobalamin, vitamin B-12, 500 mcg TbDi 1 Tab by SubLINGual route daily. fish oil-dha-epa 1,200-144-216 mg cap Take 2 Caps by mouth two (2) times a day. co-enzyme Q-10 (CO Q-10) 100 mg capsule Take 100 mg by mouth daily. celecoxib (CELEBREX) 100 mg capsule Take 1 Capsule by mouth two (2) times daily as needed (knee pain).  60 Capsule 0       Social History     Tobacco Use   Smoking Status Never   Smokeless Tobacco Never       Allergies   Allergen Reactions    Latex Rash    Naprosyn [Naproxen] Itching    Norvasc [Amlodipine] Rash    Strawberry Itching and Swelling     Per pt report       Patient Labs were reviewed: yes    Patient Past Records were reviewed: yes      Objective:     Vitals:    10/17/22 1317 10/17/22 1342 10/17/22 1400   BP: (!) 150/70 (!) 140/86 134/74   Pulse: 73 68 Resp: 16     Temp: 98.3 °F (36.8 °C)     TempSrc: Temporal     SpO2: 96%     Weight: 170 lb (77.1 kg)     Height: 4' 11\" (1.499 m)       Body mass index is 34.34 kg/m². Exam:   Appearance: alert, well appearing,  oriented to person, place, and time, acyanotic, in no respiratory distress and well hydrated. HEENT:  NC/AT, pink conj, anicteric sclerae  Neck:  No cervical lymphadenopathy, no JVD, no thyromegaly, no carotid bruit  Heart:  RRR without M/R/G  Lungs:  CTAB, no rhonchi, rales, or wheezes with good air exchange   Abdomen:  Non-tender, pos bowel sounds, no hepatosplenomegaly  Ext:  No C/C/E    Skin: no rash  Neuro: no lateralizing signs, CNs II-XII intact            I have discussed the diagnosis with the patient and the intended plan as seen in the above orders. The patient has received an After-Visit Summary and questions were answered concerning future plans. Medication Side Effects and Warnings were discussed with patient: yes    Patient verbalized understanding of above instructions.     Janae Ramos MD  Internal Medicine  [facility]

## 2022-10-17 NOTE — PROGRESS NOTES
Patient seen for routine follow up c/o headache and numbness Right side of face to neck x 3 days, pain when attempting to smile. Patient has swelling Right side with slight facial drooping, pain Right shoulder when lifting. Denies slurred speech, no difficulty swallowing, no abdominal discomfort. Symptoms improving today but still with pain, bp increased over the last 3 days. Health Maintenance Due   Topic Date Due    Shingrix Vaccine Age 49> (1 of 2) Never done    Pneumococcal 65+ years (2 - PPSV23 or PCV20) 01/15/2021    COVID-19 Vaccine (4 - Booster for Pfizer series) 05/07/2022    Flu Vaccine (1) 08/01/2022     1. \"Have you been to the ER, urgent care clinic since your last visit? Hospitalized since your last visit? \" No    2. \"Have you seen or consulted any other health care providers outside of the [address] [place] [address] since your last visit? \" No     3. For patients aged 39-70: Has the patient had a colonoscopy / FIT/ Cologuard? Yes - no Care Gap present      If the patient is female:    4. For patients aged 41-77: Has the patient had a mammogram within the past 2 years? Yes - no Care Gap present      5. For patients aged 21-65: Has the patient had a pap smear?  Yes - no Care Gap present

## 2022-10-18 LAB
25(OH)D3+25(OH)D2 SERPL-MCNC: 67.9 NG/ML (ref 30–100)
ALBUMIN SERPL-MCNC: 4 G/DL (ref 3.8–4.8)
ALBUMIN/GLOB SERPL: 1.3 {RATIO} (ref 1.2–2.2)
ALP SERPL-CCNC: 104 IU/L (ref 44–121)
ALT SERPL-CCNC: 14 IU/L (ref 0–32)
AST SERPL-CCNC: 14 IU/L (ref 0–40)
BASOPHILS # BLD AUTO: 0 X10E3/UL (ref 0–0.2)
BASOPHILS NFR BLD AUTO: 1 %
BILIRUB SERPL-MCNC: 0.3 MG/DL (ref 0–1.2)
BUN SERPL-MCNC: 14 MG/DL (ref 8–27)
BUN/CREAT SERPL: 16 (ref 12–28)
CALCIUM SERPL-MCNC: 8.9 MG/DL (ref 8.7–10.3)
CHLORIDE SERPL-SCNC: 103 MMOL/L (ref 96–106)
CHOLEST SERPL-MCNC: 213 MG/DL (ref 100–199)
CO2 SERPL-SCNC: 22 MMOL/L (ref 20–29)
CREAT SERPL-MCNC: 0.89 MG/DL (ref 0.57–1)
EGFR: 71 ML/MIN/1.73
EOSINOPHIL # BLD AUTO: 0.2 X10E3/UL (ref 0–0.4)
EOSINOPHIL NFR BLD AUTO: 3 %
ERYTHROCYTE [DISTWIDTH] IN BLOOD BY AUTOMATED COUNT: 15.4 % (ref 11.7–15.4)
GLOBULIN SER CALC-MCNC: 3.2 G/DL (ref 1.5–4.5)
GLUCOSE SERPL-MCNC: 88 MG/DL (ref 70–99)
HCT VFR BLD AUTO: 43.8 % (ref 34–46.6)
HDLC SERPL-MCNC: 67 MG/DL
HGB BLD-MCNC: 14 G/DL (ref 11.1–15.9)
IMM GRANULOCYTES # BLD AUTO: 0 X10E3/UL (ref 0–0.1)
IMM GRANULOCYTES NFR BLD AUTO: 0 %
IMP & REVIEW OF LAB RESULTS: NORMAL
LDLC SERPL CALC-MCNC: 131 MG/DL (ref 0–99)
LYMPHOCYTES # BLD AUTO: 2 X10E3/UL (ref 0.7–3.1)
LYMPHOCYTES NFR BLD AUTO: 47 %
MCH RBC QN AUTO: 25.4 PG (ref 26.6–33)
MCHC RBC AUTO-ENTMCNC: 32 G/DL (ref 31.5–35.7)
MCV RBC AUTO: 79 FL (ref 79–97)
MONOCYTES # BLD AUTO: 0.4 X10E3/UL (ref 0.1–0.9)
MONOCYTES NFR BLD AUTO: 8 %
NEUTROPHILS # BLD AUTO: 1.8 X10E3/UL (ref 1.4–7)
NEUTROPHILS NFR BLD AUTO: 41 %
PLATELET # BLD AUTO: 189 X10E3/UL (ref 150–450)
POTASSIUM SERPL-SCNC: 4 MMOL/L (ref 3.5–5.2)
PROT SERPL-MCNC: 7.2 G/DL (ref 6–8.5)
RBC # BLD AUTO: 5.52 X10E6/UL (ref 3.77–5.28)
SODIUM SERPL-SCNC: 142 MMOL/L (ref 134–144)
T3FREE SERPL-MCNC: 2.5 PG/ML (ref 2–4.4)
T4 FREE SERPL-MCNC: 0.93 NG/DL (ref 0.82–1.77)
TRIGL SERPL-MCNC: 84 MG/DL (ref 0–149)
TSH SERPL DL<=0.005 MIU/L-ACNC: 0.85 UIU/ML (ref 0.45–4.5)
VLDLC SERPL CALC-MCNC: 15 MG/DL (ref 5–40)
WBC # BLD AUTO: 4.4 X10E3/UL (ref 3.4–10.8)

## 2022-11-10 DIAGNOSIS — M25.511 ACUTE PAIN OF RIGHT SHOULDER: Primary | ICD-10-CM

## 2022-12-08 DIAGNOSIS — I10 ESSENTIAL HYPERTENSION: ICD-10-CM

## 2022-12-08 RX ORDER — LOSARTAN POTASSIUM 50 MG/1
50 TABLET ORAL DAILY
Qty: 90 TABLET | Refills: 1 | Status: SHIPPED | OUTPATIENT
Start: 2022-12-08

## 2022-12-13 ENCOUNTER — TELEPHONE (OUTPATIENT)
Dept: FAMILY MEDICINE CLINIC | Age: 67
End: 2022-12-13

## 2022-12-13 NOTE — TELEPHONE ENCOUNTER
Below message is incorrect. Patient was at the emergency room on 12-12-22 at [facility]. Patient was not admitted to hospital as she left without being seen after triage per the emergency room note in EMR.

## 2022-12-13 NOTE — TELEPHONE ENCOUNTER
----- Message from Nicole Keita sent at 12/13/2022  2:13 PM EST -----  Subject: Hospital Follow Up    QUESTIONS  What hospital was the Patient Discharged from? [address] [place] [address]  Date of Discharge? 2022-12-13  Discharge Location? Home  Reason for hospitalization as patient stated? Right sided head pain and   numbness. What question does the patient have, if applicable?   ---------------------------------------------------------------------------  --------------  CALL BACK INFO  What is the best way for the office to contact you? OK to leave message on   voicemail  Preferred Call Back Phone Number? [phone]  ---------------------------------------------------------------------------  --------------  SCRIPT ANSWERS  Relationship to Patient?  Self

## 2022-12-21 ENCOUNTER — OFFICE VISIT (OUTPATIENT)
Dept: FAMILY MEDICINE CLINIC | Age: 67
End: 2022-12-21
Payer: MEDICARE

## 2022-12-21 VITALS
SYSTOLIC BLOOD PRESSURE: 139 MMHG | TEMPERATURE: 98.2 F | WEIGHT: 170 LBS | RESPIRATION RATE: 16 BRPM | OXYGEN SATURATION: 98 % | HEART RATE: 92 BPM | DIASTOLIC BLOOD PRESSURE: 71 MMHG | BODY MASS INDEX: 34.27 KG/M2 | HEIGHT: 59 IN

## 2022-12-21 DIAGNOSIS — G47.33 OSA ON CPAP: ICD-10-CM

## 2022-12-21 DIAGNOSIS — Z99.89 OSA ON CPAP: ICD-10-CM

## 2022-12-21 DIAGNOSIS — E78.5 HYPERLIPIDEMIA, UNSPECIFIED HYPERLIPIDEMIA TYPE: ICD-10-CM

## 2022-12-21 DIAGNOSIS — G25.81 RLS (RESTLESS LEGS SYNDROME): ICD-10-CM

## 2022-12-21 DIAGNOSIS — I10 ESSENTIAL HYPERTENSION: Primary | ICD-10-CM

## 2022-12-21 DIAGNOSIS — G45.9 TIA (TRANSIENT ISCHEMIC ATTACK): ICD-10-CM

## 2022-12-21 RX ORDER — LOSARTAN POTASSIUM 50 MG/1
50 TABLET ORAL DAILY
Qty: 90 TABLET | Refills: 1 | Status: SHIPPED | OUTPATIENT
Start: 2022-12-21

## 2022-12-21 RX ORDER — ROSUVASTATIN CALCIUM 10 MG/1
20 TABLET, COATED ORAL
COMMUNITY
End: 2022-12-21 | Stop reason: DRUGHIGH

## 2022-12-21 RX ORDER — GARLIC 1000 MG
2 CAPSULE ORAL DAILY
COMMUNITY

## 2022-12-21 RX ORDER — ROSUVASTATIN CALCIUM 20 MG/1
20 TABLET, COATED ORAL
Qty: 90 TABLET | Refills: 1 | Status: SHIPPED | OUTPATIENT
Start: 2022-12-21

## 2022-12-21 NOTE — PROGRESS NOTES
1. \"Have you been to the ER, urgent care clinic since your last visit? Hospitalized since your last visit? \" [facility] 12-12-22 to 12-13-22 for headache DX: CVA    2. \"Have you seen or consulted any other health care providers outside of the [address] [place] [address] since your last visit? \" {Yes when where and reason for visit:20441}     3. For patients aged 39-70: Has the patient had a colonoscopy / FIT/ Cologuard? Yes - no Care Gap present      If the patient is female:    4. For patients aged 41-77: Has the patient had a mammogram within the past 2 years? Yes - no Care Gap present      5. For patients aged 21-65: Has the patient had a pap smear?  NA - based on age or sex

## 2022-12-21 NOTE — PROGRESS NOTES
Assessment/ Plan:   Diagnoses and all orders for this visit:    1. Essential hypertension-continue with home monitoring with goal BP is less than 130/80  -     losartan (COZAAR) 50 mg tablet; Take 1 Tablet by mouth daily. 2. Hyperlipidemia, unspecified hyperlipidemia type-goal LDL is less than 70  -     rosuvastatin (CRESTOR) 20 mg tablet; Take 1 Tablet by mouth nightly. 3. TIA (transient ischemic attack)-continue with daily ASA    4. JOSTIN on CPAP-advised compliance    5. RLS (restless legs syndrome)-on iron pills daily      Follow-up and Dispositions    Return for previous appt.                  Chief Complaint   Patient presents with    [address] 12-12-22 to 12-13-22 for headache DX: CVA       Pt is a 75y.o. year old female who presents for follow up of her chronic medical problems/recent hospitalization    Health Maintenance Due   Topic Date Due    Shingles Vaccine (1 of 2) Never done    Pneumococcal 65+ years (2 - PPSV23 if available, else PCV20) 01/15/2021    COVID-19 Vaccine (4 - Booster for Pfizer series) 03/04/2022    Flu Vaccine (1)-declined 08/01/2022      Had right sided weakness-went to ER; could barely open the right eye; still feels numbness on the right side of the face like Novocaine from dentist  Discharged on higher dose of  from 81 mg buit the higher dose makes her bleed so just taking the 81 mg (hemorrhoidal bleed)  Inc Crestor to 20 mg  Losartan 50    Rash with Lipitor    BP Readings from Last 3 Encounters:   12/21/22 139/71   12/12/22 132/82   10/17/22 134/74    Home BPs-has some readings in the 140's    Lab Results   Component Value Date/Time    Cholesterol, total 213 (H) 10/17/2022 12:00 AM    HDL Cholesterol 67 10/17/2022 12:00 AM    LDL, calculated 131 (H) 10/17/2022 12:00 AM    LDL, calculated 84.4 10/06/2021 09:50 AM    VLDL, calculated 15 10/17/2022 12:00 AM    VLDL, calculated 12.6 10/06/2021 09:50 AM    Triglyceride 84 10/17/2022 12:00 AM    CHOL/HDL Ratio 2.4 10/06/2021 09:50 AM      Work up: Echo:  CONCLUSIONS     * No evidence of shunt at atrial level by 2D, color flow and negative bubble   study. * Left ventricular systolic function is normal with an ejection fraction of   65 % by visual estimation. * Left ventricular chamber size is normal.     * Right ventricular systolic function is normal.     * Right ventricular chamber dimension is normal.     Comparison     * Compared to prior study from 8/17/2022. * Ejection fraction 67%. * No pulmonary pressure given. MRI showed no acute findings    CTA:    1. No intracranial large vessel occlusion. No significant intracranial stenosis. No cerebral aneurysm. 2. Patent cervical carotid and vertebral arteries. No hemodynamically significant stenosis. 3. There are small linear filling defects within the wall of the left and right proximal ICA. Nonhemodynamically significant, these may be small areas of atherosclerotic plaque, small web, or less likely chronic dissection. Now with CPAP  Was given iron pills for RLS    Lab Results   Component Value Date/Time    Ferritin 41.6 12/13/2021 10:51 AM        ROS:    Pt denies: Wt loss, Fever/Chills, HA, Visual changes, Fatigue, Chest pain, SOB, COPELAND, Abd pain, N/V/D/C, Blood in stool or urine, Edema. Pertinent positive as above in HPI.  All others were negative    Patient Active Problem List   Diagnosis Code    Bursitis of shoulder, left M75.52    Subcutaneous mass-lt/ neck R22.9    Keratinous cyst-left neck L72.0    Primary osteoarthritis of left hip M16.12    Family history of CVA Z82.3    Hyperlipidemia E78.5    Severe obesity ([address] E66.01    Essential hypertension I10    Age-related osteoporosis without current pathological fracture M81.0    Impaired fasting glucose R73.01    Subclinical hyperthyroidism E05.90    JOSTIN on CPAP G47.33, Z99.89    RLS (restless legs syndrome) G25.81    TIA (transient ischemic attack) G45.9       Past Medical History:   Diagnosis Date    Arthritis     Diverticulitis        Current Outpatient Medications   Medication Sig Dispense Refill    garlic 1,869 mg cap Take 2 Tablets by mouth daily. OTHER Instaflex 1 tablet by mouth daily      rosuvastatin (CRESTOR) 20 mg tablet Take 1 Tablet by mouth nightly. 90 Tablet 1    losartan (COZAAR) 50 mg tablet Take 1 Tablet by mouth daily. 90 Tablet 1    OTHER Take 2 Tablets by mouth daily. Strontium Boost      aspirin 81 mg chewable tablet Take 1 Tab by mouth daily. 30 Tab 0    Calcium-Cholecalciferol, D3, 600 mg-10 mcg (400 unit) chew Take 1 Tab by mouth daily. cyanocobalamin, vitamin B-12, 500 mcg TbDi 1 Tab by SubLINGual route daily. fish oil-dha-epa 1,200-144-216 mg cap Take 2 Caps by mouth two (2) times a day. co-enzyme Q-10 (CO Q-10) 100 mg capsule Take 100 mg by mouth daily. Social History     Tobacco Use   Smoking Status Never   Smokeless Tobacco Never       Allergies   Allergen Reactions    Latex Rash    Lipitor [Atorvastatin] Itching    Naprosyn [Naproxen] Itching    Norvasc [Amlodipine] Rash    Strawberry Itching and Swelling     Per pt report       Patient Labs were reviewed: yes    Patient Past Records were reviewed: yes      Objective:     Vitals:    12/21/22 1236   BP: 139/71   Pulse: 92   Resp: 16   Temp: 98.2 °F (36.8 °C)   TempSrc: Temporal   SpO2: 98%   Weight: 170 lb (77.1 kg)   Height: 4' 11\" (1.499 m)     Body mass index is 34.34 kg/m². Exam:   Appearance: alert, well appearing,  oriented to person, place, and time, acyanotic, in no respiratory distress and well hydrated.   HEENT:  NC/AT, pink conj, anicteric sclerae  Neck:  No cervical lymphadenopathy, no JVD, no thyromegaly, no carotid bruit  Heart:  RRR without M/R/G  Lungs:  CTAB, no rhonchi, rales, or wheezes with good air exchange   Abdomen:  Non-tender, pos bowel sounds, no hepatosplenomegaly  Ext:  No C/C/E    Skin: no rash  Neuro: no lateralizing signs, CNs II-XII intact            I have discussed the diagnosis with the patient and the intended plan as seen in the above orders. The patient has received an After-Visit Summary and questions were answered concerning future plans. Medication Side Effects and Warnings were discussed with patient: yes    Patient verbalized understanding of above instructions.     Luis M Carney MD  Internal Medicine  [facility]

## 2023-01-03 DIAGNOSIS — G25.81 RLS (RESTLESS LEGS SYNDROME): Primary | ICD-10-CM

## 2023-01-03 RX ORDER — FERROUS SULFATE 325(65) MG
325 TABLET, DELAYED RELEASE (ENTERIC COATED) ORAL DAILY
Qty: 90 TABLET | Refills: 0 | Status: SHIPPED | OUTPATIENT
Start: 2023-01-03

## 2023-01-17 ENCOUNTER — OFFICE VISIT (OUTPATIENT)
Dept: FAMILY MEDICINE CLINIC | Age: 68
End: 2023-01-17
Payer: MEDICARE

## 2023-01-17 VITALS
WEIGHT: 170 LBS | RESPIRATION RATE: 14 BRPM | BODY MASS INDEX: 34.27 KG/M2 | OXYGEN SATURATION: 98 % | TEMPERATURE: 98.2 F | SYSTOLIC BLOOD PRESSURE: 138 MMHG | HEIGHT: 59 IN | HEART RATE: 84 BPM | DIASTOLIC BLOOD PRESSURE: 79 MMHG

## 2023-01-17 DIAGNOSIS — E55.9 VITAMIN D DEFICIENCY: ICD-10-CM

## 2023-01-17 DIAGNOSIS — R79.0 LOW IRON STORES: ICD-10-CM

## 2023-01-17 DIAGNOSIS — E66.01 SEVERE OBESITY (HCC): ICD-10-CM

## 2023-01-17 DIAGNOSIS — Z99.89 OSA ON CPAP: ICD-10-CM

## 2023-01-17 DIAGNOSIS — M85.80 OSTEOPENIA, UNSPECIFIED LOCATION: ICD-10-CM

## 2023-01-17 DIAGNOSIS — R73.01 IMPAIRED FASTING GLUCOSE: ICD-10-CM

## 2023-01-17 DIAGNOSIS — G47.33 OSA ON CPAP: ICD-10-CM

## 2023-01-17 DIAGNOSIS — E78.5 HYPERLIPIDEMIA, UNSPECIFIED HYPERLIPIDEMIA TYPE: ICD-10-CM

## 2023-01-17 DIAGNOSIS — G25.81 RLS (RESTLESS LEGS SYNDROME): ICD-10-CM

## 2023-01-17 DIAGNOSIS — I10 ESSENTIAL HYPERTENSION: Primary | ICD-10-CM

## 2023-01-17 PROBLEM — M81.0 AGE-RELATED OSTEOPOROSIS WITHOUT CURRENT PATHOLOGICAL FRACTURE: Status: RESOLVED | Noted: 2020-08-18 | Resolved: 2023-01-17

## 2023-01-17 PROBLEM — E05.90 SUBCLINICAL HYPERTHYROIDISM: Status: RESOLVED | Noted: 2022-01-10 | Resolved: 2023-01-17

## 2023-01-17 PROCEDURE — G9899 SCRN MAM PERF RSLTS DOC: HCPCS | Performed by: INTERNAL MEDICINE

## 2023-01-17 PROCEDURE — 1123F ACP DISCUSS/DSCN MKR DOCD: CPT | Performed by: INTERNAL MEDICINE

## 2023-01-17 PROCEDURE — 3017F COLORECTAL CA SCREEN DOC REV: CPT | Performed by: INTERNAL MEDICINE

## 2023-01-17 PROCEDURE — G8417 CALC BMI ABV UP PARAM F/U: HCPCS | Performed by: INTERNAL MEDICINE

## 2023-01-17 PROCEDURE — G8427 DOCREV CUR MEDS BY ELIG CLIN: HCPCS | Performed by: INTERNAL MEDICINE

## 2023-01-17 PROCEDURE — 99214 OFFICE O/P EST MOD 30 MIN: CPT | Performed by: INTERNAL MEDICINE

## 2023-01-17 PROCEDURE — 1101F PT FALLS ASSESS-DOCD LE1/YR: CPT | Performed by: INTERNAL MEDICINE

## 2023-01-17 PROCEDURE — G8432 DEP SCR NOT DOC, RNG: HCPCS | Performed by: INTERNAL MEDICINE

## 2023-01-17 PROCEDURE — G8536 NO DOC ELDER MAL SCRN: HCPCS | Performed by: INTERNAL MEDICINE

## 2023-01-17 PROCEDURE — 1090F PRES/ABSN URINE INCON ASSESS: CPT | Performed by: INTERNAL MEDICINE

## 2023-01-17 PROCEDURE — 3075F SYST BP GE 130 - 139MM HG: CPT | Performed by: INTERNAL MEDICINE

## 2023-01-17 PROCEDURE — 3078F DIAST BP <80 MM HG: CPT | Performed by: INTERNAL MEDICINE

## 2023-01-17 NOTE — PROGRESS NOTES
1. \"Have you been to the ER, urgent care clinic since your last visit? Hospitalized since your last visit? \" No    2. \"Have you seen or consulted any other health care providers outside of the [address] [place] [address] since your last visit? \" No     3. For patients aged 39-70: Has the patient had a colonoscopy / FIT/ Cologuard? Yes - no Care Gap present      If the patient is female:    4. For patients aged 41-77: Has the patient had a mammogram within the past 2 years? Yes - no Care Gap present      5. For patients aged 21-65: Has the patient had a pap smear?  NA - based on age or sex

## 2023-01-17 NOTE — PROGRESS NOTES
Assessment/ Plan:   Diagnoses and all orders for this visit:    1. Essential hypertension-Advised to inc Losartan to 100 mg id BP at home is elev over 140/90 on at least 3 separate occasions; continue with low sodium diet  -     CBC WITH AUTOMATED DIFF; Future  -     METABOLIC PANEL, COMPREHENSIVE; Future    2. Hyperlipidemia, unspecified hyperlipidemia type-check lipid panel as she is now on Crestor with goal LDL of less than 70  -     LIPID PANEL; Future    3. Impaired fasting glucose-continue with efforts at weight loss; encouraged to add regular exercise  -     HEMOGLOBIN A1C WITH EAG; Future    4. JOSTIN on CPAP-compliant    5. RLS (restless legs syndrome)-on daily iron pills  -     FERRITIN; Future    6. Vitamin D deficiency-on rx  -     VITAMIN D, 25 HYDROXY; Future    7. Low iron stores  -     FERRITIN; Future    8. Osteopenia, unspecified location-continue with daily Ca+D, walk for exercise    9. Severe obesity (HCC)-discussed limiting álvaro to 2120-9852/day and exercising at least 150 min a week   -     REFERRAL TO WEIGHT LOSS      Follow-up and Dispositions    Return in about 3 months (around 4/17/2023) for follow up.                      Chief Complaint   Patient presents with    Follow Up Chronic Condition     3 month       Pt is a 75y.o. year old female who presents for follow up of her chronic medical problems    Health Maintenance Due   Topic Date Due    Shingles Vaccine (1 of 2) Never done    Pneumococcal 65+ years (2 - PPSV23 if available, else PCV20)-first one made her sick 01/15/2021    COVID-19 Vaccine (4 - Booster for Pfizer series) 03/04/2022    Flu Vaccine (1)-had the flu already 08/01/2022      Wt Readings from Last 3 Encounters:   01/17/23 170 lb (77.1 kg)   12/21/22 170 lb (77.1 kg)   10/17/22 170 lb (77.1 kg)    Would like to be seen at [facility] weight [facility]    BP Readings from Last 3 Encounters:   01/17/23 (!) 143/77   12/21/22 139/71   12/12/22 132/82    Repeat BP better  Home BPs-better 111-130  Took Losartan      Lab Results   Component Value Date/Time    Cholesterol, total 213 (H) 10/17/2022 12:00 AM    HDL Cholesterol 67 10/17/2022 12:00 AM    LDL, calculated 131 (H) 10/17/2022 12:00 AM    LDL, calculated 84.4 10/06/2021 09:50 AM    VLDL, calculated 15 10/17/2022 12:00 AM    VLDL, calculated 12.6 10/06/2021 09:50 AM    Triglyceride 84 10/17/2022 12:00 AM    CHOL/HDL Ratio 2.4 10/06/2021 09:50 AM    On Crestor 20  Lipitor made her itch  Fasting? yes    Lab Results   Component Value Date/Time    Hemoglobin A1c 6.3 (H) 04/28/2022 12:52 PM    Hemoglobin A1c (POC) 5.6 10/17/2022 01:30 PM    Family hx of DM-yes  Would prefer no shots  Hx of low sugars    Did not see ortho bec the right shoulder pain got better with OTC INstaflex      Compliant with CPAP  Iron pills for RLS    Lab Results   Component Value Date/Time    TSH 0.851 10/17/2022 12:00 AM      ROS:    Pt denies: Wt loss, Fever/Chills, HA, Visual changes, Fatigue, Chest pain, SOB, COPELAND, Abd pain, N/V/D/C, Blood in stool or urine, Edema. Pertinent positive as above in HPI. All others were negative    Patient Active Problem List   Diagnosis Code    Primary osteoarthritis of left hip M16.12    Family history of CVA Z82.3    Hyperlipidemia E78.5    Severe obesity ([facility] [address] E66.01    Essential hypertension I10    Impaired fasting glucose R73.01    JOSTIN on CPAP G47.33, Z99.89    RLS (restless legs syndrome) G25.81    TIA (transient ischemic attack) G45.9    Osteopenia M85.80       Past Medical History:   Diagnosis Date    Age-related osteoporosis without current pathological fracture 8/18/2020    Arthritis     Bursitis of shoulder, left 5/15/2014    Doing PT    Diverticulitis     Subcutaneous mass-lt/ neck 9/17/2015       Current Outpatient Medications   Medication Sig Dispense Refill    ferrous sulfate (IRON) 325 mg (65 mg iron) EC tablet Take 1 Tablet by mouth daily. 90 Tablet 0    garlic 8,382 mg cap Take 2 Tablets by mouth daily.       OTHER Instaflex 1 tablet by mouth daily      rosuvastatin (CRESTOR) 20 mg tablet Take 1 Tablet by mouth nightly. 90 Tablet 1    losartan (COZAAR) 50 mg tablet Take 1 Tablet by mouth daily. 90 Tablet 1    OTHER Take 2 Tablets by mouth daily. Strontium Boost      aspirin 81 mg chewable tablet Take 1 Tab by mouth daily. 30 Tab 0    Calcium-Cholecalciferol, D3, 600 mg-10 mcg (400 unit) chew Take 1 Tab by mouth daily. fish oil-dha-epa 1,200-144-216 mg cap Take 2 Caps by mouth two (2) times a day. co-enzyme Q-10 (CO Q-10) 100 mg capsule Take 100 mg by mouth daily. Social History     Tobacco Use   Smoking Status Never   Smokeless Tobacco Never       Allergies   Allergen Reactions    Latex Rash    Lipitor [Atorvastatin] Itching    Naprosyn [Naproxen] Itching    Norvasc [Amlodipine] Rash    Strawberry Itching and Swelling     Per pt report       Patient Labs were reviewed: yes    Patient Past Records were reviewed: yes      Objective:     Vitals:    01/17/23 0847 01/17/23 0945   BP: (!) 143/77 138/79   Pulse: 84    Resp: 14    Temp: 98.2 °F (36.8 °C)    TempSrc: Temporal    SpO2: 98%    Weight: 170 lb (77.1 kg)    Height: 4' 11\" (1.499 m)      Body mass index is 34.34 kg/m². Exam:   Appearance: alert, well appearing,  oriented to person, place, and time, acyanotic, in no respiratory distress and well hydrated. HEENT:  NC/AT, pink conj, anicteric sclerae  Neck:  No cervical lymphadenopathy, no JVD, no thyromegaly, no carotid bruit  Heart:  RRR without M/R/G  Lungs:  CTAB, no rhonchi, rales, or wheezes with good air exchange   Abdomen:  Non-tender, pos bowel sounds, no hepatosplenomegaly  Ext:  No C/C/E    Skin: no rash  Neuro: no lateralizing signs, CNs II-XII intact            I have discussed the diagnosis with the patient and the intended plan as seen in the above orders. The patient has received an After-Visit Summary and questions were answered concerning future plans.      Medication Side Effects and Warnings were discussed with patient: yes    Patient verbalized understanding of above instructions.     Rachel Osullivan MD  Internal Medicine  [facility]

## 2023-01-18 LAB
25(OH)D3+25(OH)D2 SERPL-MCNC: 57.7 NG/ML (ref 30–100)
ALBUMIN SERPL-MCNC: 4.3 G/DL (ref 3.8–4.8)
ALBUMIN/GLOB SERPL: 1.4 {RATIO} (ref 1.2–2.2)
ALP SERPL-CCNC: 98 IU/L (ref 44–121)
ALT SERPL-CCNC: 17 IU/L (ref 0–32)
AST SERPL-CCNC: 17 IU/L (ref 0–40)
BASOPHILS # BLD AUTO: 0 X10E3/UL (ref 0–0.2)
BASOPHILS NFR BLD AUTO: 1 %
BILIRUB SERPL-MCNC: 0.3 MG/DL (ref 0–1.2)
BUN SERPL-MCNC: 11 MG/DL (ref 8–27)
BUN/CREAT SERPL: 16 (ref 12–28)
CALCIUM SERPL-MCNC: 9.2 MG/DL (ref 8.7–10.3)
CHLORIDE SERPL-SCNC: 102 MMOL/L (ref 96–106)
CHOLEST SERPL-MCNC: 158 MG/DL (ref 100–199)
CO2 SERPL-SCNC: 21 MMOL/L (ref 20–29)
CREAT SERPL-MCNC: 0.68 MG/DL (ref 0.57–1)
EGFR: 95 ML/MIN/1.73
EOSINOPHIL # BLD AUTO: 0.1 X10E3/UL (ref 0–0.4)
EOSINOPHIL NFR BLD AUTO: 3 %
ERYTHROCYTE [DISTWIDTH] IN BLOOD BY AUTOMATED COUNT: 15.1 % (ref 11.7–15.4)
EST. AVERAGE GLUCOSE BLD GHB EST-MCNC: 117 MG/DL
FERRITIN SERPL-MCNC: 103 NG/ML (ref 15–150)
GLOBULIN SER CALC-MCNC: 3 G/DL (ref 1.5–4.5)
GLUCOSE SERPL-MCNC: 89 MG/DL (ref 70–99)
HBA1C MFR BLD: 5.7 % (ref 4.8–5.6)
HCT VFR BLD AUTO: 43.3 % (ref 34–46.6)
HDLC SERPL-MCNC: 70 MG/DL
HGB BLD-MCNC: 13.7 G/DL (ref 11.1–15.9)
IMM GRANULOCYTES # BLD AUTO: 0 X10E3/UL (ref 0–0.1)
IMM GRANULOCYTES NFR BLD AUTO: 0 %
IMP & REVIEW OF LAB RESULTS: NORMAL
LDLC SERPL CALC-MCNC: 77 MG/DL (ref 0–99)
LYMPHOCYTES # BLD AUTO: 1.9 X10E3/UL (ref 0.7–3.1)
LYMPHOCYTES NFR BLD AUTO: 44 %
MCH RBC QN AUTO: 25.2 PG (ref 26.6–33)
MCHC RBC AUTO-ENTMCNC: 31.6 G/DL (ref 31.5–35.7)
MCV RBC AUTO: 80 FL (ref 79–97)
MONOCYTES # BLD AUTO: 0.3 X10E3/UL (ref 0.1–0.9)
MONOCYTES NFR BLD AUTO: 8 %
NEUTROPHILS # BLD AUTO: 2 X10E3/UL (ref 1.4–7)
NEUTROPHILS NFR BLD AUTO: 44 %
PLATELET # BLD AUTO: 165 X10E3/UL (ref 150–450)
POTASSIUM SERPL-SCNC: 4.3 MMOL/L (ref 3.5–5.2)
PROT SERPL-MCNC: 7.3 G/DL (ref 6–8.5)
RBC # BLD AUTO: 5.43 X10E6/UL (ref 3.77–5.28)
SODIUM SERPL-SCNC: 139 MMOL/L (ref 134–144)
TRIGL SERPL-MCNC: 55 MG/DL (ref 0–149)
VLDLC SERPL CALC-MCNC: 11 MG/DL (ref 5–40)
WBC # BLD AUTO: 4.4 X10E3/UL (ref 3.4–10.8)

## 2023-02-02 DIAGNOSIS — I10 ESSENTIAL HYPERTENSION: ICD-10-CM

## 2023-02-02 RX ORDER — LOSARTAN POTASSIUM 50 MG/1
50 TABLET ORAL DAILY
Qty: 90 TABLET | Refills: 1 | Status: SHIPPED | OUTPATIENT
Start: 2023-02-02

## 2023-02-16 DIAGNOSIS — M25.519 ACUTE SHOULDER PAIN, UNSPECIFIED LATERALITY: Primary | ICD-10-CM

## 2023-04-20 ENCOUNTER — OFFICE VISIT (OUTPATIENT)
Facility: CLINIC | Age: 68
End: 2023-04-20

## 2023-04-20 VITALS
TEMPERATURE: 98.3 F | WEIGHT: 173 LBS | SYSTOLIC BLOOD PRESSURE: 131 MMHG | RESPIRATION RATE: 14 BRPM | HEART RATE: 65 BPM | DIASTOLIC BLOOD PRESSURE: 73 MMHG | HEIGHT: 59 IN | BODY MASS INDEX: 34.88 KG/M2 | OXYGEN SATURATION: 98 %

## 2023-04-20 DIAGNOSIS — G89.29 CHRONIC RIGHT SHOULDER PAIN: ICD-10-CM

## 2023-04-20 DIAGNOSIS — E78.5 HYPERLIPIDEMIA, UNSPECIFIED HYPERLIPIDEMIA TYPE: ICD-10-CM

## 2023-04-20 DIAGNOSIS — Z12.31 ENCOUNTER FOR SCREENING MAMMOGRAM FOR MALIGNANT NEOPLASM OF BREAST: ICD-10-CM

## 2023-04-20 DIAGNOSIS — R63.5 WEIGHT GAIN: ICD-10-CM

## 2023-04-20 DIAGNOSIS — F51.04 CHRONIC INSOMNIA: ICD-10-CM

## 2023-04-20 DIAGNOSIS — Z00.00 MEDICARE ANNUAL WELLNESS VISIT, SUBSEQUENT: Primary | ICD-10-CM

## 2023-04-20 DIAGNOSIS — I10 ESSENTIAL (PRIMARY) HYPERTENSION: ICD-10-CM

## 2023-04-20 DIAGNOSIS — R73.01 IMPAIRED FASTING GLUCOSE: ICD-10-CM

## 2023-04-20 DIAGNOSIS — M25.511 CHRONIC RIGHT SHOULDER PAIN: ICD-10-CM

## 2023-04-20 DIAGNOSIS — E66.01 SEVERE OBESITY (HCC): ICD-10-CM

## 2023-04-20 LAB — HBA1C MFR BLD: 5.4 %

## 2023-04-20 RX ORDER — TRAZODONE HYDROCHLORIDE 50 MG/1
50 TABLET ORAL NIGHTLY PRN
Qty: 90 TABLET | Refills: 0 | Status: SHIPPED | OUTPATIENT
Start: 2023-04-20

## 2023-04-20 SDOH — ECONOMIC STABILITY: INCOME INSECURITY: HOW HARD IS IT FOR YOU TO PAY FOR THE VERY BASICS LIKE FOOD, HOUSING, MEDICAL CARE, AND HEATING?: NOT HARD AT ALL

## 2023-04-20 SDOH — ECONOMIC STABILITY: HOUSING INSECURITY
IN THE LAST 12 MONTHS, WAS THERE A TIME WHEN YOU DID NOT HAVE A STEADY PLACE TO SLEEP OR SLEPT IN A SHELTER (INCLUDING NOW)?: NO

## 2023-04-20 SDOH — ECONOMIC STABILITY: FOOD INSECURITY: WITHIN THE PAST 12 MONTHS, YOU WORRIED THAT YOUR FOOD WOULD RUN OUT BEFORE YOU GOT MONEY TO BUY MORE.: NEVER TRUE

## 2023-04-20 SDOH — ECONOMIC STABILITY: FOOD INSECURITY: WITHIN THE PAST 12 MONTHS, THE FOOD YOU BOUGHT JUST DIDN'T LAST AND YOU DIDN'T HAVE MONEY TO GET MORE.: NEVER TRUE

## 2023-04-20 ASSESSMENT — PATIENT HEALTH QUESTIONNAIRE - PHQ9
2. FEELING DOWN, DEPRESSED OR HOPELESS: 0
SUM OF ALL RESPONSES TO PHQ QUESTIONS 1-9: 0
1. LITTLE INTEREST OR PLEASURE IN DOING THINGS: 0
SUM OF ALL RESPONSES TO PHQ9 QUESTIONS 1 & 2: 0
SUM OF ALL RESPONSES TO PHQ QUESTIONS 1-9: 0

## 2023-04-20 ASSESSMENT — ANXIETY QUESTIONNAIRES
2. NOT BEING ABLE TO STOP OR CONTROL WORRYING: 0
GAD7 TOTAL SCORE: 0
1. FEELING NERVOUS, ANXIOUS, OR ON EDGE: 0
3. WORRYING TOO MUCH ABOUT DIFFERENT THINGS: 0
6. BECOMING EASILY ANNOYED OR IRRITABLE: 0
7. FEELING AFRAID AS IF SOMETHING AWFUL MIGHT HAPPEN: 0
5. BEING SO RESTLESS THAT IT IS HARD TO SIT STILL: 0
4. TROUBLE RELAXING: 0

## 2023-04-20 ASSESSMENT — LIFESTYLE VARIABLES
HOW MANY STANDARD DRINKS CONTAINING ALCOHOL DO YOU HAVE ON A TYPICAL DAY: PATIENT DOES NOT DRINK
HOW OFTEN DO YOU HAVE A DRINK CONTAINING ALCOHOL: NEVER

## 2023-04-21 LAB
ALBUMIN SERPL-MCNC: 4.2 G/DL (ref 3.8–4.8)
ALBUMIN/GLOB SERPL: 1.5 {RATIO} (ref 1.2–2.2)
ALP SERPL-CCNC: 85 IU/L (ref 44–121)
ALT SERPL-CCNC: 29 IU/L (ref 0–32)
AST SERPL-CCNC: 21 IU/L (ref 0–40)
BASOPHILS # BLD AUTO: 0 X10E3/UL (ref 0–0.2)
BASOPHILS NFR BLD AUTO: 1 %
BILIRUB SERPL-MCNC: 0.3 MG/DL (ref 0–1.2)
BUN SERPL-MCNC: 11 MG/DL (ref 8–27)
BUN/CREAT SERPL: 14 (ref 12–28)
CALCIUM SERPL-MCNC: 9.2 MG/DL (ref 8.7–10.3)
CHLORIDE SERPL-SCNC: 103 MMOL/L (ref 96–106)
CHOLEST SERPL-MCNC: 220 MG/DL (ref 100–199)
CO2 SERPL-SCNC: 19 MMOL/L (ref 20–29)
CREAT SERPL-MCNC: 0.8 MG/DL (ref 0.57–1)
EGFRCR SERPLBLD CKD-EPI 2021: 80 ML/MIN/1.73
EOSINOPHIL # BLD AUTO: 0.1 X10E3/UL (ref 0–0.4)
EOSINOPHIL NFR BLD AUTO: 3 %
ERYTHROCYTE [DISTWIDTH] IN BLOOD BY AUTOMATED COUNT: 15 % (ref 11.7–15.4)
GLOBULIN SER CALC-MCNC: 2.8 G/DL (ref 1.5–4.5)
GLUCOSE SERPL-MCNC: 90 MG/DL (ref 70–99)
HBA1C MFR BLD: 5.9 % (ref 4.8–5.6)
HCT VFR BLD AUTO: 40.2 % (ref 34–46.6)
HDLC SERPL-MCNC: 75 MG/DL
HGB BLD-MCNC: 13.5 G/DL (ref 11.1–15.9)
IMM GRANULOCYTES # BLD AUTO: 0 X10E3/UL (ref 0–0.1)
IMM GRANULOCYTES NFR BLD AUTO: 0 %
LDLC SERPL CALC-MCNC: 130 MG/DL (ref 0–99)
LYMPHOCYTES # BLD AUTO: 1.8 X10E3/UL (ref 0.7–3.1)
LYMPHOCYTES NFR BLD AUTO: 44 %
MCH RBC QN AUTO: 27 PG (ref 26.6–33)
MCHC RBC AUTO-ENTMCNC: 33.6 G/DL (ref 31.5–35.7)
MCV RBC AUTO: 80 FL (ref 79–97)
MONOCYTES # BLD AUTO: 0.3 X10E3/UL (ref 0.1–0.9)
MONOCYTES NFR BLD AUTO: 7 %
NEUTROPHILS # BLD AUTO: 1.8 X10E3/UL (ref 1.4–7)
NEUTROPHILS NFR BLD AUTO: 45 %
PLATELET # BLD AUTO: 176 X10E3/UL (ref 150–450)
POTASSIUM SERPL-SCNC: 4.4 MMOL/L (ref 3.5–5.2)
PROT SERPL-MCNC: 7 G/DL (ref 6–8.5)
RBC # BLD AUTO: 5 X10E6/UL (ref 3.77–5.28)
SODIUM SERPL-SCNC: 142 MMOL/L (ref 134–144)
SPECIMEN STATUS REPORT: NORMAL
TRIGL SERPL-MCNC: 84 MG/DL (ref 0–149)
TSH SERPL DL<=0.005 MIU/L-ACNC: 0.93 UIU/ML (ref 0.45–4.5)
VLDLC SERPL CALC-MCNC: 15 MG/DL (ref 5–40)
WBC # BLD AUTO: 3.9 X10E3/UL (ref 3.4–10.8)

## 2023-05-10 ENCOUNTER — TELEPHONE (OUTPATIENT)
Facility: CLINIC | Age: 68
End: 2023-05-10

## 2023-07-20 ENCOUNTER — OFFICE VISIT (OUTPATIENT)
Facility: CLINIC | Age: 68
End: 2023-07-20
Payer: MEDICARE

## 2023-07-20 VITALS
BODY MASS INDEX: 34.27 KG/M2 | TEMPERATURE: 98.4 F | SYSTOLIC BLOOD PRESSURE: 123 MMHG | RESPIRATION RATE: 16 BRPM | HEIGHT: 59 IN | HEART RATE: 83 BPM | DIASTOLIC BLOOD PRESSURE: 76 MMHG | WEIGHT: 170 LBS | OXYGEN SATURATION: 97 %

## 2023-07-20 DIAGNOSIS — E78.5 HYPERLIPIDEMIA, UNSPECIFIED HYPERLIPIDEMIA TYPE: ICD-10-CM

## 2023-07-20 DIAGNOSIS — G47.33 OSA (OBSTRUCTIVE SLEEP APNEA): ICD-10-CM

## 2023-07-20 DIAGNOSIS — F51.04 CHRONIC INSOMNIA: ICD-10-CM

## 2023-07-20 DIAGNOSIS — G47.00 INSOMNIA, UNSPECIFIED TYPE: ICD-10-CM

## 2023-07-20 DIAGNOSIS — I10 ESSENTIAL HYPERTENSION: ICD-10-CM

## 2023-07-20 DIAGNOSIS — R73.01 IMPAIRED FASTING GLUCOSE: Primary | ICD-10-CM

## 2023-07-20 LAB — HBA1C MFR BLD: 5.5 %

## 2023-07-20 PROCEDURE — 3078F DIAST BP <80 MM HG: CPT | Performed by: INTERNAL MEDICINE

## 2023-07-20 PROCEDURE — 1036F TOBACCO NON-USER: CPT | Performed by: INTERNAL MEDICINE

## 2023-07-20 PROCEDURE — 99214 OFFICE O/P EST MOD 30 MIN: CPT | Performed by: INTERNAL MEDICINE

## 2023-07-20 PROCEDURE — G8417 CALC BMI ABV UP PARAM F/U: HCPCS | Performed by: INTERNAL MEDICINE

## 2023-07-20 PROCEDURE — 1090F PRES/ABSN URINE INCON ASSESS: CPT | Performed by: INTERNAL MEDICINE

## 2023-07-20 PROCEDURE — G8427 DOCREV CUR MEDS BY ELIG CLIN: HCPCS | Performed by: INTERNAL MEDICINE

## 2023-07-20 PROCEDURE — 3074F SYST BP LT 130 MM HG: CPT | Performed by: INTERNAL MEDICINE

## 2023-07-20 PROCEDURE — 1123F ACP DISCUSS/DSCN MKR DOCD: CPT | Performed by: INTERNAL MEDICINE

## 2023-07-20 PROCEDURE — 3017F COLORECTAL CA SCREEN DOC REV: CPT | Performed by: INTERNAL MEDICINE

## 2023-07-20 PROCEDURE — G8399 PT W/DXA RESULTS DOCUMENT: HCPCS | Performed by: INTERNAL MEDICINE

## 2023-07-20 PROCEDURE — 83036 HEMOGLOBIN GLYCOSYLATED A1C: CPT | Performed by: INTERNAL MEDICINE

## 2023-07-20 RX ORDER — DOXEPIN HYDROCHLORIDE 10 MG/1
10 CAPSULE ORAL NIGHTLY
Qty: 90 CAPSULE | Refills: 1 | Status: CANCELLED | OUTPATIENT
Start: 2023-07-20

## 2023-07-20 RX ORDER — TRAZODONE HYDROCHLORIDE 50 MG/1
TABLET ORAL
Qty: 90 TABLET | Refills: 0 | OUTPATIENT
Start: 2023-07-20

## 2023-07-20 RX ORDER — DOXEPIN HYDROCHLORIDE 10 MG/1
CAPSULE ORAL
COMMUNITY
Start: 2023-06-29

## 2023-07-20 NOTE — PROGRESS NOTES
Assessment/ Plan:   Radha Willis was seen today for follow-up chronic condition. Diagnoses and all orders for this visit:    Impaired fasting glucose-A1c at goal, continue with weight loss efforts   -     AMB POC HEMOGLOBIN A1C    Hyperlipidemia, unspecified hyperlipidemia type-goal LDL of less than 100; may need Repatha if still not at goal  -     Comprehensive Metabolic Panel; Future  -     Lipid Panel; Future    Essential hypertension-continue with Losartan and home BP monitoring    Insomnia, unspecified type-on Doxepin from sleep doc    ESTELLA (obstructive sleep apnea)-compliant with CPAP        Follow-up and Dispositions    Return in about 3 months (around 10/20/2023). Chief Complaint   Patient presents with    Follow-up Chronic Condition     3 month       Pt is a 75y.o. year old female who presents for follow up of her chronic medical problems    Health Maintenance Due   Topic Date Due    Shingles vaccine (1 of 2) Never done    Pneumococcal 65+ years Vaccine (2 - PPSV23 if available, else PCV20) 01/15/2021    COVID-19 Vaccine (4 - Booster for Pfizer series) 03/04/2022    Breast cancer screen -normal 03/23/2023      Wt Readings from Last 3 Encounters:   07/20/23 170 lb (77.1 kg)   04/20/23 173 lb (78.5 kg)   01/17/23 170 lb (77.1 kg)        BP Readings from Last 3 Encounters:   07/20/23 123/76   04/20/23 131/73   01/17/23 138/79    Took meds today? No  Home Bps-normal 123/76 this morning at home  Repeat BP-     Lab Results   Component Value Date/Time    CHOL 220 04/20/2023 10:20 AM    CHOL 158 01/17/2023 10:09 AM    TRIG 84 04/20/2023 10:20 AM    HDL 75 04/20/2023 10:20 AM    LDLCALC 130 04/20/2023 10:20 AM    ?taking Crestor-yes  Fasting today    Hemoglobin A1C   Date Value Ref Range Status   04/20/2023 5.9 (H) 4.8 - 5.6 % Final     Comment:              Prediabetes: 5.7 - 6.4           Diabetes: >6.4           Glycemic control for adults with diabetes: <7.0       Hemoglobin A1C, POC   Date

## 2023-07-20 NOTE — PROGRESS NOTES
1. \"Have you been to the ER, urgent care clinic since your last visit? Hospitalized since your last visit? \" No    2. \"Have you seen or consulted any other health care providers outside of the [address] [place] [address] since your last visit? \" Yes Follow up with Sleep doctor. 3. For patients aged 43-73: Has the patient had a colonoscopy / FIT/ Cologuard? Yes No care Gap      If the patient is female:    4. For patients aged 43-66: Has the patient had a mammogram within the past 2 years? No- Due    5. For patients aged 21-65: Has the patient had a pap smear?  NA - based on age or sex    Health Maintenance Due   Topic Date Due    Shingles vaccine (1 of 2) Never done    Pneumococcal 65+ years Vaccine (2 - PPSV23 if available, else PCV20) 01/15/2021    COVID-19 Vaccine (4 - Booster for Pfizer series) 03/04/2022    Breast cancer screen  03/23/2023

## 2023-07-21 LAB
ALBUMIN SERPL-MCNC: 4.2 G/DL (ref 3.9–4.9)
ALBUMIN/GLOB SERPL: 1.4 {RATIO} (ref 1.2–2.2)
ALP SERPL-CCNC: 83 IU/L (ref 44–121)
ALT SERPL-CCNC: 19 IU/L (ref 0–32)
AST SERPL-CCNC: 15 IU/L (ref 0–40)
BILIRUB SERPL-MCNC: 0.2 MG/DL (ref 0–1.2)
BUN SERPL-MCNC: 11 MG/DL (ref 8–27)
BUN/CREAT SERPL: 12 (ref 12–28)
CALCIUM SERPL-MCNC: 9.4 MG/DL (ref 8.7–10.3)
CHLORIDE SERPL-SCNC: 102 MMOL/L (ref 96–106)
CHOLEST SERPL-MCNC: 233 MG/DL (ref 100–199)
CO2 SERPL-SCNC: 25 MMOL/L (ref 20–29)
CREAT SERPL-MCNC: 0.92 MG/DL (ref 0.57–1)
EGFRCR SERPLBLD CKD-EPI 2021: 68 ML/MIN/1.73
GLOBULIN SER CALC-MCNC: 2.9 G/DL (ref 1.5–4.5)
GLUCOSE SERPL-MCNC: 99 MG/DL (ref 70–99)
HDLC SERPL-MCNC: 69 MG/DL
LDLC SERPL CALC-MCNC: 153 MG/DL (ref 0–99)
POTASSIUM SERPL-SCNC: 4.3 MMOL/L (ref 3.5–5.2)
PROT SERPL-MCNC: 7.1 G/DL (ref 6–8.5)
SODIUM SERPL-SCNC: 141 MMOL/L (ref 134–144)
SPECIMEN STATUS REPORT: NORMAL
TRIGL SERPL-MCNC: 63 MG/DL (ref 0–149)
VLDLC SERPL CALC-MCNC: 11 MG/DL (ref 5–40)

## 2023-08-28 RX ORDER — ROSUVASTATIN CALCIUM 20 MG/1
TABLET, COATED ORAL
Qty: 90 TABLET | Refills: 1 | Status: SHIPPED | OUTPATIENT
Start: 2023-08-28

## 2023-09-11 RX ORDER — LOSARTAN POTASSIUM 50 MG/1
50 TABLET ORAL DAILY
Qty: 90 TABLET | Refills: 1 | Status: SHIPPED | OUTPATIENT
Start: 2023-09-11

## 2023-10-24 ENCOUNTER — OFFICE VISIT (OUTPATIENT)
Facility: CLINIC | Age: 68
End: 2023-10-24
Payer: MEDICARE

## 2023-10-24 VITALS
TEMPERATURE: 98.3 F | HEIGHT: 59 IN | WEIGHT: 170 LBS | SYSTOLIC BLOOD PRESSURE: 130 MMHG | BODY MASS INDEX: 34.27 KG/M2 | HEART RATE: 70 BPM | OXYGEN SATURATION: 98 % | RESPIRATION RATE: 16 BRPM | DIASTOLIC BLOOD PRESSURE: 80 MMHG

## 2023-10-24 DIAGNOSIS — G47.33 OSA (OBSTRUCTIVE SLEEP APNEA): ICD-10-CM

## 2023-10-24 DIAGNOSIS — R73.01 IMPAIRED FASTING GLUCOSE: ICD-10-CM

## 2023-10-24 DIAGNOSIS — E78.5 HYPERLIPIDEMIA, UNSPECIFIED HYPERLIPIDEMIA TYPE: Primary | ICD-10-CM

## 2023-10-24 DIAGNOSIS — I10 ESSENTIAL HYPERTENSION: ICD-10-CM

## 2023-10-24 DIAGNOSIS — M17.0 PRIMARY OSTEOARTHRITIS OF BOTH KNEES: ICD-10-CM

## 2023-10-24 DIAGNOSIS — G47.00 INSOMNIA, UNSPECIFIED TYPE: ICD-10-CM

## 2023-10-24 LAB — HBA1C MFR BLD: 5.7 %

## 2023-10-24 PROCEDURE — 3017F COLORECTAL CA SCREEN DOC REV: CPT | Performed by: INTERNAL MEDICINE

## 2023-10-24 PROCEDURE — 3078F DIAST BP <80 MM HG: CPT | Performed by: INTERNAL MEDICINE

## 2023-10-24 PROCEDURE — 83036 HEMOGLOBIN GLYCOSYLATED A1C: CPT | Performed by: INTERNAL MEDICINE

## 2023-10-24 PROCEDURE — G8427 DOCREV CUR MEDS BY ELIG CLIN: HCPCS | Performed by: INTERNAL MEDICINE

## 2023-10-24 PROCEDURE — 99214 OFFICE O/P EST MOD 30 MIN: CPT | Performed by: INTERNAL MEDICINE

## 2023-10-24 PROCEDURE — 1123F ACP DISCUSS/DSCN MKR DOCD: CPT | Performed by: INTERNAL MEDICINE

## 2023-10-24 PROCEDURE — G8399 PT W/DXA RESULTS DOCUMENT: HCPCS | Performed by: INTERNAL MEDICINE

## 2023-10-24 PROCEDURE — 1036F TOBACCO NON-USER: CPT | Performed by: INTERNAL MEDICINE

## 2023-10-24 PROCEDURE — G8417 CALC BMI ABV UP PARAM F/U: HCPCS | Performed by: INTERNAL MEDICINE

## 2023-10-24 PROCEDURE — 1090F PRES/ABSN URINE INCON ASSESS: CPT | Performed by: INTERNAL MEDICINE

## 2023-10-24 PROCEDURE — 3075F SYST BP GE 130 - 139MM HG: CPT | Performed by: INTERNAL MEDICINE

## 2023-10-24 PROCEDURE — G8484 FLU IMMUNIZE NO ADMIN: HCPCS | Performed by: INTERNAL MEDICINE

## 2023-10-24 RX ORDER — TRAZODONE HYDROCHLORIDE 50 MG/1
50 TABLET ORAL NIGHTLY
Qty: 30 TABLET | Refills: 3 | Status: SHIPPED | OUTPATIENT
Start: 2023-10-24

## 2023-10-24 NOTE — PATIENT INSTRUCTIONS
dip.  Sprinkle sunflower seeds or chopped almonds over salads. Or try adding chopped walnuts or almonds to cooked vegetables. Try some vegetarian meals using beans and peas. Add garbanzo or kidney beans to salads. Make burritos and tacos with mashed delgado beans or black beans.

## 2023-10-25 LAB
ALBUMIN SERPL-MCNC: 4.3 G/DL (ref 3.9–4.9)
ALBUMIN/GLOB SERPL: 1.5 {RATIO} (ref 1.2–2.2)
ALP SERPL-CCNC: 92 IU/L (ref 44–121)
ALT SERPL-CCNC: 20 IU/L (ref 0–32)
AST SERPL-CCNC: 17 IU/L (ref 0–40)
BASOPHILS # BLD AUTO: 0 X10E3/UL (ref 0–0.2)
BASOPHILS NFR BLD AUTO: 1 %
BILIRUB SERPL-MCNC: 0.2 MG/DL (ref 0–1.2)
BUN SERPL-MCNC: 14 MG/DL (ref 8–27)
BUN/CREAT SERPL: 15 (ref 12–28)
CALCIUM SERPL-MCNC: 9.3 MG/DL (ref 8.7–10.3)
CHLORIDE SERPL-SCNC: 104 MMOL/L (ref 96–106)
CHOLEST SERPL-MCNC: 223 MG/DL (ref 100–199)
CO2 SERPL-SCNC: 25 MMOL/L (ref 20–29)
CREAT SERPL-MCNC: 0.95 MG/DL (ref 0.57–1)
EGFRCR SERPLBLD CKD-EPI 2021: 65 ML/MIN/1.73
EOSINOPHIL # BLD AUTO: 0.2 X10E3/UL (ref 0–0.4)
EOSINOPHIL NFR BLD AUTO: 5 %
ERYTHROCYTE [DISTWIDTH] IN BLOOD BY AUTOMATED COUNT: 15.3 % (ref 11.7–15.4)
GLOBULIN SER CALC-MCNC: 2.9 G/DL (ref 1.5–4.5)
GLUCOSE SERPL-MCNC: 97 MG/DL (ref 70–99)
HCT VFR BLD AUTO: 45.3 % (ref 34–46.6)
HDLC SERPL-MCNC: 72 MG/DL
HGB BLD-MCNC: 14.1 G/DL (ref 11.1–15.9)
IMM GRANULOCYTES # BLD AUTO: 0 X10E3/UL (ref 0–0.1)
IMM GRANULOCYTES NFR BLD AUTO: 0 %
LDLC SERPL CALC-MCNC: 140 MG/DL (ref 0–99)
LYMPHOCYTES # BLD AUTO: 1.7 X10E3/UL (ref 0.7–3.1)
LYMPHOCYTES NFR BLD AUTO: 44 %
MCH RBC QN AUTO: 25 PG (ref 26.6–33)
MCHC RBC AUTO-ENTMCNC: 31.1 G/DL (ref 31.5–35.7)
MCV RBC AUTO: 80 FL (ref 79–97)
MONOCYTES # BLD AUTO: 0.3 X10E3/UL (ref 0.1–0.9)
MONOCYTES NFR BLD AUTO: 7 %
NEUTROPHILS # BLD AUTO: 1.6 X10E3/UL (ref 1.4–7)
NEUTROPHILS NFR BLD AUTO: 43 %
PLATELET # BLD AUTO: 180 X10E3/UL (ref 150–450)
POTASSIUM SERPL-SCNC: 4.2 MMOL/L (ref 3.5–5.2)
PROT SERPL-MCNC: 7.2 G/DL (ref 6–8.5)
RBC # BLD AUTO: 5.64 X10E6/UL (ref 3.77–5.28)
SODIUM SERPL-SCNC: 144 MMOL/L (ref 134–144)
SPECIMEN STATUS REPORT: NORMAL
TRIGL SERPL-MCNC: 62 MG/DL (ref 0–149)
VLDLC SERPL CALC-MCNC: 11 MG/DL (ref 5–40)
WBC # BLD AUTO: 3.8 X10E3/UL (ref 3.4–10.8)

## 2023-11-30 DIAGNOSIS — R51.9 NONINTRACTABLE HEADACHE, UNSPECIFIED CHRONICITY PATTERN, UNSPECIFIED HEADACHE TYPE: Primary | ICD-10-CM

## 2024-01-29 ENCOUNTER — OFFICE VISIT (OUTPATIENT)
Facility: CLINIC | Age: 69
End: 2024-01-29

## 2024-01-29 VITALS
RESPIRATION RATE: 14 BRPM | DIASTOLIC BLOOD PRESSURE: 83 MMHG | BODY MASS INDEX: 34.07 KG/M2 | WEIGHT: 169 LBS | TEMPERATURE: 98.4 F | HEART RATE: 89 BPM | HEIGHT: 59 IN | OXYGEN SATURATION: 96 % | SYSTOLIC BLOOD PRESSURE: 131 MMHG

## 2024-01-29 DIAGNOSIS — I10 ESSENTIAL HYPERTENSION: ICD-10-CM

## 2024-01-29 DIAGNOSIS — R73.01 IMPAIRED FASTING GLUCOSE: ICD-10-CM

## 2024-01-29 DIAGNOSIS — R07.9 CHEST PAIN, UNSPECIFIED TYPE: ICD-10-CM

## 2024-01-29 DIAGNOSIS — E78.5 HYPERLIPIDEMIA, UNSPECIFIED HYPERLIPIDEMIA TYPE: ICD-10-CM

## 2024-01-29 DIAGNOSIS — R20.2 NUMBNESS AND TINGLING OF RIGHT SIDE OF FACE: ICD-10-CM

## 2024-01-29 DIAGNOSIS — R20.0 NUMBNESS AND TINGLING OF RIGHT SIDE OF FACE: ICD-10-CM

## 2024-01-29 DIAGNOSIS — Z00.00 MEDICARE ANNUAL WELLNESS VISIT, SUBSEQUENT: Primary | ICD-10-CM

## 2024-01-29 DIAGNOSIS — G47.33 OSA (OBSTRUCTIVE SLEEP APNEA): ICD-10-CM

## 2024-01-29 DIAGNOSIS — G89.29 CHRONIC PAIN OF LEFT KNEE: ICD-10-CM

## 2024-01-29 DIAGNOSIS — M25.562 CHRONIC PAIN OF LEFT KNEE: ICD-10-CM

## 2024-01-29 ASSESSMENT — LIFESTYLE VARIABLES
HOW OFTEN DO YOU HAVE A DRINK CONTAINING ALCOHOL: NEVER
HOW MANY STANDARD DRINKS CONTAINING ALCOHOL DO YOU HAVE ON A TYPICAL DAY: PATIENT DOES NOT DRINK

## 2024-01-29 ASSESSMENT — PATIENT HEALTH QUESTIONNAIRE - PHQ9
SUM OF ALL RESPONSES TO PHQ QUESTIONS 1-9: 1
SUM OF ALL RESPONSES TO PHQ9 QUESTIONS 1 & 2: 1
SUM OF ALL RESPONSES TO PHQ QUESTIONS 1-9: 1
2. FEELING DOWN, DEPRESSED OR HOPELESS: 1
1. LITTLE INTEREST OR PLEASURE IN DOING THINGS: 0
SUM OF ALL RESPONSES TO PHQ QUESTIONS 1-9: 1
SUM OF ALL RESPONSES TO PHQ QUESTIONS 1-9: 1

## 2024-01-29 NOTE — PROGRESS NOTES
\"Have you been to the ER, urgent care clinic since your last visit?  Hospitalized since your last visit?\"    NO    “Have you seen or consulted any other health care providers outside of [facility] since your last visit?”    NO

## 2024-01-29 NOTE — PROGRESS NOTES
Medicare Annual Wellness Visit    Erin Frances is here for Medicare AWV and Follow-up Chronic Condition (3 month)    Assessment & Plan   Medicare annual wellness visit, subsequent-advised on vaccines needed    Essential hypertension-advised to take Losartan 50 mg daily  -     External Referral To Cardiology  -     CBC; Future  -     Comprehensive Metabolic Panel; Future    Chest pain, unspecified type-has seen Dr Wallis who has since retired ans she is requesting to see Dr Perry based on her research from providers in her network  -     External Referral To Cardiology  Patient is also worried about a murmur which she was told she has by the [facility] rep that went to her house. I did not hear a murmur today and there is no documentation of murmur at her cardio visit; echo did not show any valvular disease    Hyperlipidemia, unspecified hyperlipidemia type-goal LDL of less than 70 on Crestor  -     External Referral To Cardiology  -     Lipid Panel; Future    Impaired fasting glucose-continue to watch diet; will start Metformin for A1c above 7%  -     Hemoglobin A1C; Future    Numbness and tingling of right side of face-recurrent; given info on Neuro referral or she can ask Dr Martinez her sleep specialist who is also a neurologist if she can see her for this problem    ESTELLA (obstructive sleep apnea)-compliant with CPAP    Chronic pain of left knee-min OA on Xray and did not improve on inj and PT, will order MRI as it could be a ligament/tenon issue  -     diclofenac sodium (VOLTAREN) 1 % GEL    Recommendations for Preventive Services Due: see orders and patient instructions/AVS.  Recommended screening schedule for the next 5-10 years is provided to the patient in written form: see Patient Instructions/AVS.     Return in 3 months (on 4/29/2024).           Subjective     Health Maintenance Due   Topic Date Due    Shingles vaccine (1 of 2) Never done    Respiratory Syncytial Virus (RSV) Pregnant or age 60 yrs+ (1 -

## 2024-01-30 DIAGNOSIS — M25.562 CHRONIC PAIN OF LEFT KNEE: ICD-10-CM

## 2024-01-30 DIAGNOSIS — G89.29 CHRONIC PAIN OF LEFT KNEE: ICD-10-CM

## 2024-01-30 LAB
A/G RATIO: 1.5 RATIO (ref 1.1–2.6)
ALBUMIN SERPL-MCNC: 4.3 G/DL (ref 3.5–5)
ALP BLD-CCNC: 82 U/L (ref 40–120)
ALT SERPL-CCNC: 13 U/L (ref 5–40)
ANION GAP SERPL CALCULATED.3IONS-SCNC: 10 MMOL/L (ref 3–15)
AST SERPL-CCNC: 13 U/L (ref 10–37)
BILIRUB SERPL-MCNC: 0.3 MG/DL (ref 0.2–1.2)
BUN BLDV-MCNC: 14 MG/DL (ref 6–22)
CALCIUM SERPL-MCNC: 9.6 MG/DL (ref 8.4–10.5)
CHLORIDE BLD-SCNC: 105 MMOL/L (ref 98–110)
CHOLESTEROL/HDL RATIO: 3.3 (ref 0–5)
CHOLESTEROL: 257 MG/DL (ref 110–200)
CO2: 28 MMOL/L (ref 20–32)
CREAT SERPL-MCNC: 0.8 MG/DL (ref 0.8–1.4)
ESTIMATED AVERAGE GLUCOSE: 130 MG/DL (ref 91–123)
GLOBULIN: 2.8 G/DL (ref 2–4)
GLOMERULAR FILTRATION RATE: >60 ML/MIN/1.73 SQ.M.
GLUCOSE: 97 MG/DL (ref 70–99)
HBA1C MFR BLD: 6.2 % (ref 4.8–5.6)
HCT VFR BLD CALC: 45.1 % (ref 35.1–48.3)
HDLC SERPL-MCNC: 78 MG/DL
HEMOGLOBIN: 13.9 G/DL (ref 11.7–16.1)
LDL CHOLESTEROL CALCULATED: 164 MG/DL (ref 50–99)
LDL/HDL RATIO: 2.1
MCH RBC QN AUTO: 25 PG (ref 26–34)
MCHC RBC AUTO-ENTMCNC: 31 G/DL (ref 31–36)
MCV RBC AUTO: 80 FL (ref 80–99)
NON-HDL CHOLESTEROL: 179 MG/DL
PDW BLD-RTO: 17 % (ref 10–15.5)
PLATELET # BLD: 182 K/UL (ref 140–440)
PMV BLD AUTO: 11.3 FL (ref 9–13)
POTASSIUM SERPL-SCNC: 4.4 MMOL/L (ref 3.5–5.5)
RBC: 5.61 M/UL (ref 3.8–5.2)
SODIUM BLD-SCNC: 143 MMOL/L (ref 133–145)
TOTAL PROTEIN: 7.1 G/DL (ref 6.2–8.1)
TRIGL SERPL-MCNC: 74 MG/DL (ref 40–149)
VLDLC SERPL CALC-MCNC: 15 MG/DL (ref 8–30)
WBC: 3.2 K/UL (ref 4–11)

## 2024-01-30 NOTE — TELEPHONE ENCOUNTER
Chely Sanchez  [facility] Clinical Staff  Subject: Medication Problem     Medication: diclofenac sodium (VOLTAREN) 1 % GEL  Dosage: unknown  Ordering Provider: Dr. Grissom    Question/Problem: Pt also asking for \"90 day supply\", per recent  medication question [facility] [address] [address] [place] [address]      Pharmacy: [facility] - [address] [phone] - F [phone]    ---------------------------------------------------------------------------  --------------  CALL BACK INFO  [phone]; OK to leave message on voicemail  ---------------------------------------------------------------------------  --------------    SCRIPT ANSWERS  Relationship to Patient: Self

## 2024-02-12 ENCOUNTER — TELEPHONE (OUTPATIENT)
Facility: CLINIC | Age: 69
End: 2024-02-12

## 2024-02-12 NOTE — TELEPHONE ENCOUNTER
----- Message from April Bharathi sent at 2/9/2024  1:44 PM EST -----  Subject: Referral Request    Reason for referral request? patient states she needs a cardiology   referral as soon as possible. per patient her physician was supposed to   refer her to cardiology, but when she called the cardiologist Clarence Carreon,   they did not have a referral for patient. also patient states that when   she called neurology they were booking into july and patient requests   assistance with this as well, as she does not want to wait that long.   additionally patient would like the MRI of her knee to go to [id number]. not   [facility], as [facility] is closer   Provider patient wants to be referred to(if known):     Provider Phone Number(if known):    Additional Information for Provider? please call back, thank you   ---------------------------------------------------------------------------  --------------  CALL BACK INFO    [phone]; OK to leave message on voicemail  ---------------------------------------------------------------------------  --------------

## 2024-02-13 DIAGNOSIS — R20.2 NUMBNESS AND TINGLING OF RIGHT SIDE OF FACE: ICD-10-CM

## 2024-02-13 DIAGNOSIS — I10 ESSENTIAL HYPERTENSION: ICD-10-CM

## 2024-02-13 DIAGNOSIS — R20.0 NUMBNESS AND TINGLING OF RIGHT SIDE OF FACE: ICD-10-CM

## 2024-02-13 DIAGNOSIS — R07.9 CHEST PAIN, UNSPECIFIED TYPE: Primary | ICD-10-CM

## 2024-03-07 ENCOUNTER — TELEPHONE (OUTPATIENT)
Facility: CLINIC | Age: 69
End: 2024-03-07

## 2024-03-13 DIAGNOSIS — M25.562 CHRONIC PAIN OF LEFT KNEE: Primary | ICD-10-CM

## 2024-03-13 DIAGNOSIS — G89.29 CHRONIC PAIN OF LEFT KNEE: Primary | ICD-10-CM

## 2024-04-30 ENCOUNTER — OFFICE VISIT (OUTPATIENT)
Facility: CLINIC | Age: 69
End: 2024-04-30
Payer: MEDICARE

## 2024-04-30 VITALS
WEIGHT: 169 LBS | OXYGEN SATURATION: 95 % | SYSTOLIC BLOOD PRESSURE: 130 MMHG | HEIGHT: 59 IN | DIASTOLIC BLOOD PRESSURE: 79 MMHG | BODY MASS INDEX: 34.07 KG/M2 | HEART RATE: 82 BPM | RESPIRATION RATE: 16 BRPM | TEMPERATURE: 98.7 F

## 2024-04-30 DIAGNOSIS — I73.9 PVD (PERIPHERAL VASCULAR DISEASE) (HCC): ICD-10-CM

## 2024-04-30 DIAGNOSIS — R07.9 CHEST PAIN, UNSPECIFIED TYPE: ICD-10-CM

## 2024-04-30 DIAGNOSIS — M17.12 PRIMARY OSTEOARTHRITIS OF LEFT KNEE: ICD-10-CM

## 2024-04-30 DIAGNOSIS — E78.5 HYPERLIPIDEMIA, UNSPECIFIED HYPERLIPIDEMIA TYPE: ICD-10-CM

## 2024-04-30 DIAGNOSIS — G47.33 OSA (OBSTRUCTIVE SLEEP APNEA): ICD-10-CM

## 2024-04-30 DIAGNOSIS — G25.81 RESTLESS LEGS SYNDROME: ICD-10-CM

## 2024-04-30 DIAGNOSIS — I10 ESSENTIAL HYPERTENSION: Primary | ICD-10-CM

## 2024-04-30 DIAGNOSIS — R73.01 IMPAIRED FASTING GLUCOSE: ICD-10-CM

## 2024-04-30 PROCEDURE — 3075F SYST BP GE 130 - 139MM HG: CPT | Performed by: INTERNAL MEDICINE

## 2024-04-30 PROCEDURE — 3078F DIAST BP <80 MM HG: CPT | Performed by: INTERNAL MEDICINE

## 2024-04-30 PROCEDURE — 99214 OFFICE O/P EST MOD 30 MIN: CPT | Performed by: INTERNAL MEDICINE

## 2024-04-30 PROCEDURE — 1123F ACP DISCUSS/DSCN MKR DOCD: CPT | Performed by: INTERNAL MEDICINE

## 2024-04-30 RX ORDER — LOSARTAN POTASSIUM 50 MG/1
50 TABLET ORAL DAILY
Qty: 90 TABLET | Refills: 1 | Status: SHIPPED | OUTPATIENT
Start: 2024-04-30

## 2024-04-30 SDOH — ECONOMIC STABILITY: FOOD INSECURITY: WITHIN THE PAST 12 MONTHS, THE FOOD YOU BOUGHT JUST DIDN'T LAST AND YOU DIDN'T HAVE MONEY TO GET MORE.: NEVER TRUE

## 2024-04-30 SDOH — ECONOMIC STABILITY: FOOD INSECURITY: WITHIN THE PAST 12 MONTHS, YOU WORRIED THAT YOUR FOOD WOULD RUN OUT BEFORE YOU GOT MONEY TO BUY MORE.: NEVER TRUE

## 2024-04-30 SDOH — ECONOMIC STABILITY: INCOME INSECURITY: HOW HARD IS IT FOR YOU TO PAY FOR THE VERY BASICS LIKE FOOD, HOUSING, MEDICAL CARE, AND HEATING?: NOT HARD AT ALL

## 2024-04-30 ASSESSMENT — PATIENT HEALTH QUESTIONNAIRE - PHQ9
SUM OF ALL RESPONSES TO PHQ QUESTIONS 1-9: 0
SUM OF ALL RESPONSES TO PHQ QUESTIONS 1-9: 0
1. LITTLE INTEREST OR PLEASURE IN DOING THINGS: NOT AT ALL
SUM OF ALL RESPONSES TO PHQ QUESTIONS 1-9: 0
SUM OF ALL RESPONSES TO PHQ9 QUESTIONS 1 & 2: 0
2. FEELING DOWN, DEPRESSED OR HOPELESS: NOT AT ALL
SUM OF ALL RESPONSES TO PHQ QUESTIONS 1-9: 0

## 2024-04-30 NOTE — PROGRESS NOTES
\"Have you been to the ER, urgent care clinic since your last visit?  Hospitalized since your last visit?\"    NO    “Have you seen or consulted any other health care providers outside of [facility] since your last visit?”    Yes- Mammogram at [facility] 4/29/24            Click Here for Release of Records Request    
(RSV) Pregnant or age 60 yrs+ (1 - 1-dose 60+ series) Never done    Pneumococcal 65+ years Vaccine (2 of 2 - PPSV23 or PCV20) 01/15/2021      Since last visit:  [facility]  Willing to see another cardio  Did not like the replacement for Dr Wallis-will see Dr Ash as she declines to drive to [place]  ?heart murmur-told by insurance doc who went to her house; occl SOB  [facility]    [facility]  [address], [address]  [place] [address]  Phone [phone]  Fax [phone]    Erin Frances  : 1955  Osmar Wallis MD    Chief Complaint: Htn    (I10) Essential hypertension    (E78.9) Lipid disorder    (R06.02) SOB (shortness of breath)    (I42.2) Asymmetric septal hypertrophy (HCC) - Plan: ECHO CARDIOGRAM COMPLETE-IN HOUSE    Problem List:  Hypertension.  Dyspnea on exertion/Shortness of breath at night.  Echo 2020: EF 68%. Normal diastolic function. No valvular pathology.  Echo 2022: EF 65%. Normal diastolic function. No valvular pathology  Lipid disorder.    Assessment & Plan:    1. Essential hypertension  (mild septal hypertrophy)  Borderline controlled. Generally less than 140, confirmed at 142/80 today  Continue losartan.    2. Lipid disorder  On Crestor 20 mg.  May need Repatha  Labs thru PCP- has appt with Dr. Lujan .    3. Insomnia  Currently taking melatonin  Recommend discussing with PCP.    Evaluation of cardiac etiology   Currently no chest pain, no further evaluation  Most likely chest wall  We will treat with Aspercreme and lidocaine. If that fails we will consider    Follow-up will be scheduled in: 6 mos   BP Readings from Last 3 Encounters:   24 130/79   24 131/83   10/24/23 130/80    Taking BP med    Lab Results   Component Value Date/Time    CHOL 257 2024 08:53 AM    CHOL 158 2023 10:09 AM    TRIG 74 2024 08:53 AM    HDL 78 2024 08:53 AM    LDLCALC 164 2024

## 2024-05-01 LAB
A/G RATIO: 1.4 RATIO (ref 1.1–2.6)
ALBUMIN: 4.1 G/DL (ref 3.5–5)
ALP BLD-CCNC: 91 U/L (ref 40–120)
ALT SERPL-CCNC: 10 U/L (ref 5–40)
ANION GAP SERPL CALCULATED.3IONS-SCNC: 10 MMOL/L (ref 3–15)
AST SERPL-CCNC: 12 U/L (ref 10–37)
BILIRUB SERPL-MCNC: 0.2 MG/DL (ref 0.2–1.2)
BUN BLDV-MCNC: 14 MG/DL (ref 6–22)
CALCIUM SERPL-MCNC: 9.2 MG/DL (ref 8.4–10.5)
CHLORIDE BLD-SCNC: 105 MMOL/L (ref 98–110)
CHOLESTEROL, TOTAL: 217 MG/DL (ref 110–200)
CHOLESTEROL/HDL RATIO: 3 (ref 0–5)
CO2: 27 MMOL/L (ref 20–32)
CREAT SERPL-MCNC: 0.8 MG/DL (ref 0.8–1.4)
ESTIMATED AVERAGE GLUCOSE: 123 MG/DL (ref 91–123)
GFR, ESTIMATED: >60 ML/MIN/1.73 SQ.M.
GLOBULIN: 2.9 G/DL (ref 2–4)
GLUCOSE: 81 MG/DL (ref 70–99)
HBA1C MFR BLD: 5.9 % (ref 4.8–5.6)
HCT VFR BLD CALC: 45.5 % (ref 35.1–48.3)
HDLC SERPL-MCNC: 72 MG/DL
HEMOGLOBIN: 13.8 G/DL (ref 11.7–16.1)
LDL CHOLESTEROL: 133 MG/DL (ref 50–99)
LDL/HDL RATIO: 1.9
MCH RBC QN AUTO: 25 PG (ref 26–34)
MCHC RBC AUTO-ENTMCNC: 30 G/DL (ref 31–36)
MCV RBC AUTO: 83 FL (ref 80–99)
NON-HDL CHOLESTEROL: 145 MG/DL
PDW BLD-RTO: 17.1 % (ref 10–15.5)
PLATELET # BLD: 181 K/UL (ref 140–440)
PMV BLD AUTO: 11.5 FL (ref 9–13)
POTASSIUM SERPL-SCNC: 4.4 MMOL/L (ref 3.5–5.5)
RBC # BLD: 5.5 M/UL (ref 3.8–5.2)
SODIUM BLD-SCNC: 142 MMOL/L (ref 133–145)
TOTAL PROTEIN: 7 G/DL (ref 6.2–8.1)
TRIGL SERPL-MCNC: 57 MG/DL (ref 40–149)
VLDLC SERPL CALC-MCNC: 11 MG/DL (ref 8–30)
WBC # BLD: 4.5 K/UL (ref 4–11)

## 2024-05-07 DIAGNOSIS — I73.9 PVD (PERIPHERAL VASCULAR DISEASE) (HCC): Primary | ICD-10-CM

## 2024-07-03 LAB — LEFT VENTRICULAR EJECTION FRACTION, EXTERNAL: NORMAL

## 2024-07-09 ENCOUNTER — TELEPHONE (OUTPATIENT)
Facility: CLINIC | Age: 69
End: 2024-07-09

## 2024-08-26 ENCOUNTER — OFFICE VISIT (OUTPATIENT)
Facility: CLINIC | Age: 69
End: 2024-08-26
Payer: MEDICARE

## 2024-08-26 VITALS
HEIGHT: 59 IN | WEIGHT: 182.4 LBS | SYSTOLIC BLOOD PRESSURE: 127 MMHG | BODY MASS INDEX: 36.77 KG/M2 | OXYGEN SATURATION: 98 % | RESPIRATION RATE: 16 BRPM | TEMPERATURE: 98.3 F | DIASTOLIC BLOOD PRESSURE: 79 MMHG

## 2024-08-26 DIAGNOSIS — E78.5 HYPERLIPIDEMIA, UNSPECIFIED HYPERLIPIDEMIA TYPE: ICD-10-CM

## 2024-08-26 DIAGNOSIS — R63.5 WEIGHT GAIN: ICD-10-CM

## 2024-08-26 DIAGNOSIS — I10 ESSENTIAL HYPERTENSION: ICD-10-CM

## 2024-08-26 DIAGNOSIS — E66.01 SEVERE OBESITY (HCC): ICD-10-CM

## 2024-08-26 DIAGNOSIS — R73.01 IMPAIRED FASTING GLUCOSE: ICD-10-CM

## 2024-08-26 DIAGNOSIS — I10 ESSENTIAL HYPERTENSION: Primary | ICD-10-CM

## 2024-08-26 DIAGNOSIS — M25.562 CHRONIC PAIN OF LEFT KNEE: ICD-10-CM

## 2024-08-26 DIAGNOSIS — G47.33 OSA (OBSTRUCTIVE SLEEP APNEA): ICD-10-CM

## 2024-08-26 DIAGNOSIS — G89.29 CHRONIC PAIN OF LEFT KNEE: ICD-10-CM

## 2024-08-26 PROCEDURE — 3078F DIAST BP <80 MM HG: CPT | Performed by: INTERNAL MEDICINE

## 2024-08-26 PROCEDURE — 1123F ACP DISCUSS/DSCN MKR DOCD: CPT | Performed by: INTERNAL MEDICINE

## 2024-08-26 PROCEDURE — 3074F SYST BP LT 130 MM HG: CPT | Performed by: INTERNAL MEDICINE

## 2024-08-26 PROCEDURE — 99214 OFFICE O/P EST MOD 30 MIN: CPT | Performed by: INTERNAL MEDICINE

## 2024-08-26 ASSESSMENT — PATIENT HEALTH QUESTIONNAIRE - PHQ9
1. LITTLE INTEREST OR PLEASURE IN DOING THINGS: NOT AT ALL
SUM OF ALL RESPONSES TO PHQ QUESTIONS 1-9: 0
SUM OF ALL RESPONSES TO PHQ9 QUESTIONS 1 & 2: 0
SUM OF ALL RESPONSES TO PHQ QUESTIONS 1-9: 0
2. FEELING DOWN, DEPRESSED OR HOPELESS: NOT AT ALL
SUM OF ALL RESPONSES TO PHQ QUESTIONS 1-9: 0
SUM OF ALL RESPONSES TO PHQ QUESTIONS 1-9: 0

## 2024-08-26 NOTE — PROGRESS NOTES
Assessment/ Plan:   Erin was seen today for follow-up chronic condition.    Diagnoses and all orders for this visit:    Essential hypertension-BP controlled on Losartan, advised compliance  -     CBC; Future  -     Comprehensive Metabolic Panel; Future    Hyperlipidemia, unspecified hyperlipidemia type-unable to tolerate several statins tried; discussed Repatha and she is agreeable to this  -     Lipid Panel; Future  The 10-year ASCVD risk score (Nova DELVALLE, et al., 2019) is: 12.1%    Values used to calculate the score:      Age: 68 years      Sex: Female      Is Non- : Yes      Diabetic: No      Tobacco smoker: No      Systolic Blood Pressure: 127 mmHg      Is BP treated: Yes      HDL Cholesterol: 72 mg/dL      Total Cholesterol: 217 mg/dL   Impaired fasting glucose-continue with efforts aT w eight loss; agreEd to GLP1 if A1c is above 6.5%  -     Hemoglobin A1C; Future    ESTELLA (obstructive sleep apnea)-compliant with CPAP    Chronic pain of left knee-has a brace on; had previous MRI    Weight gain-etio?  -     TSH; Future    Severe Obesity-discussed limiting aleah to 6191-2351/day and exercising at least 150 min a week     Follow-up and Dispositions    Return in about 3 months (around 11/26/2024) for follow up.                 Chief Complaint   Patient presents with    Follow-up Chronic Condition     3 month follow up       Pt is a 68 y.o. year old female who presents for follow up of her chronic medical problems    Health Maintenance Due   Topic Date Due    Shingles vaccine (1 of 2) Never done    Respiratory Syncytial Virus (RSV) Pregnant or age 60 yrs+ (1 - 1-dose 60+ series) Never done    Pneumococcal 65+ years Vaccine (2 of 2 - PPSV23 or PCV20) 01/15/2021    Flu vaccine (1) 08/01/2024      Wt Readings from Last 3 Encounters:   08/26/24 82.7 kg (182 lb 6.4 oz)   04/30/24 76.7 kg (169 lb)   01/29/24 76.7 kg (169 lb)      Lab Results   Component Value Date    TSH 0.931 04/20/2023        BP  mass 9/17/2015       Current Outpatient Medications   Medication Sig Dispense Refill    losartan (COZAAR) 50 MG tablet Take 1 tablet by mouth daily 90 tablet 1    Ferrous Fumarate 325 (106 Fe) MG TABS Take by mouth      aspirin 81 MG chewable tablet Take 1 tablet by mouth daily      Calcium Carb-Cholecalciferol 600-20 MG-MCG CHEW Take 1 tablet by mouth daily      Cyanocobalamin 500 MCG SUBL Place 1 tablet under the tongue daily       No current facility-administered medications for this visit.       Social History     Tobacco Use   Smoking Status Never   Smokeless Tobacco Never       Allergies   Allergen Reactions    Latex Rash    Atorvastatin Itching    Naproxen Itching    Strawberry Itching and Swelling     Per pt report    Amlodipine Rash       Patient Labs were reviewed: yes    Patient Past Records were reviewed: yes      Objective:     Vitals:    08/26/24 0939 08/26/24 1029   BP: (!) 143/80 127/79   Resp: 16    Temp: 98.3 °F (36.8 °C)    TempSrc: Temporal    SpO2: 98%    Weight: 82.7 kg (182 lb 6.4 oz)    Height: 1.499 m (4' 11\")      Body mass index is 36.84 kg/m².    Exam:   Appearance: alert, well appearing,  oriented to person, place, and time, acyanotic, in no respiratory distress and well hydrated.  HEENT:  NC/AT, pink conj, anicteric sclerae  Neck:  No cervical lymphadenopathy, no JVD, no thyromegaly, no carotid bruit  Heart:  RRR without M/R/G  Lungs:  CTAB, no rhonchi, rales, or wheezes with good air exchange   Abdomen:  Non-tender, pos bowel sounds, no hepatosplenomegaly  Ext:  No C/C/E    Skin: no rash  Neuro: no lateralizing signs, CNs II-XII intact            I have discussed the diagnosis with the patient and the intended plan as seen in the above orders.  The patient has received an After-Visit Summary and questions were answered concerning future plans.     Medication Side Effects and Warnings were discussed with patient: yes    Patient verbalized understanding of above instructions.    Lea

## 2024-08-26 NOTE — PROGRESS NOTES
\"Have you been to the ER, urgent care clinic since your last visit?  Hospitalized since your last visit?\"    NO    “Have you seen or consulted any other health care providers outside of [facility] since your last visit?”    Yes- Cardiologist in 7/2024, neurologist 8/2024 and [facility] for poor leg circulation             Click Here for Release of Records Request

## 2024-08-28 LAB
A/G RATIO: 1.7 RATIO (ref 1.1–2.6)
ALBUMIN: 4.3 G/DL (ref 3.5–5)
ALP BLD-CCNC: 84 U/L (ref 40–120)
ALT SERPL-CCNC: 18 U/L (ref 5–40)
ANION GAP SERPL CALCULATED.3IONS-SCNC: 9 MMOL/L (ref 3–15)
AST SERPL-CCNC: 18 U/L (ref 10–37)
BILIRUB SERPL-MCNC: 0.3 MG/DL (ref 0.2–1.2)
BUN BLDV-MCNC: 14 MG/DL (ref 6–22)
CALCIUM SERPL-MCNC: 8.9 MG/DL (ref 8.4–10.5)
CHLORIDE BLD-SCNC: 102 MMOL/L (ref 98–110)
CHOLESTEROL, TOTAL: 213 MG/DL (ref 110–200)
CHOLESTEROL/HDL RATIO: 3.2 (ref 0–5)
CO2: 26 MMOL/L (ref 20–32)
CREAT SERPL-MCNC: 0.7 MG/DL (ref 0.8–1.4)
ESTIMATED AVERAGE GLUCOSE: 122 MG/DL (ref 91–123)
GFR, ESTIMATED: >60 ML/MIN/1.73 SQ.M.
GLOBULIN: 2.6 G/DL (ref 2–4)
GLUCOSE: 94 MG/DL (ref 70–99)
HBA1C MFR BLD: 5.9 % (ref 4.8–5.6)
HCT VFR BLD CALC: 41.7 % (ref 35.1–48.3)
HDLC SERPL-MCNC: 67 MG/DL
HEMOGLOBIN: 13.3 G/DL (ref 11.7–16.1)
LDL CHOLESTEROL: 134 MG/DL (ref 50–99)
LDL/HDL RATIO: 2
MCH RBC QN AUTO: 25 PG (ref 26–34)
MCHC RBC AUTO-ENTMCNC: 32 G/DL (ref 31–36)
MCV RBC AUTO: 79 FL (ref 80–99)
NON-HDL CHOLESTEROL: 146 MG/DL
PDW BLD-RTO: 16.6 % (ref 10–15.5)
PLATELET # BLD: 174 K/UL (ref 140–440)
PMV BLD AUTO: 11.1 FL (ref 9–13)
POTASSIUM SERPL-SCNC: 4.1 MMOL/L (ref 3.5–5.5)
RBC # BLD: 5.25 M/UL (ref 3.8–5.2)
SODIUM BLD-SCNC: 137 MMOL/L (ref 133–145)
TOTAL PROTEIN: 6.9 G/DL (ref 6.2–8.1)
TRIGL SERPL-MCNC: 60 MG/DL (ref 40–149)
TSH SERPL DL<=0.05 MIU/L-ACNC: 1.14 MCU/ML (ref 0.27–4.2)
VLDLC SERPL CALC-MCNC: 12 MG/DL (ref 8–30)
WBC # BLD: 3.7 K/UL (ref 4–11)

## 2024-11-20 ENCOUNTER — OFFICE VISIT (OUTPATIENT)
Facility: CLINIC | Age: 69
End: 2024-11-20
Payer: MEDICARE

## 2024-11-20 VITALS
RESPIRATION RATE: 16 BRPM | HEIGHT: 59 IN | OXYGEN SATURATION: 95 % | BODY MASS INDEX: 34.47 KG/M2 | SYSTOLIC BLOOD PRESSURE: 158 MMHG | TEMPERATURE: 98.3 F | WEIGHT: 171 LBS | DIASTOLIC BLOOD PRESSURE: 87 MMHG | HEART RATE: 67 BPM

## 2024-11-20 DIAGNOSIS — I10 ESSENTIAL HYPERTENSION: ICD-10-CM

## 2024-11-20 DIAGNOSIS — R30.0 BURNING WITH URINATION: Primary | ICD-10-CM

## 2024-11-20 LAB
BILIRUBIN, URINE, POC: NEGATIVE
BLOOD URINE, POC: NEGATIVE
GLUCOSE URINE, POC: NEGATIVE
KETONES, URINE, POC: NEGATIVE
LEUKOCYTE ESTERASE, URINE, POC: NORMAL
NITRITE, URINE, POC: NEGATIVE
PH, URINE, POC: 7.5 (ref 4.6–8)
PROTEIN,URINE, POC: NORMAL
SPECIFIC GRAVITY, URINE, POC: 1 (ref 1–1.03)
URINALYSIS CLARITY, POC: CLEAR
URINALYSIS COLOR, POC: YELLOW
UROBILINOGEN, POC: NORMAL

## 2024-11-20 PROCEDURE — 99213 OFFICE O/P EST LOW 20 MIN: CPT | Performed by: INTERNAL MEDICINE

## 2024-11-20 PROCEDURE — 81003 URINALYSIS AUTO W/O SCOPE: CPT | Performed by: INTERNAL MEDICINE

## 2024-11-20 PROCEDURE — 3079F DIAST BP 80-89 MM HG: CPT | Performed by: INTERNAL MEDICINE

## 2024-11-20 PROCEDURE — 1126F AMNT PAIN NOTED NONE PRSNT: CPT | Performed by: INTERNAL MEDICINE

## 2024-11-20 PROCEDURE — 3077F SYST BP >= 140 MM HG: CPT | Performed by: INTERNAL MEDICINE

## 2024-11-20 PROCEDURE — 1123F ACP DISCUSS/DSCN MKR DOCD: CPT | Performed by: INTERNAL MEDICINE

## 2024-11-20 PROCEDURE — 1159F MED LIST DOCD IN RCRD: CPT | Performed by: INTERNAL MEDICINE

## 2024-11-20 PROCEDURE — 1160F RVW MEDS BY RX/DR IN RCRD: CPT | Performed by: INTERNAL MEDICINE

## 2024-11-20 RX ORDER — CIPROFLOXACIN 250 MG/1
250 TABLET, FILM COATED ORAL 2 TIMES DAILY
Qty: 10 TABLET | Refills: 0 | Status: SHIPPED | OUTPATIENT
Start: 2024-11-20 | End: 2024-11-25

## 2024-11-20 ASSESSMENT — PATIENT HEALTH QUESTIONNAIRE - PHQ9
SUM OF ALL RESPONSES TO PHQ QUESTIONS 1-9: 1
1. LITTLE INTEREST OR PLEASURE IN DOING THINGS: NOT AT ALL
SUM OF ALL RESPONSES TO PHQ9 QUESTIONS 1 & 2: 1
SUM OF ALL RESPONSES TO PHQ QUESTIONS 1-9: 1
2. FEELING DOWN, DEPRESSED OR HOPELESS: SEVERAL DAYS

## 2024-11-20 NOTE — PROGRESS NOTES
Patient is here for a urine test   
Effects and Warnings were discussed with patient: yes    Patient verbalized understanding of above instructions.    Lea Grissom MD  Internal Medicine  [facility]

## 2024-11-23 LAB — RESULT: ABNORMAL

## 2024-12-24 ENCOUNTER — OFFICE VISIT (OUTPATIENT)
Facility: CLINIC | Age: 69
End: 2024-12-24
Payer: MEDICARE

## 2024-12-24 VITALS
TEMPERATURE: 97.9 F | HEART RATE: 63 BPM | SYSTOLIC BLOOD PRESSURE: 147 MMHG | HEIGHT: 59 IN | DIASTOLIC BLOOD PRESSURE: 87 MMHG | WEIGHT: 177 LBS | BODY MASS INDEX: 35.68 KG/M2 | RESPIRATION RATE: 16 BRPM

## 2024-12-24 DIAGNOSIS — E78.5 HYPERLIPIDEMIA, UNSPECIFIED HYPERLIPIDEMIA TYPE: ICD-10-CM

## 2024-12-24 DIAGNOSIS — I10 ESSENTIAL HYPERTENSION: Primary | ICD-10-CM

## 2024-12-24 DIAGNOSIS — M17.12 PRIMARY OSTEOARTHRITIS OF LEFT KNEE: ICD-10-CM

## 2024-12-24 DIAGNOSIS — R73.01 IMPAIRED FASTING GLUCOSE: ICD-10-CM

## 2024-12-24 PROCEDURE — 3078F DIAST BP <80 MM HG: CPT | Performed by: INTERNAL MEDICINE

## 2024-12-24 PROCEDURE — 1160F RVW MEDS BY RX/DR IN RCRD: CPT | Performed by: INTERNAL MEDICINE

## 2024-12-24 PROCEDURE — 3077F SYST BP >= 140 MM HG: CPT | Performed by: INTERNAL MEDICINE

## 2024-12-24 PROCEDURE — 99214 OFFICE O/P EST MOD 30 MIN: CPT | Performed by: INTERNAL MEDICINE

## 2024-12-24 PROCEDURE — 1159F MED LIST DOCD IN RCRD: CPT | Performed by: INTERNAL MEDICINE

## 2024-12-24 PROCEDURE — 1123F ACP DISCUSS/DSCN MKR DOCD: CPT | Performed by: INTERNAL MEDICINE

## 2024-12-24 PROCEDURE — 1125F AMNT PAIN NOTED PAIN PRSNT: CPT | Performed by: INTERNAL MEDICINE

## 2024-12-24 RX ORDER — LOSARTAN POTASSIUM 50 MG/1
50 TABLET ORAL 2 TIMES DAILY
Qty: 30 TABLET | Refills: 0 | Status: SHIPPED | OUTPATIENT
Start: 2024-12-24

## 2024-12-24 ASSESSMENT — PATIENT HEALTH QUESTIONNAIRE - PHQ9
SUM OF ALL RESPONSES TO PHQ QUESTIONS 1-9: 0
SUM OF ALL RESPONSES TO PHQ9 QUESTIONS 1 & 2: 0
1. LITTLE INTEREST OR PLEASURE IN DOING THINGS: NOT AT ALL
2. FEELING DOWN, DEPRESSED OR HOPELESS: NOT AT ALL
SUM OF ALL RESPONSES TO PHQ QUESTIONS 1-9: 0

## 2024-12-24 NOTE — PROGRESS NOTES
Assessment/ Plan:   Erin was seen today for follow-up.    Diagnoses and all orders for this visit:    Essential hypertension-uncontrolled, Losartan dose inc to BID, advised on home monitoring and low sodium diet  -     losartan (COZAAR) 50 MG tablet; Take 1 tablet by mouth in the morning and at bedtime    Primary osteoarthritis of left knee-seeing Ortho    Hyperlipidemia, unspecified hyperlipidemia type-goal LDL of less than 100; unable to tolerate statins; recheck next visit as ASCVD risk score is high; discuss Zetia if still elev at next lab  Lab Results   Component Value Date     (H) 08/28/2024      The 10-year ASCVD risk score (Nova DELVALLE, et al., 2019) is: 16.5%    Values used to calculate the score:      Age: 69 years      Sex: Female      Is Non- : Yes      Diabetic: No      Tobacco smoker: No      Systolic Blood Pressure: 147 mmHg      Is BP treated: Yes      HDL Cholesterol: 67 mg/dL      Total Cholesterol: 213 mg/dL   Impaired fasting glucose-continue with efforts at weight loss        Follow-up and Dispositions    Return in about 3 months (around 3/24/2025) for follow up.                 Chief Complaint   Patient presents with    Follow-up     Blood pressure elevated, B/L knee pain pain scale 5/6 Take Tylenol 2 tablets daily        Pt is a 69 y.o. year old female who presents for follow up of her chronic medical problems    Health Maintenance Due   Topic Date Due    Shingles vaccine (1 of 2) Never done    Respiratory Syncytial Virus (RSV) Pregnant or age 60 yrs+ (1 - Risk 60-74 years 1-dose series) Never done    Pneumococcal 65+ years Vaccine (2 of 2 - PPSV23 or PCV20) 01/15/2021    Flu vaccine (1)-today 08/01/2024    COVID-19 Vaccine (4 - 2023-24 season) 09/01/2024    DTaP/Tdap/Td vaccine (2 - Td or Tdap) 12/24/2024        Wt Readings from Last 3 Encounters:   12/24/24 80.3 kg (177 lb)   11/20/24 77.6 kg (171 lb)   08/26/24 82.7 kg (182 lb 6.4 oz)    169 at home, wearing

## 2024-12-25 PROBLEM — Z86.73 HISTORY OF TIA (TRANSIENT ISCHEMIC ATTACK): Status: ACTIVE | Noted: 2022-12-21

## 2025-04-30 ENCOUNTER — HOSPITAL ENCOUNTER (OUTPATIENT)
Facility: HOSPITAL | Age: 70
Setting detail: SPECIMEN
Discharge: HOME | End: 2025-05-03

## 2025-04-30 ENCOUNTER — OFFICE VISIT (OUTPATIENT)
Facility: CLINIC | Age: 70
End: 2025-04-30
Payer: MEDICARE

## 2025-04-30 VITALS
BODY MASS INDEX: 34.88 KG/M2 | TEMPERATURE: 97.5 F | DIASTOLIC BLOOD PRESSURE: 83 MMHG | OXYGEN SATURATION: 99 % | SYSTOLIC BLOOD PRESSURE: 138 MMHG | WEIGHT: 173 LBS | RESPIRATION RATE: 16 BRPM | HEART RATE: 70 BPM | HEIGHT: 59 IN

## 2025-04-30 DIAGNOSIS — I10 ESSENTIAL HYPERTENSION: ICD-10-CM

## 2025-04-30 DIAGNOSIS — E78.5 HYPERLIPIDEMIA, UNSPECIFIED HYPERLIPIDEMIA TYPE: ICD-10-CM

## 2025-04-30 DIAGNOSIS — E66.01 SEVERE OBESITY (HCC): ICD-10-CM

## 2025-04-30 DIAGNOSIS — R73.01 IMPAIRED FASTING GLUCOSE: ICD-10-CM

## 2025-04-30 DIAGNOSIS — Z00.00 MEDICARE ANNUAL WELLNESS VISIT, SUBSEQUENT: Primary | ICD-10-CM

## 2025-04-30 PROCEDURE — 1159F MED LIST DOCD IN RCRD: CPT | Performed by: INTERNAL MEDICINE

## 2025-04-30 PROCEDURE — 3079F DIAST BP 80-89 MM HG: CPT | Performed by: INTERNAL MEDICINE

## 2025-04-30 PROCEDURE — G0439 PPPS, SUBSEQ VISIT: HCPCS | Performed by: INTERNAL MEDICINE

## 2025-04-30 PROCEDURE — 99214 OFFICE O/P EST MOD 30 MIN: CPT | Performed by: INTERNAL MEDICINE

## 2025-04-30 PROCEDURE — 1160F RVW MEDS BY RX/DR IN RCRD: CPT | Performed by: INTERNAL MEDICINE

## 2025-04-30 PROCEDURE — 3075F SYST BP GE 130 - 139MM HG: CPT | Performed by: INTERNAL MEDICINE

## 2025-04-30 PROCEDURE — 1123F ACP DISCUSS/DSCN MKR DOCD: CPT | Performed by: INTERNAL MEDICINE

## 2025-04-30 PROCEDURE — 99001 SPECIMEN HANDLING PT-LAB: CPT

## 2025-04-30 SDOH — ECONOMIC STABILITY: INCOME INSECURITY: IN THE LAST 12 MONTHS, WAS THERE A TIME WHEN YOU WERE NOT ABLE TO PAY THE MORTGAGE OR RENT ON TIME?: YES

## 2025-04-30 SDOH — ECONOMIC STABILITY: FOOD INSECURITY: WITHIN THE PAST 12 MONTHS, THE FOOD YOU BOUGHT JUST DIDN'T LAST AND YOU DIDN'T HAVE MONEY TO GET MORE.: NEVER TRUE

## 2025-04-30 SDOH — ECONOMIC STABILITY: FOOD INSECURITY: WITHIN THE PAST 12 MONTHS, YOU WORRIED THAT YOUR FOOD WOULD RUN OUT BEFORE YOU GOT MONEY TO BUY MORE.: NEVER TRUE

## 2025-04-30 SDOH — HEALTH STABILITY: PHYSICAL HEALTH: ON AVERAGE, HOW MANY MINUTES DO YOU ENGAGE IN EXERCISE AT THIS LEVEL?: 90 MIN

## 2025-04-30 SDOH — ECONOMIC STABILITY: TRANSPORTATION INSECURITY
IN THE PAST 12 MONTHS, HAS LACK OF TRANSPORTATION KEPT YOU FROM MEETINGS, WORK, OR FROM GETTING THINGS NEEDED FOR DAILY LIVING?: YES

## 2025-04-30 SDOH — ECONOMIC STABILITY: TRANSPORTATION INSECURITY
IN THE PAST 12 MONTHS, HAS THE LACK OF TRANSPORTATION KEPT YOU FROM MEDICAL APPOINTMENTS OR FROM GETTING MEDICATIONS?: YES

## 2025-04-30 ASSESSMENT — LIFESTYLE VARIABLES
HOW OFTEN DO YOU HAVE A DRINK CONTAINING ALCOHOL: NEVER
HOW MANY STANDARD DRINKS CONTAINING ALCOHOL DO YOU HAVE ON A TYPICAL DAY: PATIENT DOES NOT DRINK
HOW OFTEN DO YOU HAVE A DRINK CONTAINING ALCOHOL: 1
HOW MANY STANDARD DRINKS CONTAINING ALCOHOL DO YOU HAVE ON A TYPICAL DAY: 0
HOW OFTEN DO YOU HAVE SIX OR MORE DRINKS ON ONE OCCASION: 1

## 2025-04-30 ASSESSMENT — PATIENT HEALTH QUESTIONNAIRE - PHQ9
SUM OF ALL RESPONSES TO PHQ QUESTIONS 1-9: 0
2. FEELING DOWN, DEPRESSED OR HOPELESS: NOT AT ALL
SUM OF ALL RESPONSES TO PHQ QUESTIONS 1-9: 0
1. LITTLE INTEREST OR PLEASURE IN DOING THINGS: NOT AT ALL

## 2025-04-30 NOTE — PROGRESS NOTES
Have you been to the ER, urgent care clinic since your last visit?  Hospitalized since your last visit?   NO    Have you seen or consulted any other health care providers outside our system since your last visit?   Yes- follow up with Cardiology Dr. Balbuena January 2025             
differently: Take 1 tablet by mouth daily Yes Naila Grissom MD   Ferrous Fumarate 325 (106 Fe) MG TABS Take by mouth Yes Provider, MD José Miguel   aspirin 81 MG chewable tablet Take 1 tablet by mouth daily Yes Automatic Reconciliation, Ar   Calcium Carb-Cholecalciferol 600-20 MG-MCG CHEW Take 1 tablet by mouth daily Yes Automatic Reconciliation, Ar       CareTeam (Including outside providers/suppliers regularly involved in providing care):   Patient Care Team:  Naila Grissom MD as PCP - General  Naila Grissom MD as PCP - Empaneled Provider  Renea Nix RN as Ambulatory Care Manager  Nelson Balbuena MD as Consulting Physician (Cardiovascular Disease)     Recommendations for Preventive Services Due: see orders and patient instructions/AVS.  Recommended screening schedule for the next 5-10 years is provided to the patient in written form: see Patient Instructions/AVS.     Reviewed and updated this visit:  Tobacco  Allergies  Meds  Problems  Med Hx  Surg Hx  Fam Hx

## 2025-05-01 LAB
A/G RATIO: 1.4 RATIO (ref 1.1–2.6)
ALBUMIN: 4.4 G/DL (ref 3.5–5)
ALP BLD-CCNC: 109 U/L (ref 40–120)
ALT SERPL-CCNC: 16 U/L (ref 5–40)
ANION GAP SERPL CALCULATED.3IONS-SCNC: 11 MMOL/L (ref 3–15)
AST SERPL-CCNC: 20 U/L (ref 10–37)
BILIRUB SERPL-MCNC: 0.4 MG/DL (ref 0.2–1.2)
BUN BLDV-MCNC: 10 MG/DL (ref 6–22)
CALCIUM SERPL-MCNC: 9.5 MG/DL (ref 8.4–10.5)
CHLORIDE BLD-SCNC: 102 MMOL/L (ref 98–110)
CHOLESTEROL, TOTAL: 235 MG/DL (ref 110–200)
CHOLESTEROL/HDL RATIO: 3.2 (ref 0–5)
CO2: 27 MMOL/L (ref 20–32)
CREAT SERPL-MCNC: 0.8 MG/DL (ref 0.8–1.4)
ESTIMATED AVERAGE GLUCOSE: 128 MG/DL (ref 91–123)
GFR, ESTIMATED: >60 ML/MIN/1.73 SQ.M.
GLOBULIN: 3.2 G/DL (ref 2–4)
GLUCOSE: 86 MG/DL (ref 70–99)
HBA1C MFR BLD: 6.1 % (ref 4.8–5.6)
HCT VFR BLD CALC: 46 % (ref 35.1–48.3)
HDLC SERPL-MCNC: 73 MG/DL
HEMOGLOBIN: 14 G/DL (ref 11.7–16.1)
LDL CHOLESTEROL: 147 MG/DL (ref 50–99)
LDL/HDL RATIO: 2
MCH RBC QN AUTO: 25 PG (ref 26–34)
MCHC RBC AUTO-ENTMCNC: 30 G/DL (ref 31–36)
MCV RBC AUTO: 82 FL (ref 80–99)
NON-HDL CHOLESTEROL: 162 MG/DL
PDW BLD-RTO: 17.4 % (ref 10–15.5)
PLATELET # BLD: 191 K/UL (ref 140–440)
PMV BLD AUTO: 12.3 FL (ref 9–13)
POTASSIUM SERPL-SCNC: 4.4 MMOL/L (ref 3.5–5.5)
RBC # BLD: 5.61 M/UL (ref 3.8–5.2)
SODIUM BLD-SCNC: 140 MMOL/L (ref 133–145)
TOTAL PROTEIN: 7.6 G/DL (ref 6.2–8.1)
TRIGL SERPL-MCNC: 71 MG/DL (ref 40–149)
VLDLC SERPL CALC-MCNC: 14 MG/DL (ref 8–30)
WBC # BLD: 3.4 K/UL (ref 4–11)

## 2025-05-03 LAB — SENTARA SPECIMEN COLLECTION: NORMAL

## 2025-05-05 DIAGNOSIS — E66.01 SEVERE OBESITY (HCC): ICD-10-CM

## 2025-05-05 DIAGNOSIS — G47.33 OSA (OBSTRUCTIVE SLEEP APNEA): Primary | ICD-10-CM

## 2025-05-05 RX ORDER — TIRZEPATIDE 2.5 MG/.5ML
2.5 INJECTION, SOLUTION SUBCUTANEOUS WEEKLY
Qty: 2 ML | Refills: 0 | Status: SHIPPED | OUTPATIENT
Start: 2025-05-05

## 2025-07-06 ENCOUNTER — RESULTS FOLLOW-UP (OUTPATIENT)
Facility: CLINIC | Age: 70
End: 2025-07-06

## 2025-08-26 ENCOUNTER — OFFICE VISIT (OUTPATIENT)
Facility: CLINIC | Age: 70
End: 2025-08-26
Payer: MEDICARE

## 2025-08-26 VITALS
DIASTOLIC BLOOD PRESSURE: 78 MMHG | HEIGHT: 59 IN | HEART RATE: 66 BPM | SYSTOLIC BLOOD PRESSURE: 118 MMHG | BODY MASS INDEX: 34.43 KG/M2 | OXYGEN SATURATION: 98 % | TEMPERATURE: 98.3 F | RESPIRATION RATE: 18 BRPM | WEIGHT: 170.8 LBS

## 2025-08-26 DIAGNOSIS — Z78.0 MENOPAUSE: ICD-10-CM

## 2025-08-26 DIAGNOSIS — G47.33 OSA ON CPAP: ICD-10-CM

## 2025-08-26 DIAGNOSIS — Z13.820 SCREENING FOR OSTEOPOROSIS: ICD-10-CM

## 2025-08-26 DIAGNOSIS — E55.9 VITAMIN D DEFICIENCY: ICD-10-CM

## 2025-08-26 DIAGNOSIS — H25.9 AGE-RELATED CATARACT OF BOTH EYES, UNSPECIFIED AGE-RELATED CATARACT TYPE: ICD-10-CM

## 2025-08-26 DIAGNOSIS — I10 ESSENTIAL HYPERTENSION: ICD-10-CM

## 2025-08-26 DIAGNOSIS — M85.80 OSTEOPENIA, UNSPECIFIED LOCATION: ICD-10-CM

## 2025-08-26 DIAGNOSIS — E66.01 SEVERE OBESITY (BMI 35.0-35.9 WITH COMORBIDITY) (HCC): ICD-10-CM

## 2025-08-26 DIAGNOSIS — E78.5 HYPERLIPIDEMIA, UNSPECIFIED HYPERLIPIDEMIA TYPE: ICD-10-CM

## 2025-08-26 DIAGNOSIS — G25.81 RLS (RESTLESS LEGS SYNDROME): ICD-10-CM

## 2025-08-26 DIAGNOSIS — R73.01 IMPAIRED FASTING GLUCOSE: ICD-10-CM

## 2025-08-26 DIAGNOSIS — I10 ESSENTIAL HYPERTENSION: Primary | ICD-10-CM

## 2025-08-26 LAB — HBA1C MFR BLD: 5.8 %

## 2025-08-26 PROCEDURE — 3074F SYST BP LT 130 MM HG: CPT | Performed by: INTERNAL MEDICINE

## 2025-08-26 PROCEDURE — 83036 HEMOGLOBIN GLYCOSYLATED A1C: CPT | Performed by: INTERNAL MEDICINE

## 2025-08-26 PROCEDURE — 1123F ACP DISCUSS/DSCN MKR DOCD: CPT | Performed by: INTERNAL MEDICINE

## 2025-08-26 PROCEDURE — 1160F RVW MEDS BY RX/DR IN RCRD: CPT | Performed by: INTERNAL MEDICINE

## 2025-08-26 PROCEDURE — 1159F MED LIST DOCD IN RCRD: CPT | Performed by: INTERNAL MEDICINE

## 2025-08-26 PROCEDURE — 1126F AMNT PAIN NOTED NONE PRSNT: CPT | Performed by: INTERNAL MEDICINE

## 2025-08-26 PROCEDURE — 99214 OFFICE O/P EST MOD 30 MIN: CPT | Performed by: INTERNAL MEDICINE

## 2025-08-26 PROCEDURE — 3078F DIAST BP <80 MM HG: CPT | Performed by: INTERNAL MEDICINE

## 2025-08-26 RX ORDER — LOSARTAN POTASSIUM 50 MG/1
50 TABLET ORAL DAILY
Qty: 90 TABLET | Refills: 1 | Status: SHIPPED | OUTPATIENT
Start: 2025-08-26

## 2025-08-26 ASSESSMENT — PATIENT HEALTH QUESTIONNAIRE - PHQ9
SUM OF ALL RESPONSES TO PHQ QUESTIONS 1-9: 0
SUM OF ALL RESPONSES TO PHQ QUESTIONS 1-9: 0
1. LITTLE INTEREST OR PLEASURE IN DOING THINGS: NOT AT ALL
SUM OF ALL RESPONSES TO PHQ QUESTIONS 1-9: 0
2. FEELING DOWN, DEPRESSED OR HOPELESS: NOT AT ALL
SUM OF ALL RESPONSES TO PHQ QUESTIONS 1-9: 0

## 2025-08-29 LAB
A/G RATIO: 1.5 RATIO (ref 1.1–2.6)
ALBUMIN: 4.1 G/DL (ref 3.5–5)
ALP BLD-CCNC: 76 U/L (ref 40–120)
ALT SERPL-CCNC: 13 U/L (ref 5–40)
ANION GAP SERPL CALCULATED.3IONS-SCNC: 14 MMOL/L (ref 3–15)
AST SERPL-CCNC: 16 U/L (ref 10–37)
BILIRUB SERPL-MCNC: 0.3 MG/DL (ref 0.2–1.2)
BUN BLDV-MCNC: 13 MG/DL (ref 6–22)
CALCIUM SERPL-MCNC: 8.9 MG/DL (ref 8.4–10.5)
CHLORIDE BLD-SCNC: 104 MMOL/L (ref 98–110)
CHOLESTEROL, TOTAL: 216 MG/DL (ref 110–200)
CHOLESTEROL/HDL RATIO: 3 (ref 0–5)
CO2: 22 MMOL/L (ref 20–32)
CREAT SERPL-MCNC: 0.9 MG/DL (ref 0.8–1.4)
GFR, ESTIMATED: 64.7 ML/MIN/1.73 SQ.M.
GLOBULIN: 2.7 G/DL (ref 2–4)
GLUCOSE: 86 MG/DL (ref 70–99)
HCT VFR BLD CALC: 43.3 % (ref 35.1–48.3)
HDLC SERPL-MCNC: 71 MG/DL
HEMOGLOBIN: 13.4 G/DL (ref 11.7–16.1)
LDL CHOLESTEROL: 132 MG/DL (ref 50–99)
LDL/HDL RATIO: 1.9
MCH RBC QN AUTO: 25 PG (ref 26–34)
MCHC RBC AUTO-ENTMCNC: 31 G/DL (ref 31–36)
MCV RBC AUTO: 80 FL (ref 80–99)
NON-HDL CHOLESTEROL: 145 MG/DL
PDW BLD-RTO: 18 % (ref 10–15.5)
PLATELET # BLD: 173 K/UL (ref 140–440)
PMV BLD AUTO: 11.7 FL (ref 9–13)
POTASSIUM SERPL-SCNC: 3.9 MMOL/L (ref 3.5–5.5)
RBC # BLD: 5.41 M/UL (ref 3.8–5.2)
SODIUM BLD-SCNC: 140 MMOL/L (ref 133–145)
TOTAL PROTEIN: 6.8 G/DL (ref 6.2–8.1)
TRIGL SERPL-MCNC: 61 MG/DL (ref 40–149)
VITAMIN D 25-HYDROXY: 96.4 NG/ML (ref 32–100)
VLDLC SERPL CALC-MCNC: 12 MG/DL (ref 8–30)
WBC # BLD: 3.9 K/UL (ref 4–11)